# Patient Record
Sex: FEMALE | Race: WHITE | NOT HISPANIC OR LATINO | Employment: PART TIME | URBAN - METROPOLITAN AREA
[De-identification: names, ages, dates, MRNs, and addresses within clinical notes are randomized per-mention and may not be internally consistent; named-entity substitution may affect disease eponyms.]

---

## 2017-04-17 ENCOUNTER — HISTORICAL (OUTPATIENT)
Dept: ADMINISTRATIVE | Facility: HOSPITAL | Age: 67
End: 2017-04-17

## 2019-08-13 ENCOUNTER — HISTORICAL (OUTPATIENT)
Dept: ADMINISTRATIVE | Facility: HOSPITAL | Age: 69
End: 2019-08-13

## 2019-08-13 LAB
ALBUMIN SERPL-MCNC: 4.3 G/DL (ref 3.6–4.8)
ALBUMIN/GLOB SERPL: 1.5 {RATIO} (ref 1.2–2.2)
ALP SERPL-CCNC: 65 IU/L (ref 39–117)
ALT SERPL-CCNC: 31 IU/L (ref 0–32)
AST SERPL-CCNC: 25 IU/L (ref 0–40)
BASOPHILS # BLD AUTO: 0.1 X10E3/UL (ref 0–0.2)
BASOPHILS NFR BLD AUTO: 1 %
BILIRUB SERPL-MCNC: 0.4 MG/DL (ref 0–1.2)
BUN SERPL-MCNC: 14 MG/DL (ref 8–27)
CALCIUM SERPL-MCNC: 9.8 MG/DL (ref 8.7–10.3)
CHLORIDE SERPL-SCNC: 100 MMOL/L (ref 96–106)
CO2 SERPL-SCNC: 22 MMOL/L (ref 20–29)
CREAT SERPL-MCNC: 0.64 MG/DL (ref 0.57–1)
CREAT/UREA NIT SERPL: 22 (ref 12–28)
EOSINOPHIL # BLD AUTO: 0.2 X10E3/UL (ref 0–0.4)
EOSINOPHIL NFR BLD AUTO: 2 %
ERYTHROCYTE [DISTWIDTH] IN BLOOD BY AUTOMATED COUNT: 12.8 % (ref 12.3–15.4)
GLOBULIN SER-MCNC: 2.8 G/DL (ref 1.5–4.5)
GLUCOSE SERPL-MCNC: 100 MG/DL (ref 65–99)
HCT VFR BLD AUTO: 36.5 % (ref 34–46.6)
HGB BLD-MCNC: 11.5 G/DL (ref 11.1–15.9)
INFLUENZA A ANTIGEN, POC: NEGATIVE
INFLUENZA B ANTIGEN, POC: NEGATIVE
LYMPHOCYTES # BLD AUTO: 1.6 X10E3/UL (ref 0.7–3.1)
LYMPHOCYTES NFR BLD AUTO: 18 %
MCH RBC QN AUTO: 30.2 PG (ref 26.6–33)
MCHC RBC AUTO-ENTMCNC: 31.5 G/DL (ref 31.5–35.7)
MCV RBC AUTO: 96 FL (ref 79–97)
MONOCYTES # BLD AUTO: 0.8 X10E3/UL (ref 0.1–0.9)
MONOCYTES NFR BLD AUTO: 9 %
NEUTROPHILS # BLD AUTO: 6.2 X10E3/UL (ref 1.4–7)
NEUTROPHILS NFR BLD AUTO: 70 %
PLATELET # BLD AUTO: 344 X10E3/UL (ref 150–450)
POTASSIUM SERPL-SCNC: 4.3 MMOL/L (ref 3.5–5.2)
PROT SERPL-MCNC: 7.1 G/DL (ref 6–8.5)
RBC # BLD AUTO: 3.81 X10(6)/MCL (ref 3.77–5.28)
SODIUM SERPL-SCNC: 143 MMOL/L (ref 134–144)
WBC # SPEC AUTO: 8.8 X10E3/UL (ref 3.4–10.8)

## 2022-04-10 ENCOUNTER — HISTORICAL (OUTPATIENT)
Dept: ADMINISTRATIVE | Facility: HOSPITAL | Age: 72
End: 2022-04-10

## 2022-04-28 VITALS
WEIGHT: 144.06 LBS | BODY MASS INDEX: 24 KG/M2 | DIASTOLIC BLOOD PRESSURE: 84 MMHG | SYSTOLIC BLOOD PRESSURE: 126 MMHG | HEIGHT: 65 IN

## 2023-11-25 ENCOUNTER — HOSPITAL ENCOUNTER (INPATIENT)
Facility: HOSPITAL | Age: 73
LOS: 6 days | Discharge: HOME | DRG: 522 | End: 2023-12-01
Attending: STUDENT IN AN ORGANIZED HEALTH CARE EDUCATION/TRAINING PROGRAM | Admitting: INTERNAL MEDICINE
Payer: MEDICARE

## 2023-11-25 DIAGNOSIS — S72.009A FEMORAL NECK FRACTURE: ICD-10-CM

## 2023-11-25 DIAGNOSIS — S72.012A CLOSED SUBCAPITAL FRACTURE OF LEFT FEMUR, INITIAL ENCOUNTER: ICD-10-CM

## 2023-11-25 DIAGNOSIS — R07.9 CHEST PAIN: ICD-10-CM
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
## 2023-11-25 DIAGNOSIS — Z01.818 PRE-OP EVALUATION: ICD-10-CM

## 2023-11-25 DIAGNOSIS — I48.91 ATRIAL FIBRILLATION: ICD-10-CM

## 2023-11-25 DIAGNOSIS — S72.002A CLOSED DISPLACED FRACTURE OF NECK OF LEFT FEMUR: Primary | ICD-10-CM

## 2023-11-25 LAB
ABORH RETYPE: NORMAL
ALBUMIN SERPL-MCNC: 4.1 G/DL (ref 3.4–4.8)
ALBUMIN/GLOB SERPL: 1.8 RATIO (ref 1.1–2)
ALP SERPL-CCNC: 46 UNIT/L (ref 40–150)
ALT SERPL-CCNC: 15 UNIT/L (ref 0–55)
AST SERPL-CCNC: 19 UNIT/L (ref 5–34)
BASOPHILS # BLD AUTO: 0.04 X10(3)/MCL
BASOPHILS NFR BLD AUTO: 0.5 %
BILIRUB SERPL-MCNC: 0.9 MG/DL
BUN SERPL-MCNC: 15.9 MG/DL (ref 9.8–20.1)
CALCIUM SERPL-MCNC: 9.2 MG/DL (ref 8.4–10.2)
CHLORIDE SERPL-SCNC: 107 MMOL/L (ref 98–107)
CO2 SERPL-SCNC: 24 MMOL/L (ref 23–31)
CREAT SERPL-MCNC: 0.7 MG/DL (ref 0.55–1.02)
EOSINOPHIL # BLD AUTO: 0.21 X10(3)/MCL (ref 0–0.9)
EOSINOPHIL NFR BLD AUTO: 2.5 %
ERYTHROCYTE [DISTWIDTH] IN BLOOD BY AUTOMATED COUNT: 12.7 % (ref 11.5–17)
GFR SERPLBLD CREATININE-BSD FMLA CKD-EPI: >60 MLS/MIN/1.73/M2
GLOBULIN SER-MCNC: 2.3 GM/DL (ref 2.4–3.5)
GLUCOSE SERPL-MCNC: 99 MG/DL (ref 82–115)
GROUP & RH: NORMAL
HCT VFR BLD AUTO: 35 % (ref 37–47)
HGB BLD-MCNC: 12 G/DL (ref 12–16)
IMM GRANULOCYTES # BLD AUTO: 0.05 X10(3)/MCL (ref 0–0.04)
IMM GRANULOCYTES NFR BLD AUTO: 0.6 %
INDIRECT COOMBS GEL: NORMAL
LYMPHOCYTES # BLD AUTO: 1.35 X10(3)/MCL (ref 0.6–4.6)
LYMPHOCYTES NFR BLD AUTO: 16.2 %
MCH RBC QN AUTO: 30.4 PG (ref 27–31)
MCHC RBC AUTO-ENTMCNC: 34.3 G/DL (ref 33–36)
MCV RBC AUTO: 88.6 FL (ref 80–94)
MONOCYTES # BLD AUTO: 0.47 X10(3)/MCL (ref 0.1–1.3)
MONOCYTES NFR BLD AUTO: 5.6 %
NEUTROPHILS # BLD AUTO: 6.23 X10(3)/MCL (ref 2.1–9.2)
NEUTROPHILS NFR BLD AUTO: 74.6 %
NRBC BLD AUTO-RTO: 0 %
PLATELET # BLD AUTO: 217 X10(3)/MCL (ref 130–400)
PMV BLD AUTO: 10.2 FL (ref 7.4–10.4)
POTASSIUM SERPL-SCNC: 4.1 MMOL/L (ref 3.5–5.1)
PROT SERPL-MCNC: 6.4 GM/DL (ref 5.8–7.6)
RBC # BLD AUTO: 3.95 X10(6)/MCL (ref 4.2–5.4)
SODIUM SERPL-SCNC: 142 MMOL/L (ref 136–145)
SPECIMEN OUTDATE: NORMAL
WBC # SPEC AUTO: 8.35 X10(3)/MCL (ref 4.5–11.5)

## 2023-11-25 PROCEDURE — 85025 COMPLETE CBC W/AUTO DIFF WBC: CPT | Performed by: STUDENT IN AN ORGANIZED HEALTH CARE EDUCATION/TRAINING PROGRAM

## 2023-11-25 PROCEDURE — 63600175 PHARM REV CODE 636 W HCPCS: Performed by: NURSE PRACTITIONER

## 2023-11-25 PROCEDURE — 96375 TX/PRO/DX INJ NEW DRUG ADDON: CPT

## 2023-11-25 PROCEDURE — 96374 THER/PROPH/DIAG INJ IV PUSH: CPT

## 2023-11-25 PROCEDURE — 11000001 HC ACUTE MED/SURG PRIVATE ROOM

## 2023-11-25 PROCEDURE — 96376 TX/PRO/DX INJ SAME DRUG ADON: CPT

## 2023-11-25 PROCEDURE — 80053 COMPREHEN METABOLIC PANEL: CPT | Performed by: STUDENT IN AN ORGANIZED HEALTH CARE EDUCATION/TRAINING PROGRAM

## 2023-11-25 PROCEDURE — 63600175 PHARM REV CODE 636 W HCPCS: Performed by: STUDENT IN AN ORGANIZED HEALTH CARE EDUCATION/TRAINING PROGRAM

## 2023-11-25 PROCEDURE — 99285 EMERGENCY DEPT VISIT HI MDM: CPT | Mod: 25

## 2023-11-25 PROCEDURE — 63600175 PHARM REV CODE 636 W HCPCS: Performed by: INTERNAL MEDICINE

## 2023-11-25 PROCEDURE — 86850 RBC ANTIBODY SCREEN: CPT | Performed by: INTERNAL MEDICINE

## 2023-11-25 PROCEDURE — 96361 HYDRATE IV INFUSION ADD-ON: CPT

## 2023-11-25 PROCEDURE — 25000003 PHARM REV CODE 250: Performed by: INTERNAL MEDICINE

## 2023-11-25 RX ORDER — NALOXONE HCL 0.4 MG/ML
0.02 VIAL (ML) INJECTION
Status: DISCONTINUED | OUTPATIENT
Start: 2023-11-25 | End: 2023-11-30

## 2023-11-25 RX ORDER — HYDROMORPHONE HYDROCHLORIDE 2 MG/ML
1 INJECTION, SOLUTION INTRAMUSCULAR; INTRAVENOUS; SUBCUTANEOUS
Status: COMPLETED | OUTPATIENT
Start: 2023-11-25 | End: 2023-11-25

## 2023-11-25 RX ORDER — METHOCARBAMOL 100 MG/ML
1000 INJECTION, SOLUTION INTRAMUSCULAR; INTRAVENOUS ONCE
Status: COMPLETED | OUTPATIENT
Start: 2023-11-25 | End: 2023-11-25

## 2023-11-25 RX ORDER — ONDANSETRON 2 MG/ML
4 INJECTION INTRAMUSCULAR; INTRAVENOUS EVERY 4 HOURS PRN
Status: DISCONTINUED | OUTPATIENT
Start: 2023-11-25 | End: 2023-11-30

## 2023-11-25 RX ORDER — OXYCODONE AND ACETAMINOPHEN 5; 325 MG/1; MG/1
1 TABLET ORAL EVERY 6 HOURS PRN
Status: DISCONTINUED | OUTPATIENT
Start: 2023-11-25 | End: 2023-11-25

## 2023-11-25 RX ORDER — SERTRALINE HYDROCHLORIDE 50 MG/1
50 TABLET, FILM COATED ORAL
COMMUNITY

## 2023-11-25 RX ORDER — MAG HYDROX/ALUMINUM HYD/SIMETH 200-200-20
30 SUSPENSION, ORAL (FINAL DOSE FORM) ORAL 4 TIMES DAILY PRN
Status: DISCONTINUED | OUTPATIENT
Start: 2023-11-25 | End: 2023-12-01 | Stop reason: HOSPADM

## 2023-11-25 RX ORDER — ONDANSETRON 2 MG/ML
4 INJECTION INTRAMUSCULAR; INTRAVENOUS
Status: COMPLETED | OUTPATIENT
Start: 2023-11-25 | End: 2023-11-25

## 2023-11-25 RX ORDER — SOTALOL HYDROCHLORIDE 120 MG/1
120 TABLET ORAL 2 TIMES DAILY
Status: DISCONTINUED | OUTPATIENT
Start: 2023-11-25 | End: 2023-11-29

## 2023-11-25 RX ORDER — MORPHINE SULFATE 4 MG/ML
4 INJECTION, SOLUTION INTRAMUSCULAR; INTRAVENOUS EVERY 4 HOURS PRN
Status: DISCONTINUED | OUTPATIENT
Start: 2023-11-25 | End: 2023-11-26

## 2023-11-25 RX ORDER — PANTOPRAZOLE SODIUM 40 MG/1
1 TABLET, DELAYED RELEASE ORAL EVERY MORNING
COMMUNITY
Start: 2023-11-15

## 2023-11-25 RX ORDER — POLYETHYLENE GLYCOL 3350 17 G/17G
17 POWDER, FOR SOLUTION ORAL 2 TIMES DAILY PRN
Status: DISCONTINUED | OUTPATIENT
Start: 2023-11-25 | End: 2023-11-30

## 2023-11-25 RX ORDER — ATORVASTATIN CALCIUM 10 MG/1
20 TABLET, FILM COATED ORAL NIGHTLY
Status: DISCONTINUED | OUTPATIENT
Start: 2023-11-25 | End: 2023-12-01 | Stop reason: HOSPADM

## 2023-11-25 RX ORDER — APIXABAN 5 MG/1
5 TABLET, FILM COATED ORAL 2 TIMES DAILY
COMMUNITY

## 2023-11-25 RX ORDER — SODIUM CHLORIDE 0.9 % (FLUSH) 0.9 %
10 SYRINGE (ML) INJECTION
Status: DISCONTINUED | OUTPATIENT
Start: 2023-11-25 | End: 2023-12-01 | Stop reason: HOSPADM

## 2023-11-25 RX ORDER — PANTOPRAZOLE SODIUM 40 MG/1
40 TABLET, DELAYED RELEASE ORAL EVERY MORNING
Status: DISCONTINUED | OUTPATIENT
Start: 2023-11-26 | End: 2023-12-01 | Stop reason: HOSPADM

## 2023-11-25 RX ORDER — ACETAMINOPHEN 325 MG/1
650 TABLET ORAL EVERY 6 HOURS PRN
Status: DISCONTINUED | OUTPATIENT
Start: 2023-11-25 | End: 2023-11-30

## 2023-11-25 RX ORDER — MELOXICAM 15 MG/1
15 TABLET ORAL
COMMUNITY

## 2023-11-25 RX ORDER — METHOCARBAMOL 100 MG/ML
500 INJECTION, SOLUTION INTRAMUSCULAR; INTRAVENOUS EVERY 12 HOURS PRN
Status: DISPENSED | OUTPATIENT
Start: 2023-11-25 | End: 2023-11-26

## 2023-11-25 RX ORDER — TALC
6 POWDER (GRAM) TOPICAL NIGHTLY PRN
Status: DISCONTINUED | OUTPATIENT
Start: 2023-11-25 | End: 2023-11-27

## 2023-11-25 RX ORDER — PROCHLORPERAZINE EDISYLATE 5 MG/ML
5 INJECTION INTRAMUSCULAR; INTRAVENOUS EVERY 6 HOURS PRN
Status: DISCONTINUED | OUTPATIENT
Start: 2023-11-25 | End: 2023-11-30

## 2023-11-25 RX ORDER — SOTALOL HYDROCHLORIDE 120 MG/1
120 TABLET ORAL 2 TIMES DAILY
Status: ON HOLD | COMMUNITY
End: 2023-12-01 | Stop reason: HOSPADM

## 2023-11-25 RX ORDER — LABETALOL HYDROCHLORIDE 5 MG/ML
10 INJECTION, SOLUTION INTRAVENOUS
Status: DISCONTINUED | OUTPATIENT
Start: 2023-11-26 | End: 2023-11-30

## 2023-11-25 RX ORDER — SODIUM CHLORIDE, SODIUM LACTATE, POTASSIUM CHLORIDE, CALCIUM CHLORIDE 600; 310; 30; 20 MG/100ML; MG/100ML; MG/100ML; MG/100ML
INJECTION, SOLUTION INTRAVENOUS CONTINUOUS
Status: DISCONTINUED | OUTPATIENT
Start: 2023-11-25 | End: 2023-11-28

## 2023-11-25 RX ORDER — SERTRALINE HYDROCHLORIDE 50 MG/1
50 TABLET, FILM COATED ORAL DAILY
Status: DISCONTINUED | OUTPATIENT
Start: 2023-11-26 | End: 2023-12-01 | Stop reason: HOSPADM

## 2023-11-25 RX ORDER — HYDRALAZINE HYDROCHLORIDE 20 MG/ML
10 INJECTION INTRAMUSCULAR; INTRAVENOUS EVERY 4 HOURS PRN
Status: DISCONTINUED | OUTPATIENT
Start: 2023-11-26 | End: 2023-11-30

## 2023-11-25 RX ORDER — ROSUVASTATIN CALCIUM 20 MG/1
20 TABLET, COATED ORAL NIGHTLY
COMMUNITY

## 2023-11-25 RX ORDER — OXYCODONE AND ACETAMINOPHEN 10; 325 MG/1; MG/1
1 TABLET ORAL EVERY 4 HOURS PRN
Status: DISCONTINUED | OUTPATIENT
Start: 2023-11-25 | End: 2023-11-30

## 2023-11-25 RX ORDER — SIMETHICONE 80 MG
1 TABLET,CHEWABLE ORAL 4 TIMES DAILY PRN
Status: DISCONTINUED | OUTPATIENT
Start: 2023-11-25 | End: 2023-12-01 | Stop reason: HOSPADM

## 2023-11-25 RX ORDER — MORPHINE SULFATE 4 MG/ML
4 INJECTION, SOLUTION INTRAMUSCULAR; INTRAVENOUS
Status: COMPLETED | OUTPATIENT
Start: 2023-11-25 | End: 2023-11-25

## 2023-11-25 RX ADMIN — MORPHINE SULFATE 4 MG: 4 INJECTION, SOLUTION INTRAMUSCULAR; INTRAVENOUS at 03:11

## 2023-11-25 RX ADMIN — MORPHINE SULFATE 4 MG: 4 INJECTION, SOLUTION INTRAMUSCULAR; INTRAVENOUS at 10:11

## 2023-11-25 RX ADMIN — SODIUM CHLORIDE, POTASSIUM CHLORIDE, SODIUM LACTATE AND CALCIUM CHLORIDE: 600; 310; 30; 20 INJECTION, SOLUTION INTRAVENOUS at 09:11

## 2023-11-25 RX ADMIN — HYDROMORPHONE HYDROCHLORIDE 1 MG: 2 INJECTION INTRAMUSCULAR; INTRAVENOUS; SUBCUTANEOUS at 05:11

## 2023-11-25 RX ADMIN — SOTALOL HYDROCHLORIDE 120 MG: 120 TABLET ORAL at 11:11

## 2023-11-25 RX ADMIN — ONDANSETRON 4 MG: 2 INJECTION INTRAMUSCULAR; INTRAVENOUS at 03:11

## 2023-11-25 RX ADMIN — OXYCODONE HYDROCHLORIDE AND ACETAMINOPHEN 1 TABLET: 5; 325 TABLET ORAL at 08:11

## 2023-11-25 RX ADMIN — METHOCARBAMOL 500 MG: 100 INJECTION INTRAMUSCULAR; INTRAVENOUS at 10:11

## 2023-11-25 RX ADMIN — ATORVASTATIN CALCIUM 20 MG: 10 TABLET, FILM COATED ORAL at 11:11

## 2023-11-25 RX ADMIN — SODIUM CHLORIDE, POTASSIUM CHLORIDE, SODIUM LACTATE AND CALCIUM CHLORIDE 1000 ML: 600; 310; 30; 20 INJECTION, SOLUTION INTRAVENOUS at 03:11

## 2023-11-25 RX ADMIN — METHOCARBAMOL 1000 MG: 100 INJECTION INTRAMUSCULAR; INTRAVENOUS at 05:11

## 2023-11-25 NOTE — ED PROVIDER NOTES
Encounter Date: 11/25/2023    SCRIBE #1 NOTE: I, Brian Gimenez, am scribing for, and in the presence of,  Last Triana MD. I have scribed the following portions of the note - Other sections scribed: HPI, ROS, PE, MDM.       History     Chief Complaint   Patient presents with    Fall     Pt. Reports a trip and fall onto L hip, denies striking head/LOC, GCS 15. PMS in tact. Pt. Given 100mcg Fentanyl in route.      Patient is a 72 y/o female with a history of atrial fibrillation on Eliquis and hyperlipidemia presents to [facility] ED via EMS after sustaining a ground level trip and fall earlier today. Patient states that she is painting cabinets at home and that she tripped over the Visqueen earlier today causing her to fall. Patient has complaint of left hip pain. Patient notes that she has not been able to ambulate since the fall. Patient denies hitting her head, neck pain, and a loss of consciousness. EMS reports that the patient received 100mcg of fentanyl in route to the ED.     The history is provided by the patient, the EMS personnel and medical records.     Review of patient's allergies indicates:   Allergen Reactions    Tomato     Weed pollen      History reviewed. No pertinent past medical history.  History reviewed. No pertinent surgical history.  History reviewed. No pertinent family history.     Review of Systems   Constitutional:  Negative for fever.   HENT:  Negative for sore throat.    Eyes:  Negative for visual disturbance.   Respiratory:  Negative for shortness of breath.    Cardiovascular:  Negative for chest pain.   Gastrointestinal:  Negative for abdominal pain.   Genitourinary:  Negative for dysuria.   Musculoskeletal:  Negative for joint swelling.        Left hip pain   Skin:  Negative for rash.   Neurological:  Negative for weakness.   Psychiatric/Behavioral:  Negative for confusion.        Physical Exam     Initial Vitals [11/25/23 1505]   BP Pulse Resp Temp SpO2   (!) 152/94 75 20 97.5 °F  (36.4 °C) 98 %      MAP       --         Physical Exam    Nursing note and vitals reviewed.  Constitutional: She appears well-developed and well-nourished. She is not diaphoretic. No distress.   HENT:   Head: Normocephalic and atraumatic.   No abrasions, contusions, lacerations to the scalp or face.  No superior inferior orbital ridge tenderness to palpation.  No zygomatic arch tenderness to palpation.  No epistaxis.  No CSF rhinorrhea.  No septal hematoma.  No intraoral injuries noted.  Normal external ear.  No raccoon eyes.  No Rawls sign.     Eyes: Conjunctivae and EOM are normal. Pupils are equal, round, and reactive to light.   Neck:   Normal range of motion.  Cardiovascular:  Normal rate, regular rhythm, normal heart sounds and intact distal pulses.           No murmur heard.  Pulmonary/Chest: Breath sounds normal. No respiratory distress. She has no wheezes. She has no rales.   Abdominal: Abdomen is soft. She exhibits no distension. There is no abdominal tenderness.   Musculoskeletal:         General: Tenderness present. No edema.      Cervical back: Normal range of motion.      Left hip: Tenderness present.      Comments: Left hip tenderness to palpation.  Decreased range of motion of left hip.  Full range of motion of left knee and left ankle.     Neurological: She is alert and oriented to person, place, and time. No cranial nerve deficit.   Skin: Skin is warm. No rash noted. No erythema.         ED Course   Procedures  Labs Reviewed   COMPREHENSIVE METABOLIC PANEL - Abnormal; Notable for the following components:       Result Value    Globulin 2.3 (*)     All other components within normal limits   CBC WITH DIFFERENTIAL - Abnormal; Notable for the following components:    RBC 3.95 (*)     Hct 35.0 (*)     IG# 0.05 (*)     All other components within normal limits   CBC W/ AUTO DIFFERENTIAL    Narrative:     The following orders were created for panel order CBC auto differential.  Procedure                                Abnormality         Status                     ---------                               -----------         ------                     CBC with Differential[[id number]]       Abnormal            Final result                 Please view results for these tests on the individual orders.          Imaging Results              CT Head Without Contrast (Preliminary result)  Result time 11/25/23 17:26:58      Preliminary result by Brigido West Jr., MD (11/25/23 17:26:58)                   Narrative:    START OF REPORT:  Technique: CT of the head was performed without intravenous contrast with axial as well as coronal and sagittal images.    Comparison: None.    Dosage Information: Automated Exposure Control was utilized 924.29 mGy.cm.    Clinical history: Fall on thinners.    Findings:  Hemorrhage: No acute intracranial hemorrhage is seen.  CSF spaces: The ventricles, sulci and basal cisterns all appear mildly prominent consistent with global cerebral atrophy.  Brain parenchyma: There is preservation of the grey white junction throughout. No acute infarct. Microvascular change is seen in portions of the periventricular and deep white matter tracts.  Cerebellum: Unremarkable.  Vascular: Mild atheromatous calcification of the intracranial arteries is seen.  Sella and skull base: The sella appears to be within normal limits for age.  Intracranial calcifications: Incidental note is made of bilateral choroid plexus calcification. Incidental note is made of some pineal region calcification. Incidental note is made of some calcification of the falx. Incidental note is made of some cerebellar calcification.  Calvarium: No acute linear or depressed skull fracture is seen.    Maxillofacial Structures:  Paranasal sinuses: The visualized paranasal sinuses appear clear with no mucoperiosteal thickening or air fluid levels identified.  Orbits: The orbits appear unremarkable.  Zygomatic arches: The zygomatic arches  are intact and unremarkable.  Temporal bones and mastoids: The temporal bones and mastoids appear unremarkable.  TMJ: The mandibular condyles appear normally placed with respect to the mandibular fossa.  Nasal Bones: No displaced nasal bone fracture is seen.    Visualized upper cervical spine: The visualized cervical spine appears unremarkable.      Impression:  1. No acute intracranial process identified. Details and other findings as noted above.                                         X-Ray Chest AP Portable (Final result)  Result time 11/25/23 16:14:17      Final result by Julienne Wright MD (11/25/23 16:14:17)                   Impression:      No acute cardiopulmonary abnormality.      Electronically signed by: Julienne Wright  Date:    11/25/2023  Time:    16:14               Narrative:    EXAMINATION:  XR CHEST AP PORTABLE    CLINICAL HISTORY:  Encounter for other preprocedural examination    TECHNIQUE:  Single frontal view of the chest was performed.    COMPARISON:  12/27/2016    FINDINGS:  LINES AND TUBES: EKG/telemetry leads overlie the chest.  Bra straps minimally obscure evaluation.    MEDIASTINUM AND JUAN: The cardiac silhouette is normal. Aortic atherosclerosis.    LUNGS: No lobar consolidation. No edema.    PLEURA:No pleural effusion. No pneumothorax.    BONES: No acute osseous abnormality.                                       X-Ray Hip 2 or 3 views Left (with Pelvis when performed) (Final result)  Result time 11/25/23 15:54:26      Final result by Julienne Wright MD (11/25/23 15:54:26)                   Impression:      Subcapital left femoral neck fracture.      Electronically signed by: Julienne Wright  Date:    11/25/2023  Time:    15:54               Narrative:    EXAMINATION:  XR HIP WITH PELVIS WHEN PERFORMED, 2 OR 3 VIEWS LEFT    CLINICAL HISTORY:  hip pain;    TECHNIQUE:  AP view of the pelvis and AP and frog leg lateral view of the left hip were  performed.    COMPARISON:  None.    FINDINGS:  BONE: Subcapital left femoral neck fracture.  No dislocation.  Patient is rotated to the left.    SOFT TISSUES: Unremarkable.                                    X-Rays:   Independently Interpreted Readings:   Chest X-Ray: No infiltrates.  No acute abnormalities.     Medications   morphine injection 4 mg (4 mg Intravenous Given 11/25/23 1544)   ondansetron injection 4 mg (4 mg Intravenous Given 11/25/23 1544)   lactated ringers bolus 1,000 mL (0 mLs Intravenous Stopped 11/25/23 1644)   HYDROmorphone (PF) injection 1 mg (1 mg Intravenous Given 11/25/23 1708)   methocarbamoL injection 1,000 mg (1,000 mg Intravenous Given 11/25/23 1757)     Medical Decision Making  Problems Addressed:  Closed subcapital fracture of left femur, initial encounter: acute illness or injury that poses a threat to life or bodily functions  Fall, initial encounter: acute illness or injury that poses a threat to life or bodily functions  Femoral neck fracture: acute illness or injury that poses a threat to life or bodily functions  Pre-op evaluation: acute illness or injury that poses a threat to life or bodily functions    Amount and/or Complexity of Data Reviewed  Labs: ordered.  Radiology: ordered and independent interpretation performed.    Risk  Prescription drug management.  Decision regarding hospitalization.            Scribe Attestation:   Scribe #1: I performed the above scribed service and the documentation accurately describes the services I performed. I attest to the accuracy of the note.    Attending Attestation:           Physician Attestation for Scribe:  Physician Attestation Statement for Scribe #1: I, Last Triana MD, reviewed documentation, as scribed by Brian Gimenez in my presence, and it is both accurate and complete.                        Medical Decision Making:   History:   I obtained history from: someone other than patient and EMS provider.       <> Summary of  History: Collateral from paramedics.  Received fentanyl EN route  Old Medical Records: I decided to obtain old medical records.  Old Records Summarized: records from clinic visits, records from previous admission(s) and records from another hospital.       <> Summary of Records: Reviewed old record, hx of afib on eliquis  Initial Assessment:   Fall,   Differential Diagnosis:   Judging by the patient's chief complaint and pertinent history, the patient has the following possible differential diagnoses, including but not limited to the following.  Some of these are deemed to be lower likelihood and some more likely based on my physical exam and history combined with possible lab work and/or imaging studies.   Please see the pertinent studies, and refer to the HPI.  Some of these diagnoses will take further evaluation to fully rule out, perhaps as an outpatient and the patient was encouraged to follow up when discharged for more comprehensive evaluation.      Hip fracture, contusion, hip dislocation  Independently Interpreted Test(s):   I have ordered and independently interpreted X-rays - see prior notes.  I have ordered and independently interpreted EKG Reading(s) - see prior notes  Clinical Tests:   Lab Tests: Reviewed and Ordered  Radiological Study: Ordered and Reviewed  Medical Tests: Ordered and Reviewed  ED Management:  Patient is a 73-year-old female presents to emergency department for left hip pain.  See HPI.  See physical exam.  Subcapital femoral neck fracture noted.  Given pain and nausea medications.  Discussed with Orthopedic surgery.  Will admit.  Surgery tomorrow.  Discussed with hospital medicine who request CT of the head.  CT of the head performed.  No evidence of bleed.  All results discussed with the patient and family.  Answered all questions time.  Patient and family verbalized understanding agreed to plan.  Hemodynamically stable at this time.  Other:   I have discussed this case with another  health care provider.       <> Summary of the Discussion: Discussed case with Orthopedic surgery.      Discussed case with hospital medicine who will admit the patient.          Clinical Impression:  Final diagnoses:  [Z01.818] Pre-op evaluation  [S72.009A] Femoral neck fracture  [S72.012A] Closed subcapital fracture of left femur, initial encounter  [W19.XXXA] Fall, initial encounter          ED Disposition Condition    Admit Stable                Last Triana MD  11/25/23 9249

## 2023-11-25 NOTE — Clinical Note
Diagnosis: Femoral neck fracture [195717]   Future Attending Provider: RELL JACKSON [[id number]]   Admitting Provider:: RELL JACKSON [[id number]]   Admit to which facility:: [facility] [[id number]]   Reason for IP Medical Treatment  (Clinical interventions that can only be accomplished in the IP setting? ) :: Femoral neck fx   I certify that Inpatient services for greater than or equal to 2 midnights are medically necessary:: Yes   Plans for Post-Acute care--if anticipated (pick the single best option):: A. No post acute care anticipated at this time

## 2023-11-26 LAB
ALBUMIN SERPL-MCNC: 3.7 G/DL (ref 3.4–4.8)
ALBUMIN/GLOB SERPL: 1.5 RATIO (ref 1.1–2)
ALP SERPL-CCNC: 39 UNIT/L (ref 40–150)
ALT SERPL-CCNC: 14 UNIT/L (ref 0–55)
APTT PPP: 24.3 SECONDS (ref 23.2–33.7)
AST SERPL-CCNC: 18 UNIT/L (ref 5–34)
BASOPHILS # BLD AUTO: 0.03 X10(3)/MCL
BASOPHILS NFR BLD AUTO: 0.3 %
BILIRUB SERPL-MCNC: 0.9 MG/DL
BUN SERPL-MCNC: 13.1 MG/DL (ref 9.8–20.1)
CALCIUM SERPL-MCNC: 9 MG/DL (ref 8.4–10.2)
CHLORIDE SERPL-SCNC: 109 MMOL/L (ref 98–107)
CO2 SERPL-SCNC: 23 MMOL/L (ref 23–31)
CREAT SERPL-MCNC: 0.72 MG/DL (ref 0.55–1.02)
EOSINOPHIL # BLD AUTO: 0.02 X10(3)/MCL (ref 0–0.9)
EOSINOPHIL NFR BLD AUTO: 0.2 %
ERYTHROCYTE [DISTWIDTH] IN BLOOD BY AUTOMATED COUNT: 12.7 % (ref 11.5–17)
GFR SERPLBLD CREATININE-BSD FMLA CKD-EPI: >60 MLS/MIN/1.73/M2
GLOBULIN SER-MCNC: 2.4 GM/DL (ref 2.4–3.5)
GLUCOSE SERPL-MCNC: 134 MG/DL (ref 82–115)
HCT VFR BLD AUTO: 34 % (ref 37–47)
HGB BLD-MCNC: 11.3 G/DL (ref 12–16)
IMM GRANULOCYTES # BLD AUTO: 0.03 X10(3)/MCL (ref 0–0.04)
IMM GRANULOCYTES NFR BLD AUTO: 0.3 %
INR PPP: 1.1
LYMPHOCYTES # BLD AUTO: 1.38 X10(3)/MCL (ref 0.6–4.6)
LYMPHOCYTES NFR BLD AUTO: 13.9 %
MAGNESIUM SERPL-MCNC: 1.8 MG/DL (ref 1.6–2.6)
MCH RBC QN AUTO: 30.1 PG (ref 27–31)
MCHC RBC AUTO-ENTMCNC: 33.2 G/DL (ref 33–36)
MCV RBC AUTO: 90.4 FL (ref 80–94)
MONOCYTES # BLD AUTO: 0.54 X10(3)/MCL (ref 0.1–1.3)
MONOCYTES NFR BLD AUTO: 5.4 %
NEUTROPHILS # BLD AUTO: 7.93 X10(3)/MCL (ref 2.1–9.2)
NEUTROPHILS NFR BLD AUTO: 79.9 %
NRBC BLD AUTO-RTO: 0 %
PHOSPHATE SERPL-MCNC: 3.5 MG/DL (ref 2.3–4.7)
PLATELET # BLD AUTO: 198 X10(3)/MCL (ref 130–400)
PMV BLD AUTO: 9.9 FL (ref 7.4–10.4)
POTASSIUM SERPL-SCNC: 4.1 MMOL/L (ref 3.5–5.1)
PROT SERPL-MCNC: 6.1 GM/DL (ref 5.8–7.6)
PROTHROMBIN TIME: 13.6 SECONDS (ref 12.5–14.5)
RBC # BLD AUTO: 3.76 X10(6)/MCL (ref 4.2–5.4)
SODIUM SERPL-SCNC: 140 MMOL/L (ref 136–145)
WBC # SPEC AUTO: 9.93 X10(3)/MCL (ref 4.5–11.5)

## 2023-11-26 PROCEDURE — 25000003 PHARM REV CODE 250: Performed by: NURSE PRACTITIONER

## 2023-11-26 PROCEDURE — 11000001 HC ACUTE MED/SURG PRIVATE ROOM

## 2023-11-26 PROCEDURE — 96375 TX/PRO/DX INJ NEW DRUG ADDON: CPT

## 2023-11-26 PROCEDURE — 85730 THROMBOPLASTIN TIME PARTIAL: CPT | Performed by: NURSE PRACTITIONER

## 2023-11-26 PROCEDURE — 96376 TX/PRO/DX INJ SAME DRUG ADON: CPT

## 2023-11-26 PROCEDURE — 85025 COMPLETE CBC W/AUTO DIFF WBC: CPT | Performed by: NURSE PRACTITIONER

## 2023-11-26 PROCEDURE — 93010 EKG 12-LEAD: ICD-10-PCS | Mod: ,,, | Performed by: INTERNAL MEDICINE

## 2023-11-26 PROCEDURE — 84100 ASSAY OF PHOSPHORUS: CPT | Performed by: NURSE PRACTITIONER

## 2023-11-26 PROCEDURE — 27000221 HC OXYGEN, UP TO 24 HOURS

## 2023-11-26 PROCEDURE — 63600175 PHARM REV CODE 636 W HCPCS: Performed by: INTERNAL MEDICINE

## 2023-11-26 PROCEDURE — 63600175 PHARM REV CODE 636 W HCPCS: Performed by: NURSE PRACTITIONER

## 2023-11-26 PROCEDURE — 25000003 PHARM REV CODE 250: Performed by: INTERNAL MEDICINE

## 2023-11-26 PROCEDURE — 93005 ELECTROCARDIOGRAM TRACING: CPT

## 2023-11-26 PROCEDURE — 80053 COMPREHEN METABOLIC PANEL: CPT | Performed by: NURSE PRACTITIONER

## 2023-11-26 PROCEDURE — 83735 ASSAY OF MAGNESIUM: CPT | Performed by: NURSE PRACTITIONER

## 2023-11-26 PROCEDURE — 85610 PROTHROMBIN TIME: CPT | Performed by: NURSE PRACTITIONER

## 2023-11-26 PROCEDURE — 21400001 HC TELEMETRY ROOM

## 2023-11-26 PROCEDURE — 93010 ELECTROCARDIOGRAM REPORT: CPT | Mod: ,,, | Performed by: INTERNAL MEDICINE

## 2023-11-26 RX ORDER — HYDROMORPHONE HYDROCHLORIDE 2 MG/ML
2 INJECTION, SOLUTION INTRAMUSCULAR; INTRAVENOUS; SUBCUTANEOUS EVERY 4 HOURS PRN
Status: DISCONTINUED | OUTPATIENT
Start: 2023-11-26 | End: 2023-11-26

## 2023-11-26 RX ORDER — HYDROMORPHONE HYDROCHLORIDE 2 MG/1
2 TABLET ORAL EVERY 4 HOURS PRN
Status: CANCELLED | OUTPATIENT
Start: 2023-11-26

## 2023-11-26 RX ORDER — HYDROMORPHONE HYDROCHLORIDE 2 MG/ML
1 INJECTION, SOLUTION INTRAMUSCULAR; INTRAVENOUS; SUBCUTANEOUS EVERY 4 HOURS PRN
Status: DISCONTINUED | OUTPATIENT
Start: 2023-11-26 | End: 2023-11-30

## 2023-11-26 RX ADMIN — MORPHINE SULFATE 4 MG: 4 INJECTION, SOLUTION INTRAMUSCULAR; INTRAVENOUS at 06:11

## 2023-11-26 RX ADMIN — SOTALOL HYDROCHLORIDE 120 MG: 120 TABLET ORAL at 09:11

## 2023-11-26 RX ADMIN — HYDROMORPHONE HYDROCHLORIDE 2 MG: 2 INJECTION INTRAMUSCULAR; INTRAVENOUS; SUBCUTANEOUS at 10:11

## 2023-11-26 RX ADMIN — SODIUM CHLORIDE, POTASSIUM CHLORIDE, SODIUM LACTATE AND CALCIUM CHLORIDE: 600; 310; 30; 20 INJECTION, SOLUTION INTRAVENOUS at 02:11

## 2023-11-26 RX ADMIN — ATORVASTATIN CALCIUM 20 MG: 10 TABLET, FILM COATED ORAL at 09:11

## 2023-11-26 RX ADMIN — METHOCARBAMOL 500 MG: 100 INJECTION INTRAMUSCULAR; INTRAVENOUS at 03:11

## 2023-11-26 RX ADMIN — OXYCODONE HYDROCHLORIDE AND ACETAMINOPHEN 0.5 TABLET: 10; 325 TABLET ORAL at 09:11

## 2023-11-26 NOTE — NURSING
Nurses Note -- 4 Eyes      11/26/2023   3:54 PM      Skin assessed during: Admit      [x] No Altered Skin Integrity Present    []Prevention Measures Documented      [] Yes- Altered Skin Integrity Present or Discovered   [] LDA Added if Not in Epic (Describe Wound)   [] New Altered Skin Integrity was Present on Admit and Documented in LDA   [] Wound Image Taken    Wound Care Consulted? No    Attending Nurse:  Jaki Lemons RN/Staff Member:  Katerine

## 2023-11-26 NOTE — CONSULTS
History reviewed. No pertinent past medical history.    History reviewed. No pertinent surgical history.    Current Facility-Administered Medications   Medication    acetaminophen tablet 650 mg    aluminum-magnesium hydroxide-simethicone 200-200-20 mg/5 mL suspension 30 mL    atorvastatin tablet 20 mg    hydrALAZINE injection 10 mg    HYDROmorphone (PF) injection 2 mg    labetaloL injection 10 mg    lactated ringers infusion    melatonin tablet 6 mg    methocarbamoL injection 500 mg    naloxone 0.4 mg/mL injection 0.02 mg    ondansetron injection 4 mg    oxyCODONE-acetaminophen  mg per tablet 1 tablet    pantoprazole EC tablet 40 mg    polyethylene glycol packet 17 g    prochlorperazine injection Soln 5 mg    sertraline tablet 50 mg    simethicone chewable tablet 80 mg    sodium chloride 0.9% flush 10 mL    sotaloL tablet 120 mg     Current Outpatient Medications   Medication Sig    pantoprazole (PROTONIX) 40 MG tablet Take 1 tablet by mouth every morning.    ELIQUIS 5 mg Tab Take 5 mg by mouth 2 (two) times daily.    meloxicam (MOBIC) 15 MG tablet Take 15 mg by mouth.    rosuvastatin (CRESTOR) 20 MG tablet Take 20 mg by mouth every evening.    sertraline (ZOLOFT) 50 MG tablet Take 50 mg by mouth.    sotaloL (BETAPACE) 120 MG Tab Take 120 mg by mouth 2 (two) times daily.       Review of patient's allergies indicates:   Allergen Reactions    Tomato     Weed pollen        History reviewed. No pertinent family history.    Social History     Socioeconomic History    Marital status:    Tobacco Use    Smoking status: Never    Smokeless tobacco: Never   Substance and Sexual Activity    Alcohol use: Yes     Comment: socially    Drug use: Never       Chief Complaint:   Chief Complaint   Patient presents with    Fall     Pt. Reports a trip and fall onto L hip, denies striking head/LOC, GCS 15. PMS in tact. Pt. Given 100mcg Fentanyl in route.        History of present illness:  Patient is a 73-year-old female  that fell yesterday while painting when she slipped.  She fell onto her left hip and sustained a left hip femoral neck fracture.  She is an active community ambulator.  She has a past medical history of atrial fibrillation and takes Eliquis.  Her last dose of Eliquis was yesterday.      Review of Systems:    Constitution: Negative for chills, fever, and sweats.  Negative for unexplained weight loss.    HENT:  Negative for headaches and blurry vision.    Cardiovascular:Negative for chest pain or irregular heart beat. Negative for hypertension.    Respiratory:  Negative for cough and shortness of breath.    Gastrointestinal: Negative for abdominal pain, heartburn, melena, nausea, and vomitting.    Genitourinary:  Negative bladder incontinence and dysuria.    Musculoskeletal:  See HPI    Neurological: Negative for numbness.    Psychiatric/Behavioral: Negative for depression.  The patient is not nervous/anxious.      Endocrine: Negative for polyuria    Hematologic/Lymphatic: Negative for bleeding problem.  Does not bruise/bleed easily.    Skin: Negative for poor would healing and rash      Physical Examination:    Vital Signs:    Vitals:    11/26/23 0934   BP: 137/73   Pulse:    Resp:    Temp:        Body mass index is 25.75 kg/m².    This a well-developed, well nourished patient in no acute distress.  They are alert and oriented and cooperative to examination.    Physical exam  General exam:  Well groomed, well nourished, no acute distress  Alert and oriented x3  HEENT:  Pupils are equal, round, and reactive to light, normocephalic, atraumatic  Cardiovascular:  S1 and S2 heard, no murmurs  Pulmonary:  Lungs clear to auscultation bilaterally  Gastrointestinal:  Positive bowel sounds, soft, nontender, nondistended  Left hip exam:   Left lower extremity is shortened and externally rotated with tenderness in the proximal thigh and mild swelling   2+ pulses with intact sensory and motor function   No open wounds   No  bruising today noted   Tenderness anteriorly and laterally on the proximal thigh and pain with motion  Strong dorsiflexion and plantar flexion of the great and lesser toes as well as the ankle    X-rays:  Radiographs consistent with a displaced subcapital femoral neck fracture of the left hip     Assessment::  Closed displaced subcapital femoral neck fracture left hip    Plan:  Plan will be to transfer the patient to [facility] for treatment with hip replacement tomorrow    Patient and her  are in agreement    Risks, benefits, alternatives, and complications were explained to the patient and/or patient representative. They understand, agree, and want to proceed with the operation/ procedure.      This note was created using [employer] voice recognition software that occasionally misinterpreted phrases or words.    Consult note is delivered via Epic messaging service.

## 2023-11-26 NOTE — PROGRESS NOTES
Hospital Medicine  Progress Note    Patient Name: Nel Recinos  MRN: [id number]  Status: IP- Inpatient   Admission Date: 11/25/2023  Length of Stay: 1  Date of Service: 11/26/2023       CC: hospital follow-up for hip fracture       SUBJECTIVE   73 year old female with a history that includes A fib on Eliquis, presented to the ED 11/25 after a trip and fall, landing on her left hip.  Patient notes home being renovated at present and she tripped over the visqueen on the floor. Denied hitting her head or LOC.   Evaluation in ED included x-ray noting subcapital left femoral neck fracture.   Orthopedics was consulted and patient was admitted to Hospital Medicine services.    Today: Patient seen and examined at bedside, and chart reviewed.  Seen by Orthopedics, who would like to move her to [facility] for fixation.  Still in severe pain, otherwise stable.      MEDICATIONS   Scheduled   atorvastatin  20 mg Oral QHS    pantoprazole  40 mg Oral QAM    sertraline  50 mg Oral Daily    sotaloL  120 mg Oral BID     Continuous Infusions   lactated ringers 75 mL/hr at 11/25/23 2145       PHYSICAL EXAM   VITALS: T 97.5 °F (36.4 °C)   /73   P 68   RR 16   O2 (!) 94 %    GENERAL: Awake and in NAD  LUNGS: CTA B/L  CVS: Normal rate  GI/: Soft, NT, bowel sounds positive.  EXTREMITIES: LLE shorted, externally rotated, PP intact.  NEURO: AAOx3  PSYCH: Cooperative      LABS   CBC  Recent Labs     11/25/23  1608 11/26/23  0357   WBC 8.35 9.93   RBC 3.95* 3.76*   HGB 12.0 11.3*   HCT 35.0* 34.0*   MCV 88.6 90.4   MCH 30.4 30.1   MCHC 34.3 33.2   RDW 12.7 12.7    198     CHEM  Recent Labs     11/25/23  1608 11/26/23  0357    140   K 4.1 4.1   CHLORIDE 107 109*   CO2 24 23   BUN 15.9 13.1   CREATININE 0.70 0.72   GLUCOSE 99 134*   CALCIUM 9.2 9.0   MG  --  1.80   PHOS  --  3.5   ALBUMIN 4.1 3.7   GLOBULIN 2.3* 2.4   ALKPHOS 46 39*   ALT 15 14   AST 19 18   BILITOT 0.9 0.9         ASSESSMENT   Left IT femur fracture  (closed/displaced)  Afib on Eliquis (last dose 11/24 PM)    PLAN   Ortho planning for fixation, ok to move her over to [facility] if accepted by  there  Continue to hold home AC for now, hopefully resume post-operatively  Otherwise continue current management and monitoring in the interim, including analgesics.          Prophylaxis: holding AC pending surgical fixation        Amadou Sheffield MD  [facility] Medicine

## 2023-11-26 NOTE — PLAN OF CARE
Problem: Adult Inpatient Plan of Care  Goal: Plan of Care Review  Outcome: Ongoing, Progressing  Goal: Patient-Specific Goal (Individualized)  Outcome: Ongoing, Progressing  Goal: Absence of Hospital-Acquired Illness or Injury  Outcome: Ongoing, Progressing  Goal: Optimal Comfort and Wellbeing  Outcome: Ongoing, Progressing  Goal: Readiness for Transition of Care  Outcome: Ongoing, Progressing     Problem: Fall Injury Risk  Goal: Absence of Fall and Fall-Related Injury  Outcome: Ongoing, Progressing     Problem: Skin Injury Risk Increased  Goal: Skin Health and Integrity  Outcome: Ongoing, Progressing     Problem: Infection  Goal: Absence of Infection Signs and Symptoms  Outcome: Ongoing, Progressing     Problem: Bleeding (Orthopaedic Fracture)  Goal: Absence of Bleeding  Outcome: Ongoing, Progressing     Problem: Embolism (Orthopaedic Fracture)  Goal: Absence of Embolism Signs and Symptoms  Outcome: Ongoing, Progressing     Problem: Fracture Stabilization and Management (Orthopaedic Fracture)  Goal: Fracture Stability  Outcome: Ongoing, Progressing     Problem: Functional Ability Impaired (Orthopaedic Fracture)  Goal: Optimal Functional Ability  Outcome: Ongoing, Progressing     Problem: Infection (Orthopaedic Fracture)  Goal: Absence of Infection Signs and Symptoms  Outcome: Ongoing, Progressing     Problem: Neurovascular Compromise (Orthopaedic Fracture)  Goal: Effective Tissue Perfusion  Outcome: Ongoing, Progressing     Problem: Pain (Orthopaedic Fracture)  Goal: Acceptable Pain Control  Outcome: Ongoing, Progressing     Problem: Respiratory Compromise (Orthopaedic Fracture)  Goal: Effective Oxygenation and Ventilation  Outcome: Ongoing, Progressing     Problem: Asthma Comorbidity  Goal: Maintenance of Asthma Control  Outcome: Ongoing, Progressing     Problem: Behavioral Health Comorbidity  Goal: Maintenance of Behavioral Health Symptom Control  Outcome: Ongoing, Progressing

## 2023-11-26 NOTE — H&P
[facility] Hospital Medicine History & Physical Examination       Patient Name: Nel Recinos  MRN: [id number]  Patient Class: IP- Inpatient   Admission Date: 11/25/2023  3:08 PM  Length of Stay: 0  Admitting Service: Hospital Medicine   Attending Physician: Jesus Ibrahim MD   Primary Care Provider: Priyank Eubanks Jr.  History source: EMR, patient and/or patient's family    CHIEF COMPLAINT   Fall (Pt. Reports a trip and fall onto L hip, denies striking head/LOC, GCS 15. PMS in tact. Pt. Given 100mcg Fentanyl in route. )    HISTORY OF PRESENT ILLNESS:   Ms. Recinos is a 73 year old female with a pmh of A fib on Eliquis, Asthma, HLD, Anxiety, arthritis, GERD, LUE Lipoma who presented to the ED after a trip and fall, landing on her left hip.  She states that she is pain and cabinets in her home and tripped over the visqueen on the floor today landing on her left hip. Denies hitting her head or LOC.     ED vitals on arrival:  Temp 97.5° F, pulse 75, resp 20, /94, SpO2 98% on RA.  Today's ED lab work was wholly unremarkable.  Left hip x-ray with pelvis showed subcapital left femoral neck fracture.  CXR showed no acute cardiopulmonary abnormality.  CT head without contrast showed no acute intracranial process identified.  Orthopedics was consulted in the ED.  She was admitted to Hospital Medicine for management.      PAST MEDICAL HISTORY:     Atrial fibrillation on Eliquis  Asthma  Hyperlipidemia  Anxiety  Arthritis  GERD  LUE Lipoma    PAST SURGICAL HISTORY:     Right thumb joint replacement   Left carpal tunnel release  Appendectomy   Bilateral tubal ligation   Bilateral foot surgery    ALLERGIES:   Tomato and Weed pollen    FAMILY HISTORY:   Reviewed and non-contributory     SOCIAL HISTORY:     Social History     Tobacco Use    Smoking status: Never    Smokeless tobacco: Never   Substance Use Topics    Alcohol use: Yes     Comment: socially        HOME MEDICATIONS:  "    Prior to Admission medications    Medication Sig Start Date End Date Taking? Authorizing Provider   pantoprazole (PROTONIX) 40 MG tablet Take 1 tablet by mouth every morning. 11/15/23  Yes Provider, Historical   ELIQUIS 5 mg Tab Take 5 mg by mouth 2 (two) times daily.    Provider, Historical   meloxicam (MOBIC) 15 MG tablet Take 15 mg by mouth.    Provider, Historical   rosuvastatin (CRESTOR) 20 MG tablet Take 20 mg by mouth every evening.    Provider, Historical   sertraline (ZOLOFT) 50 MG tablet Take 50 mg by mouth.    Provider, Historical   sotaloL (BETAPACE) 120 MG Tab Take 120 mg by mouth 2 (two) times daily.    Provider, Historical       REVIEW OF SYSTEMS:   Except as documented, all other systems reviewed and negative     PHYSICAL EXAM:   T 97.5 °F (36.4 °C)   /73   P (!) 59   RR 16   O2 96 %  GENERAL: Well developed, well nourished. In no acute distress, non-toxic appearing.   HEENT: normocephalic atraumatic   NECK: supple   LUNGS: Clear bilaterally, no wheezing or rales, no accessory muscle use   CVS: Regular rate and rhythm, normal peripheral perfusion  ABD: Soft, non-tender, non-distended, bowel sounds present  EXTREMITIES: LLE externally rotated, painful to straighten  SKIN: Warm, dry.   NEURO: alert and oriented, grossly without focal deficits   PSYCHIATRIC: Cooperative    LABS AND IMAGING:     Recent Labs     11/25/23  1608   WBC 8.35   RBC 3.95*   HGB 12.0   HCT 35.0*   MCV 88.6   MCH 30.4   MCHC 34.3   RDW 12.7        No results for input(s): "LACTIC" in the last 72 hours.  No results for input(s): "INR", "APTT", "D-DIMER" in the last 72 hours.  No results for input(s): "HGBA1C", "CHOL", "TRIG", "LDL", "VLDL", "HDL" in the last 72 hours.   Recent Labs     11/25/23  1608      K 4.1   CHLORIDE 107   CO2 24   BUN 15.9   CREATININE 0.70   GLUCOSE 99   CALCIUM 9.2   ALBUMIN 4.1   GLOBULIN 2.3*   ALKPHOS 46   ALT 15   AST 19   BILITOT 0.9     No results for input(s): "BNP", "CPK", " ""TROPONINI" in the last 72 hours.       CT Head Without Contrast  START OF REPORT:  Technique: CT of the head was performed without intravenous contrast with axial as well as coronal and sagittal images.    Comparison: None.    Dosage Information: Automated Exposure Control was utilized 924.29 mGy.cm.    Clinical history: Fall on thinners.    Findings:  Hemorrhage: No acute intracranial hemorrhage is seen.  CSF spaces: The ventricles, sulci and basal cisterns all appear mildly prominent consistent with global cerebral atrophy.  Brain parenchyma: There is preservation of the grey white junction throughout. No acute infarct. Microvascular change is seen in portions of the periventricular and deep white matter tracts.  Cerebellum: Unremarkable.  Vascular: Mild atheromatous calcification of the intracranial arteries is seen.  Sella and skull base: The sella appears to be within normal limits for age.  Intracranial calcifications: Incidental note is made of bilateral choroid plexus calcification. Incidental note is made of some pineal region calcification. Incidental note is made of some calcification of the falx. Incidental note is made of some cerebellar calcification.  Calvarium: No acute linear or depressed skull fracture is seen.    Maxillofacial Structures:  Paranasal sinuses: The visualized paranasal sinuses appear clear with no mucoperiosteal thickening or air fluid levels identified.  Orbits: The orbits appear unremarkable.  Zygomatic arches: The zygomatic arches are intact and unremarkable.  Temporal bones and mastoids: The temporal bones and mastoids appear unremarkable.  TMJ: The mandibular condyles appear normally placed with respect to the mandibular fossa.  Nasal Bones: No displaced nasal bone fracture is seen.    Visualized upper cervical spine: The visualized cervical spine appears unremarkable.    Impression:  1. No acute intracranial process identified. Details and other findings as noted above.  X-Ray " Chest AP Portable  Narrative: EXAMINATION:  XR CHEST AP PORTABLE    CLINICAL HISTORY:  Encounter for other preprocedural examination    TECHNIQUE:  Single frontal view of the chest was performed.    COMPARISON:  12/27/2016    FINDINGS:  LINES AND TUBES: EKG/telemetry leads overlie the chest.  Bra straps minimally obscure evaluation.    MEDIASTINUM AND JUAN: The cardiac silhouette is normal. Aortic atherosclerosis.    LUNGS: No lobar consolidation. No edema.    PLEURA:No pleural effusion. No pneumothorax.    BONES: No acute osseous abnormality.  Impression: No acute cardiopulmonary abnormality.    Electronically signed by: Julienne Wright  Date:    11/25/2023  Time:    16:14  X-Ray Hip 2 or 3 views Left (with Pelvis when performed)  Narrative: EXAMINATION:  XR HIP WITH PELVIS WHEN PERFORMED, 2 OR 3 VIEWS LEFT    CLINICAL HISTORY:  hip pain;    TECHNIQUE:  AP view of the pelvis and AP and frog leg lateral view of the left hip were performed.    COMPARISON:  None.    FINDINGS:  BONE: Subcapital left femoral neck fracture.  No dislocation.  Patient is rotated to the left.    SOFT TISSUES: Unremarkable.  Impression: Subcapital left femoral neck fracture.    Electronically signed by: Julienne Wright  Date:    11/25/2023  Time:    15:54      ASSESSMENT & PLAN:     1. Ground level fall resulting in left subcapital femoral neck fracture    History:  A fib on Eliquis, Asthma, HLD, Anxiety, arthritis, GERD, LUE Lipoma    PLAN:  -Orthopedics consulted in ED  -Plan for surgery tomorrow  -NPO after midnight  -Hold Eliquis for now  -EKG in a.m.  -Pain control  -LR @ 75 ml/hr  -Fall precautions  -Antihypertensives as needed  -Resume home meds as appropriate  -Labs in AM    I, Isa Quinteros, MARIN have reviewed and discussed this case with Dr. Ibrahim.  Please see addendum for further assessment and plan from attending MD.    DVT prophylaxis: Eliquis on hold until OK from orthopedics to restart  Code status:  Full    ________________________________________________________________________  I, Dr. Jesus Ibrahim assumed care of this patient.  For the patient encounter, I performed the substantive portion of the visit, I reviewed the NPPA documentation, treatment plan, and medical decision making.  I had face to face time with this patient.  I have personally reviewed the labs and test results that are presently available. I have reviewed or attempted to review medical records based upon their availability. If patient was admitted under observational status it is with my approval.      73-year-old female with mechanical fall resulting in a closed left subcapital femoral neck fracture.  On exam she is cardiovascular rate and rhythm, euvolemic, and agree with remainder of above.  Denies history of cardiac stents, strokes or diabetes.  She does have AFib in her Eliquis is on hold.  Obtain a preop EKG, NPO after midnight, orthopedic surgery evaluation.      Time seen: 11PM 11/25/23  Jesus Ibrahim MD

## 2023-11-27 ENCOUNTER — ANESTHESIA EVENT (OUTPATIENT)
Dept: SURGERY | Facility: HOSPITAL | Age: 73
DRG: 522 | End: 2023-11-27
Payer: MEDICARE

## 2023-11-27 ENCOUNTER — ANESTHESIA (OUTPATIENT)
Dept: SURGERY | Facility: HOSPITAL | Age: 73
DRG: 522 | End: 2023-11-27
Payer: MEDICARE

## 2023-11-27 PROBLEM — S72.002A CLOSED DISPLACED FRACTURE OF NECK OF LEFT FEMUR: Status: ACTIVE | Noted: 2023-11-25

## 2023-11-27 LAB
HCT VFR BLD AUTO: 30.9 % (ref 37–47)
HGB BLD-MCNC: 10.2 G/DL (ref 12–16)

## 2023-11-27 PROCEDURE — 27201423 OPTIME MED/SURG SUP & DEVICES STERILE SUPPLY: Performed by: ORTHOPAEDIC SURGERY

## 2023-11-27 PROCEDURE — D9220A PRA ANESTHESIA: ICD-10-PCS | Mod: ANES,,, | Performed by: ANESTHESIOLOGY

## 2023-11-27 PROCEDURE — D9220A PRA ANESTHESIA: Mod: CRNA,,, | Performed by: NURSE ANESTHETIST, CERTIFIED REGISTERED

## 2023-11-27 PROCEDURE — 27130 PR TOTAL HIP ARTHROPLASTY: ICD-10-PCS | Mod: LT,,, | Performed by: ORTHOPAEDIC SURGERY

## 2023-11-27 PROCEDURE — 99900035 HC TECH TIME PER 15 MIN (STAT)

## 2023-11-27 PROCEDURE — 11000001 HC ACUTE MED/SURG PRIVATE ROOM

## 2023-11-27 PROCEDURE — 36000712 HC OR TIME LEV V 1ST 15 MIN: Performed by: ORTHOPAEDIC SURGERY

## 2023-11-27 PROCEDURE — 85014 HEMATOCRIT: CPT | Performed by: ORTHOPAEDIC SURGERY

## 2023-11-27 PROCEDURE — 27130 TOTAL HIP ARTHROPLASTY: CPT | Mod: LT,,, | Performed by: ORTHOPAEDIC SURGERY

## 2023-11-27 PROCEDURE — 63600175 PHARM REV CODE 636 W HCPCS: Performed by: NURSE ANESTHETIST, CERTIFIED REGISTERED

## 2023-11-27 PROCEDURE — C1713 ANCHOR/SCREW BN/BN,TIS/BN: HCPCS | Performed by: ORTHOPAEDIC SURGERY

## 2023-11-27 PROCEDURE — 0055T PR COMPUTER-ASSIST MUSCSKEL NAVIG, ORTHO PROC, CT/MRI: ICD-10-PCS | Mod: ,,, | Performed by: ORTHOPAEDIC SURGERY

## 2023-11-27 PROCEDURE — C1776 JOINT DEVICE (IMPLANTABLE): HCPCS | Performed by: ORTHOPAEDIC SURGERY

## 2023-11-27 PROCEDURE — 0055T BONE SRGRY CMPTR CT/MRI IMAG: CPT | Mod: ,,, | Performed by: ORTHOPAEDIC SURGERY

## 2023-11-27 PROCEDURE — 88311 DECALCIFY TISSUE: CPT

## 2023-11-27 PROCEDURE — 37000009 HC ANESTHESIA EA ADD 15 MINS: Performed by: ORTHOPAEDIC SURGERY

## 2023-11-27 PROCEDURE — 36000713 HC OR TIME LEV V EA ADD 15 MIN: Performed by: ORTHOPAEDIC SURGERY

## 2023-11-27 PROCEDURE — 88305 TISSUE EXAM BY PATHOLOGIST: CPT | Performed by: ORTHOPAEDIC SURGERY

## 2023-11-27 PROCEDURE — 25000003 PHARM REV CODE 250: Performed by: INTERNAL MEDICINE

## 2023-11-27 PROCEDURE — 63600175 PHARM REV CODE 636 W HCPCS: Performed by: INTERNAL MEDICINE

## 2023-11-27 PROCEDURE — 25000003 PHARM REV CODE 250: Performed by: ORTHOPAEDIC SURGERY

## 2023-11-27 PROCEDURE — D9220A PRA ANESTHESIA: ICD-10-PCS | Mod: CRNA,,, | Performed by: NURSE ANESTHETIST, CERTIFIED REGISTERED

## 2023-11-27 PROCEDURE — 63600175 PHARM REV CODE 636 W HCPCS: Performed by: ORTHOPAEDIC SURGERY

## 2023-11-27 PROCEDURE — D9220A PRA ANESTHESIA: Mod: ANES,,, | Performed by: ANESTHESIOLOGY

## 2023-11-27 PROCEDURE — 37000008 HC ANESTHESIA 1ST 15 MINUTES: Performed by: ORTHOPAEDIC SURGERY

## 2023-11-27 PROCEDURE — 25000003 PHARM REV CODE 250: Performed by: NURSE PRACTITIONER

## 2023-11-27 PROCEDURE — A4216 STERILE WATER/SALINE, 10 ML: HCPCS | Performed by: ORTHOPAEDIC SURGERY

## 2023-11-27 PROCEDURE — 71000033 HC RECOVERY, INTIAL HOUR: Performed by: ORTHOPAEDIC SURGERY

## 2023-11-27 PROCEDURE — 25000003 PHARM REV CODE 250: Performed by: NURSE ANESTHETIST, CERTIFIED REGISTERED

## 2023-11-27 DEVICE — TRIDENT II TRITANIUM CLUSTER 46C
Type: IMPLANTABLE DEVICE | Site: HIP | Status: FUNCTIONAL
Brand: TRIDENT II

## 2023-11-27 DEVICE — CERAMIC V40 FEMORAL HEAD
Type: IMPLANTABLE DEVICE | Site: HIP | Status: FUNCTIONAL
Brand: BIOLOX

## 2023-11-27 DEVICE — TRIDENT X3 0 DEGREE POLYETHYLENE INSERT
Type: IMPLANTABLE DEVICE | Site: HIP | Status: FUNCTIONAL
Brand: TRIDENT X3 INSERT

## 2023-11-27 DEVICE — 127 DEGREE NECK ANGLE HIP STEM
Type: IMPLANTABLE DEVICE | Site: HIP | Status: FUNCTIONAL
Brand: ACCOLADE

## 2023-11-27 RX ORDER — LIDOCAINE HYDROCHLORIDE 10 MG/ML
1 INJECTION, SOLUTION EPIDURAL; INFILTRATION; INTRACAUDAL; PERINEURAL ONCE
Status: CANCELLED | OUTPATIENT
Start: 2023-11-27 | End: 2023-11-27

## 2023-11-27 RX ORDER — KETOROLAC TROMETHAMINE 30 MG/ML
INJECTION, SOLUTION INTRAMUSCULAR; INTRAVENOUS
Status: DISPENSED
Start: 2023-11-27 | End: 2023-11-28

## 2023-11-27 RX ORDER — SODIUM CITRATE AND CITRIC ACID MONOHYDRATE 334; 500 MG/5ML; MG/5ML
SOLUTION ORAL
Status: DISPENSED
Start: 2023-11-27 | End: 2023-11-28

## 2023-11-27 RX ORDER — FAMOTIDINE 20 MG/1
20 TABLET, FILM COATED ORAL 2 TIMES DAILY
Status: DISCONTINUED | OUTPATIENT
Start: 2023-11-27 | End: 2023-12-01 | Stop reason: HOSPADM

## 2023-11-27 RX ORDER — TALC
6 POWDER (GRAM) TOPICAL NIGHTLY PRN
Status: DISCONTINUED | OUTPATIENT
Start: 2023-11-27 | End: 2023-12-01 | Stop reason: HOSPADM

## 2023-11-27 RX ORDER — EPHEDRINE SULFATE 50 MG/ML
INJECTION, SOLUTION INTRAVENOUS
Status: DISCONTINUED | OUTPATIENT
Start: 2023-11-27 | End: 2023-11-27

## 2023-11-27 RX ORDER — MORPHINE SULFATE 10 MG/ML
INJECTION INTRAMUSCULAR; INTRAVENOUS; SUBCUTANEOUS
Status: DISPENSED
Start: 2023-11-27 | End: 2023-11-28

## 2023-11-27 RX ORDER — SODIUM CHLORIDE 0.9 % (FLUSH) 0.9 %
SYRINGE (ML) INJECTION
Status: DISPENSED
Start: 2023-11-27 | End: 2023-11-28

## 2023-11-27 RX ORDER — MAG HYDROX/ALUMINUM HYD/SIMETH 200-200-20
30 SUSPENSION, ORAL (FINAL DOSE FORM) ORAL EVERY 6 HOURS PRN
Status: DISCONTINUED | OUTPATIENT
Start: 2023-11-27 | End: 2023-11-28

## 2023-11-27 RX ORDER — MIDAZOLAM HYDROCHLORIDE 1 MG/ML
2 INJECTION INTRAMUSCULAR; INTRAVENOUS
Status: CANCELLED | OUTPATIENT
Start: 2023-11-27 | End: 2023-11-27

## 2023-11-27 RX ORDER — PROCHLORPERAZINE EDISYLATE 5 MG/ML
5 INJECTION INTRAMUSCULAR; INTRAVENOUS EVERY 30 MIN PRN
Status: DISCONTINUED | OUTPATIENT
Start: 2023-11-27 | End: 2023-11-28

## 2023-11-27 RX ORDER — MIDAZOLAM HYDROCHLORIDE 1 MG/ML
INJECTION INTRAMUSCULAR; INTRAVENOUS
Status: DISCONTINUED | OUTPATIENT
Start: 2023-11-27 | End: 2023-11-27

## 2023-11-27 RX ORDER — CEFAZOLIN SODIUM 1 G/3ML
INJECTION, POWDER, FOR SOLUTION INTRAMUSCULAR; INTRAVENOUS
Status: DISCONTINUED | OUTPATIENT
Start: 2023-11-27 | End: 2023-11-27

## 2023-11-27 RX ORDER — HYDROMORPHONE HYDROCHLORIDE 2 MG/ML
0.2 INJECTION, SOLUTION INTRAMUSCULAR; INTRAVENOUS; SUBCUTANEOUS EVERY 5 MIN PRN
Status: DISCONTINUED | OUTPATIENT
Start: 2023-11-27 | End: 2023-11-28

## 2023-11-27 RX ORDER — EPINEPHRINE 1 MG/ML
INJECTION, SOLUTION, CONCENTRATE INTRAVENOUS
Status: DISPENSED
Start: 2023-11-27 | End: 2023-11-28

## 2023-11-27 RX ORDER — ROPIVACAINE HYDROCHLORIDE 5 MG/ML
INJECTION, SOLUTION EPIDURAL; INFILTRATION; PERINEURAL
Status: DISCONTINUED | OUTPATIENT
Start: 2023-11-27 | End: 2023-11-27 | Stop reason: HOSPADM

## 2023-11-27 RX ORDER — MORPHINE SULFATE 4 MG/ML
2 INJECTION, SOLUTION INTRAMUSCULAR; INTRAVENOUS
Status: ACTIVE | OUTPATIENT
Start: 2023-11-27 | End: 2023-11-28

## 2023-11-27 RX ORDER — ONDANSETRON 2 MG/ML
INJECTION INTRAMUSCULAR; INTRAVENOUS
Status: DISCONTINUED | OUTPATIENT
Start: 2023-11-27 | End: 2023-11-27

## 2023-11-27 RX ORDER — MEPERIDINE HYDROCHLORIDE 25 MG/ML
12.5 INJECTION INTRAMUSCULAR; INTRAVENOUS; SUBCUTANEOUS EVERY 10 MIN PRN
Status: ACTIVE | OUTPATIENT
Start: 2023-11-27 | End: 2023-11-28

## 2023-11-27 RX ORDER — SODIUM CITRATE AND CITRIC ACID MONOHYDRATE 334; 500 MG/5ML; MG/5ML
30 SOLUTION ORAL ONCE
Status: COMPLETED | OUTPATIENT
Start: 2023-11-27 | End: 2023-11-27

## 2023-11-27 RX ORDER — BISACODYL 10 MG
10 SUPPOSITORY, RECTAL RECTAL DAILY
Status: ACTIVE | OUTPATIENT
Start: 2023-11-30 | End: 2023-12-01

## 2023-11-27 RX ORDER — ROCURONIUM BROMIDE 10 MG/ML
INJECTION, SOLUTION INTRAVENOUS
Status: DISCONTINUED | OUTPATIENT
Start: 2023-11-27 | End: 2023-11-27

## 2023-11-27 RX ORDER — PROPOFOL 10 MG/ML
VIAL (ML) INTRAVENOUS
Status: DISCONTINUED | OUTPATIENT
Start: 2023-11-27 | End: 2023-11-27

## 2023-11-27 RX ORDER — ONDANSETRON 2 MG/ML
4 INJECTION INTRAMUSCULAR; INTRAVENOUS DAILY PRN
Status: DISCONTINUED | OUTPATIENT
Start: 2023-11-27 | End: 2023-11-28

## 2023-11-27 RX ORDER — GLYCOPYRROLATE 0.2 MG/ML
INJECTION INTRAMUSCULAR; INTRAVENOUS
Status: DISCONTINUED | OUTPATIENT
Start: 2023-11-27 | End: 2023-11-27

## 2023-11-27 RX ORDER — LIDOCAINE HYDROCHLORIDE 10 MG/ML
INJECTION, SOLUTION EPIDURAL; INFILTRATION; INTRACAUDAL; PERINEURAL
Status: DISCONTINUED | OUTPATIENT
Start: 2023-11-27 | End: 2023-11-27

## 2023-11-27 RX ORDER — ACETAMINOPHEN 500 MG
500 TABLET ORAL
Status: DISCONTINUED | OUTPATIENT
Start: 2023-11-27 | End: 2023-12-01 | Stop reason: HOSPADM

## 2023-11-27 RX ORDER — SODIUM CHLORIDE, SODIUM GLUCONATE, SODIUM ACETATE, POTASSIUM CHLORIDE AND MAGNESIUM CHLORIDE 30; 37; 368; 526; 502 MG/100ML; MG/100ML; MG/100ML; MG/100ML; MG/100ML
INJECTION, SOLUTION INTRAVENOUS CONTINUOUS
Status: CANCELLED | OUTPATIENT
Start: 2023-11-27 | End: 2023-12-27

## 2023-11-27 RX ORDER — METOCLOPRAMIDE HYDROCHLORIDE 5 MG/ML
10 INJECTION INTRAMUSCULAR; INTRAVENOUS EVERY 6 HOURS
Status: COMPLETED | OUTPATIENT
Start: 2023-11-27 | End: 2023-11-28

## 2023-11-27 RX ORDER — ROPIVACAINE HYDROCHLORIDE 5 MG/ML
INJECTION, SOLUTION EPIDURAL; INFILTRATION; PERINEURAL
Status: DISPENSED
Start: 2023-11-27 | End: 2023-11-28

## 2023-11-27 RX ORDER — SODIUM CHLORIDE 9 MG/ML
INJECTION, SOLUTION INTRAMUSCULAR; INTRAVENOUS; SUBCUTANEOUS
Status: DISCONTINUED | OUTPATIENT
Start: 2023-11-27 | End: 2023-11-27 | Stop reason: HOSPADM

## 2023-11-27 RX ORDER — LACTULOSE 10 G/15ML
20 SOLUTION ORAL EVERY 6 HOURS PRN
Status: DISCONTINUED | OUTPATIENT
Start: 2023-11-27 | End: 2023-12-01 | Stop reason: HOSPADM

## 2023-11-27 RX ORDER — ACETAMINOPHEN 500 MG
1000 TABLET ORAL ONCE
Status: CANCELLED | OUTPATIENT
Start: 2023-11-27 | End: 2023-11-27

## 2023-11-27 RX ORDER — EPINEPHRINE 1 MG/ML
INJECTION, SOLUTION, CONCENTRATE INTRAVENOUS
Status: DISCONTINUED | OUTPATIENT
Start: 2023-11-27 | End: 2023-11-27 | Stop reason: HOSPADM

## 2023-11-27 RX ORDER — DIPHENHYDRAMINE HYDROCHLORIDE 50 MG/ML
25 INJECTION INTRAMUSCULAR; INTRAVENOUS EVERY 6 HOURS PRN
Status: DISCONTINUED | OUTPATIENT
Start: 2023-11-27 | End: 2023-11-30

## 2023-11-27 RX ORDER — KETOROLAC TROMETHAMINE 30 MG/ML
15 INJECTION, SOLUTION INTRAMUSCULAR; INTRAVENOUS EVERY 6 HOURS
Status: COMPLETED | OUTPATIENT
Start: 2023-11-27 | End: 2023-11-28

## 2023-11-27 RX ORDER — MORPHINE SULFATE 10 MG/ML
INJECTION INTRAMUSCULAR; INTRAVENOUS; SUBCUTANEOUS
Status: DISCONTINUED | OUTPATIENT
Start: 2023-11-27 | End: 2023-11-27 | Stop reason: HOSPADM

## 2023-11-27 RX ORDER — SODIUM CHLORIDE 9 MG/ML
INJECTION, SOLUTION INTRAVENOUS CONTINUOUS
Status: DISCONTINUED | OUTPATIENT
Start: 2023-11-27 | End: 2023-11-28

## 2023-11-27 RX ORDER — KETOROLAC TROMETHAMINE 30 MG/ML
INJECTION, SOLUTION INTRAMUSCULAR; INTRAVENOUS
Status: DISCONTINUED | OUTPATIENT
Start: 2023-11-27 | End: 2023-11-27 | Stop reason: HOSPADM

## 2023-11-27 RX ORDER — FENTANYL CITRATE 50 UG/ML
INJECTION, SOLUTION INTRAMUSCULAR; INTRAVENOUS
Status: DISCONTINUED | OUTPATIENT
Start: 2023-11-27 | End: 2023-11-27

## 2023-11-27 RX ORDER — METHOCARBAMOL 500 MG/1
500 TABLET, FILM COATED ORAL EVERY 8 HOURS PRN
Status: DISCONTINUED | OUTPATIENT
Start: 2023-11-27 | End: 2023-12-01 | Stop reason: HOSPADM

## 2023-11-27 RX ORDER — TRAMADOL HYDROCHLORIDE 50 MG/1
50 TABLET ORAL EVERY 4 HOURS PRN
Status: DISCONTINUED | OUTPATIENT
Start: 2023-11-27 | End: 2023-12-01 | Stop reason: HOSPADM

## 2023-11-27 RX ORDER — AMOXICILLIN 250 MG
2 CAPSULE ORAL NIGHTLY
Status: DISCONTINUED | OUTPATIENT
Start: 2023-11-27 | End: 2023-11-30

## 2023-11-27 RX ORDER — ONDANSETRON 2 MG/ML
4 INJECTION INTRAMUSCULAR; INTRAVENOUS EVERY 6 HOURS PRN
Status: DISCONTINUED | OUTPATIENT
Start: 2023-11-27 | End: 2023-12-01 | Stop reason: HOSPADM

## 2023-11-27 RX ORDER — POLYETHYLENE GLYCOL 3350 17 G/17G
17 POWDER, FOR SOLUTION ORAL 2 TIMES DAILY
Status: DISCONTINUED | OUTPATIENT
Start: 2023-11-27 | End: 2023-11-30

## 2023-11-27 RX ORDER — SODIUM CITRATE AND CITRIC ACID MONOHYDRATE 334; 500 MG/5ML; MG/5ML
30 SOLUTION ORAL ONCE
Status: CANCELLED | OUTPATIENT
Start: 2023-11-27 | End: 2023-11-27

## 2023-11-27 RX ADMIN — CEFAZOLIN 2 G: 2 INJECTION, POWDER, FOR SOLUTION INTRAMUSCULAR; INTRAVENOUS at 09:11

## 2023-11-27 RX ADMIN — SOTALOL HYDROCHLORIDE 120 MG: 120 TABLET ORAL at 09:11

## 2023-11-27 RX ADMIN — ACETAMINOPHEN 500 MG: 500 TABLET ORAL at 11:11

## 2023-11-27 RX ADMIN — MIDAZOLAM 2 MG: 1 INJECTION INTRAMUSCULAR; INTRAVENOUS at 03:11

## 2023-11-27 RX ADMIN — LIDOCAINE HYDROCHLORIDE 50 MG: 10 INJECTION, SOLUTION EPIDURAL; INFILTRATION; INTRACAUDAL; PERINEURAL at 03:11

## 2023-11-27 RX ADMIN — FAMOTIDINE 20 MG: 20 TABLET ORAL at 09:11

## 2023-11-27 RX ADMIN — FENTANYL CITRATE 100 MCG: 50 INJECTION, SOLUTION INTRAMUSCULAR; INTRAVENOUS at 03:11

## 2023-11-27 RX ADMIN — ONDANSETRON HYDROCHLORIDE 4 MG: 2 SOLUTION INTRAMUSCULAR; INTRAVENOUS at 04:11

## 2023-11-27 RX ADMIN — METOCLOPRAMIDE HYDROCHLORIDE 10 MG: 5 INJECTION INTRAMUSCULAR; INTRAVENOUS at 09:11

## 2023-11-27 RX ADMIN — GLYCOPYRROLATE 0.2 MG: 0.2 INJECTION INTRAMUSCULAR; INTRAVENOUS at 03:11

## 2023-11-27 RX ADMIN — SODIUM CHLORIDE, SODIUM GLUCONATE, SODIUM ACETATE, POTASSIUM CHLORIDE AND MAGNESIUM CHLORIDE: 526; 502; 368; 37; 30 INJECTION, SOLUTION INTRAVENOUS at 03:11

## 2023-11-27 RX ADMIN — EPHEDRINE SULFATE 10 MG: 50 INJECTION INTRAVENOUS at 03:11

## 2023-11-27 RX ADMIN — EPHEDRINE SULFATE 30 MG: 50 INJECTION, SOLUTION INTRAVENOUS at 03:11

## 2023-11-27 RX ADMIN — ATORVASTATIN CALCIUM 20 MG: 10 TABLET, FILM COATED ORAL at 09:11

## 2023-11-27 RX ADMIN — OXYCODONE HYDROCHLORIDE AND ACETAMINOPHEN 1 TABLET: 10; 325 TABLET ORAL at 10:11

## 2023-11-27 RX ADMIN — SUGAMMADEX 200 MG: 100 INJECTION, SOLUTION INTRAVENOUS at 04:11

## 2023-11-27 RX ADMIN — OXYCODONE HYDROCHLORIDE AND ACETAMINOPHEN 1 TABLET: 10; 325 TABLET ORAL at 07:11

## 2023-11-27 RX ADMIN — OXYCODONE HYDROCHLORIDE AND ACETAMINOPHEN 1 TABLET: 10; 325 TABLET ORAL at 05:11

## 2023-11-27 RX ADMIN — ACETAMINOPHEN 500 MG: 500 TABLET ORAL at 09:11

## 2023-11-27 RX ADMIN — HYDROMORPHONE HYDROCHLORIDE 1 MG: 2 INJECTION INTRAMUSCULAR; INTRAVENOUS; SUBCUTANEOUS at 05:11

## 2023-11-27 RX ADMIN — SERTRALINE HYDROCHLORIDE 50 MG: 50 TABLET ORAL at 08:11

## 2023-11-27 RX ADMIN — KETOROLAC TROMETHAMINE 15 MG: 30 INJECTION, SOLUTION INTRAMUSCULAR at 09:11

## 2023-11-27 RX ADMIN — SENNOSIDES AND DOCUSATE SODIUM 2 TABLET: 8.6; 5 TABLET ORAL at 09:11

## 2023-11-27 RX ADMIN — POLYETHYLENE GLYCOL 3350 17 G: 17 POWDER, FOR SOLUTION ORAL at 09:11

## 2023-11-27 RX ADMIN — CEFAZOLIN 2 G: 330 INJECTION, POWDER, FOR SOLUTION INTRAMUSCULAR; INTRAVENOUS at 03:11

## 2023-11-27 RX ADMIN — ROCURONIUM BROMIDE 70 MG: 10 SOLUTION INTRAVENOUS at 03:11

## 2023-11-27 RX ADMIN — SODIUM CITRATE AND CITRIC ACID MONOHYDRATE 30 ML: 500; 334 SOLUTION ORAL at 03:11

## 2023-11-27 RX ADMIN — SOTALOL HYDROCHLORIDE 120 MG: 120 TABLET ORAL at 08:11

## 2023-11-27 RX ADMIN — PROPOFOL 100 MG: 10 INJECTION, EMULSION INTRAVENOUS at 03:11

## 2023-11-27 NOTE — OP NOTE
OPERATIVE REPORT      Patient: Nel Recinos   : 1950    MRN: [id number]  Date: 2023      Surgeon: Isidoro Waters MD  Assistant: none  Preoperative Diagnosis:  Left femoral neck fracture  Postoperative Diagnosis: Left femoral neck fracture  Procedure:  Left Fer total hip arthroplasty (57697)  Anesthesiologist: Wilver Shepard MD  OR Staff: Circulator: Yandy Ovalles RN  Scrub Person: Fei Arzate ST  Implants:   Implant Name Type Inv. Item Serial No.  Lot No. LRB No. Used Action   PIN BONE 4 X 140MM STERILE - [id number]  PIN BONE 4 X 140MM STERILE  FER SURGICAL  Left 1 Implanted and Explanted   PIN BONE 4 X 140MM STERILE - [id number]  PIN BONE 4 X 140MM STERILE  FER SURGICAL  Left 1 Implanted and Explanted   SHELL TRIDENT II CLUSTER 46MM - [id number]  SHELL TRIDENT II CLUSTER 46MM  [employer]. [id number] Left 1 Implanted   INSERT TRIDENT X3 0 DEG 32MM - [id number]  INSERT TRIDENT X3 0 DEG 32MM  ABDUL[employer]. [id number] Left 1 Implanted   STEM ACC II 27 DEG SZ 5 - [id number]  STEM ACC II 27 DEG SZ 5  [employer]UL[employer]. [id number] Left 1 Implanted   HEAD V40 FEMORAL 32MM -4 - [id number]  HEAD V40 FEMORAL 32MM -4  ABDULK[employer]IC. [id number] Left 1 Implanted     EBL: 150  Complications: None  Disposition:  To PACU stable     Indications: Nel Recinos is a 73 y.o. female presenting with ongoing complaints of left hip pain after a fall.  Patient had tried and failed all conservative management.  Despite this he continued to have significant complaints of left hip pain.  Patient felt as though we reached a point of disability with regards to his left hip and would like to proceed with total hip arthroplasty.  The risks, benefits, alternatives to total hip arthroplasty were discussed in detail with the patient.  These include but are limited to infection, bleeding, scarring, need for revision surgery, included resolution of pain, DVT, PE, death.  Despite these risks, he  elected to proceed with surgical intervention.  All questions answered to the patient's satisfaction.  No guarantees made.  The patient voiced understanding and written as well as verbal consent was obtained by myself prior to the procedure.    Procedure Note:  The patient was seen in the preoperative holding area.  He was marked and consented for surgery.  Again all questions were answered.  Taken the operating room was placed under anesthesia.  Placed in the right lateral decubitus position with all bony prominences well padded.  Left hip prepped draped normal sterile fashion.  A time-out was performed verifying correct patient site side and procedure.  All were in agreement.  Preoperative antibiotics were administered as well as TXA.    Three Shantz pins were attached percutaneously to the iliac crest.  [employer] array was placed in appropriate position.  Standard direct superior approach to the hip was performed centered on the posterior 3rd of the greater trochanter.  Taken down through the skin and subcutaneous tissue down the fascia.  Fascia was split in line with the fibers.  Charnley retractor was placed.  Check point placed in the greater trochanter.  Piriformis tendon was identified.  Arthrotomy was made on the superior aspect of the piriformis and taken distally through the superior aspect of the short external rotators in a hockey-stick style fashion.  Hip was internally rotated and dislocated.  Sagittal saw was used to make an osteotomy 1-2 fingerbreadths proximal to the level of lesser trochanter.  The retractors placed around the acetabulum and excellent exposure was had.  Removed the labrum as well pulvinar.  Registered the acetabulum using OurStay protocol with excellent registration.  Brought in the [employer] robot and reamed to a 46 Reamer.  We then impacted a 46 Trident II acetabulum component into position found to be down.   Acetabulum was impacted with 40° of inclination and 20° of anteversion.   We then impacted the liner into position and found it to be down.    Attention then turned to the femur.  Hip was internally rotated exposing the femur.  Retractors were placed.  Cookie cutter used to take out the lateral femoral neck.  Canal finder to open up the femoral canal.  We then broached sequentially up to a size 5 broach.  This came to a firm and solid stop with a change in pitch.  We then trialed at this point using a trial femoral neck and head.  Hip was taken through full range of motion and found to be stable with no anterior or posterior instability and good recreation of leg lengths as well as offset.  This was confirmed using measurements from the [employer] robot.  We then dislocated the hip confirm stability of femoral broach.  We then opened the implant impacted the a 5 femoral component position.  It came down to the previous level of resection with again a firm solid stop. 32-4 Biolox head was opened and impacted onto the femoral trunnion after cleaning and drying the trunnion.  Hip was reduced and again taken to full range of motion.  Excellent recreation of leg length was felt and no anterior or posterior instability found.    Within turned our attention toward closure.  Wound was copiously irrigated with normal saline followed by Betadine lavage and more normal saline.  We closed the piriformis tendon as well as the short external rotators back to the greater trochanter through drill tunnels using #2 FiberWire sutures.  We injected our joint cocktail.  Fascia closed with #1 Stratafix suture in running fashion.  2-0 Monocryl on the subcutaneous tissue followed by skin closure.    All sponge and needle counts were correct at the end of the case.  I was present and participated in all key and critical aspects of the procedure.    Prognosis:  Patient arose from anesthesia without any issues.  Was transferred to recovery room stable condition.  Postoperatively weight-bearing as tolerated to the left  lower extremity with appropriate hip precautions.  Plan to discharge home versus rehab pending physical therapy evaluation.    This note/OR report was created with the assistance of  voice recognition software or phone  dictation.  There may be transcription errors as a result of using this technology however minimal. Effort has been made to assure accuracy of transcription but any obvious errors or omissions should be clarified with the author of the document.

## 2023-11-27 NOTE — PLAN OF CARE
Problem: Adult Inpatient Plan of Care  Goal: Plan of Care Review  Outcome: Ongoing, Progressing  Goal: Patient-Specific Goal (Individualized)  Outcome: Ongoing, Progressing  Goal: Absence of Hospital-Acquired Illness or Injury  Outcome: Ongoing, Progressing  Goal: Optimal Comfort and Wellbeing  Outcome: Ongoing, Progressing  Goal: Readiness for Transition of Care  Outcome: Ongoing, Progressing     Problem: Fall Injury Risk  Goal: Absence of Fall and Fall-Related Injury  Outcome: Ongoing, Progressing     Problem: Skin Injury Risk Increased  Goal: Skin Health and Integrity  Outcome: Ongoing, Progressing     Problem: Infection  Goal: Absence of Infection Signs and Symptoms  Outcome: Ongoing, Progressing     Problem: Bleeding (Orthopaedic Fracture)  Goal: Absence of Bleeding  Outcome: Ongoing, Progressing     Problem: Embolism (Orthopaedic Fracture)  Goal: Absence of Embolism Signs and Symptoms  Outcome: Ongoing, Progressing

## 2023-11-27 NOTE — PROGRESS NOTES
Hospital Medicine  Progress Note    Patient Name: Nel Recinos  MRN: [id number]  Status: IP- Inpatient   Admission Date: 11/25/2023  Length of Stay: 2  Date of Service: 11/27/2023       CC: hospital follow-up for hip fracture       SUBJECTIVE   73 year old female with a history that includes A fib on Eliquis, presented to the ED 11/25 after a trip and fall, landing on her left hip.  Patient notes home being renovated at present and she tripped over the visqueen on the floor. Denied hitting her head or LOC.   Evaluation in ED included x-ray noting subcapital left femoral neck fracture.   Orthopedics was consulted and patient was admitted to Hospital Medicine services.    Today: waiting for surgery today, no f/c/cp/sob.  Hip pain controlled      MEDICATIONS   Scheduled   atorvastatin  20 mg Oral QHS    pantoprazole  40 mg Oral QAM    sertraline  50 mg Oral Daily    sotaloL  120 mg Oral BID     Continuous Infusions   lactated ringers 75 mL/hr at 11/26/23 1459       PHYSICAL EXAM   VITALS: T 98.2 °F (36.8 °C)   /81   P 63   RR 18   O2 96 %    GENERAL: Awake and in NAD  LUNGS: CTA B/L  CVS: Normal rate  GI/: Soft, NT, bowel sounds positive.  EXTREMITIES: LLE shorted, externally rotated, PP intact.  NEURO: AAOx3  PSYCH: Cooperative      LABS   CBC  Recent Labs     11/25/23  1608 11/26/23  0357   WBC 8.35 9.93   RBC 3.95* 3.76*   HGB 12.0 11.3*   HCT 35.0* 34.0*   MCV 88.6 90.4   MCH 30.4 30.1   MCHC 34.3 33.2   RDW 12.7 12.7    198     CHEM  Recent Labs     11/25/23  1608 11/26/23  0357    140   K 4.1 4.1   CHLORIDE 107 109*   CO2 24 23   BUN 15.9 13.1   CREATININE 0.70 0.72   GLUCOSE 99 134*   CALCIUM 9.2 9.0   MG  --  1.80   PHOS  --  3.5   ALBUMIN 4.1 3.7   GLOBULIN 2.3* 2.4   ALKPHOS 46 39*   ALT 15 14   AST 19 18   BILITOT 0.9 0.9         ASSESSMENT   Left IT femur fracture (closed/displaced)  Afib on Eliquis (last dose 11/24 PM)    PLAN     Refer to ortho recs planning surgery today  Pain  control        Prophylaxis: holding AC pending surgical fixation        Michael Dennison MD  [facility] Medicine

## 2023-11-27 NOTE — TRANSFER OF CARE
Anesthesia Transfer of Care Note    Patient: Nel Recinos    Procedure(s) Performed: Procedure(s) (LRB):  ROBOTIC ARTHROPLASTY, HIP, TOTAL, POSTERIOR APPROACH (Left)    Patient location: PACU    Anesthesia Type: general    Transport from OR: Transported from OR on room air with adequate spontaneous ventilation    Post pain: adequate analgesia    Post assessment: no apparent anesthetic complications and tolerated procedure well    Post vital signs: stable    Level of consciousness: responds to stimulation and sedated    Nausea/Vomiting: no nausea/vomiting    Complications: none    Transfer of care protocol was followed

## 2023-11-27 NOTE — ANESTHESIA PROCEDURE NOTES
Intubation    Date/Time: 11/27/2023 3:13 PM    Performed by: Tremaine Haro CRNA  Authorized by: Wilver Shepard MD    Intubation:     Induction:  Intravenous    Intubated:  Postinduction    Mask Ventilation:  Easy mask    Attempts:  1    Attempted By:  CRNA    Method of Intubation:  Direct    Blade:  Espitia 2    Laryngeal View Grade: Grade IIA - cords partially seen      Difficult Airway Encountered?: No      Complications:  None    Airway Device:  Oral endotracheal tube    Airway Device Size:  7.0    Style/Cuff Inflation:  Cuffed (inflated to minimal occlusive pressure)    Tube secured:  22    Secured at:  The lips    Placement Verified By:  Capnometry    Complicating Factors:  None    Findings Post-Intubation:  BS equal bilateral and atraumatic/condition of teeth unchanged

## 2023-11-27 NOTE — ANESTHESIA PREPROCEDURE EVALUATION
11/27/2023  Nel Recinos is a 73 y.o., female.    Pre-op Diagnosis: Closed displaced fracture of neck of left femur [S72.002A]  Femoral neck fracture [S72.009A]    Procedure(s): ROBOTIC ARTHROPLASTY, HIP, TOTAL, POSTERIOR APPROACH     Review of patient's allergies indicates:   Allergen Reactions    Tomato     Weed pollen        Current Outpatient Medications   Medication Instructions    ELIQUIS 5 mg, Oral, 2 times daily    meloxicam (MOBIC) 15 mg, Oral    pantoprazole (PROTONIX) 40 MG tablet 1 tablet, Oral, Every morning    rosuvastatin (CRESTOR) 20 mg, Oral, Nightly    sertraline (ZOLOFT) 50 mg, Oral    sotaloL (BETAPACE) 120 mg, Oral, 2 times daily     LAST DOSE OF ELIQUIS    ASSESSMENT   Left IT femur fracture (closed/displaced)  Afib on Eliquis (last dose 11/24 PM)  HISTORY OF PRESENT ILLNESS:   Ms. Recinos is a 73 year old female with a pmh of A fib on Eliquis, Asthma, HLD, Anxiety, arthritis, GERD, LUE Lipoma who presented to the ED after a trip and fall, landing on her left hip.    PAST MEDICAL HISTORY:   Atrial fibrillation on Eliquis  Asthma  Hyperlipidemia  Anxiety  Arthritis  GERD  LUE Lipoma     PAST SURGICAL HISTORY:   Right thumb joint replacement   Left carpal tunnel release  Appendectomy   Bilateral tubal ligation   Bilateral foot surgery  Pre-op Assessment    I have reviewed the Patient Summary Reports.    I have reviewed the NPO Status.   I have reviewed the Medications.     Review of Systems  Anesthesia Hx:  No problems with previous Anesthesia             Denies Family Hx of Anesthesia complications.   Personal Hx of Anesthesia complications          Slow To Awaken/Delayed Emergence (ONCE)          Social:  Non-Smoker       Cardiovascular:  Exercise tolerance: good       Dysrhythmias atrial fibrillation  Denies Angina.   Denies Orthopnea.  Denies PND.  hyperlipidemia  Denies ORTEGA.     Functional Capacity good / => 4 METS                         Pulmonary:    Asthma                    Hepatic/GI:     GERD             Musculoskeletal:  Musculoskeletal Normal                Neurological:  Denies TIA.  Denies CVA.                                    Psych:   anxiety               Recent Labs   Lab 11/25/23  1608 11/26/23  0357   WBC 8.35 9.93   RBC 3.95* 3.76*   HGB 12.0 11.3*   HCT 35.0* 34.0*   MCV 88.6 90.4   MCH 30.4 30.1   MCHC 34.3 33.2   RDW 12.7 12.7    198   MPV 10.2 9.9       Recent Labs   Lab 11/25/23  1608 11/26/23  0357    140   K 4.1 4.1   CO2 24 23   BUN 15.9 13.1   CREATININE 0.70 0.72   CALCIUM 9.2 9.0   MG  --  1.80   ALBUMIN 4.1 3.7   ALKPHOS 46 39*   ALT 15 14   AST 19 18   BILITOT 0.9 0.9     CXR 11/25/2023 NACPD    Recent Labs   Lab 11/26/23 0357   PTT 24.3   INR 1.1        Physical Exam  General: Well nourished, Alert and Oriented    Airway:  Mallampati: III   Mouth Opening: Normal  TM Distance: Normal  Tongue: Normal  Neck ROM: Normal ROM    Dental:  Intact    Chest/Lungs:  Clear to auscultation    Heart:  Rate: Normal  Rhythm: Regular Rhythm  No pretibial edema  No carotid bruits      Anesthesia Plan  Type of Anesthesia, risks & benefits discussed:    Anesthesia Type: Gen ETT  Intra-op Monitoring Plan: Standard ASA Monitors  Post Op Pain Control Plan: multimodal analgesia  Induction:  IV  Airway Plan: Direct, Post-Induction  Informed Consent: Informed consent signed with the Patient and all parties understand the risks and agree with anesthesia plan.  All questions answered. Patient consented to blood products? Yes  ASA Score: 3  Day of Surgery Review of History & Physical: H&P Update referred to the surgeon/provider.    Ready For Surgery From Anesthesia Perspective.     .

## 2023-11-28 LAB
ANION GAP SERPL CALC-SCNC: 7 MEQ/L
BUN SERPL-MCNC: 14.6 MG/DL (ref 9.8–20.1)
CALCIUM SERPL-MCNC: 8.6 MG/DL (ref 8.4–10.2)
CHLORIDE SERPL-SCNC: 105 MMOL/L (ref 98–107)
CO2 SERPL-SCNC: 27 MMOL/L (ref 23–31)
CREAT SERPL-MCNC: 0.73 MG/DL (ref 0.55–1.02)
CREAT/UREA NIT SERPL: 20
ERYTHROCYTE [DISTWIDTH] IN BLOOD BY AUTOMATED COUNT: 13 % (ref 11.5–17)
GFR SERPLBLD CREATININE-BSD FMLA CKD-EPI: >60 MLS/MIN/1.73/M2
GLUCOSE SERPL-MCNC: 103 MG/DL (ref 82–115)
HCT VFR BLD AUTO: 28.3 % (ref 37–47)
HGB BLD-MCNC: 9.4 G/DL (ref 12–16)
MCH RBC QN AUTO: 30.5 PG (ref 27–31)
MCHC RBC AUTO-ENTMCNC: 33.2 G/DL (ref 33–36)
MCV RBC AUTO: 91.9 FL (ref 80–94)
NRBC BLD AUTO-RTO: 0 %
PLATELET # BLD AUTO: 140 X10(3)/MCL (ref 130–400)
PMV BLD AUTO: 9.8 FL (ref 7.4–10.4)
POTASSIUM SERPL-SCNC: 3.6 MMOL/L (ref 3.5–5.1)
RBC # BLD AUTO: 3.08 X10(6)/MCL (ref 4.2–5.4)
SODIUM SERPL-SCNC: 139 MMOL/L (ref 136–145)
WBC # SPEC AUTO: 7.88 X10(3)/MCL (ref 4.5–11.5)

## 2023-11-28 PROCEDURE — 97530 THERAPEUTIC ACTIVITIES: CPT | Mod: CQ

## 2023-11-28 PROCEDURE — 94761 N-INVAS EAR/PLS OXIMETRY MLT: CPT

## 2023-11-28 PROCEDURE — 27000221 HC OXYGEN, UP TO 24 HOURS

## 2023-11-28 PROCEDURE — 25000003 PHARM REV CODE 250: Performed by: ORTHOPAEDIC SURGERY

## 2023-11-28 PROCEDURE — 85027 COMPLETE CBC AUTOMATED: CPT | Performed by: ORTHOPAEDIC SURGERY

## 2023-11-28 PROCEDURE — 97535 SELF CARE MNGMENT TRAINING: CPT

## 2023-11-28 PROCEDURE — 11000001 HC ACUTE MED/SURG PRIVATE ROOM

## 2023-11-28 PROCEDURE — 80048 BASIC METABOLIC PNL TOTAL CA: CPT | Performed by: ORTHOPAEDIC SURGERY

## 2023-11-28 PROCEDURE — 25000003 PHARM REV CODE 250: Performed by: INTERNAL MEDICINE

## 2023-11-28 PROCEDURE — 99900031 HC PATIENT EDUCATION (STAT)

## 2023-11-28 PROCEDURE — 97116 GAIT TRAINING THERAPY: CPT | Mod: CQ

## 2023-11-28 PROCEDURE — 63600175 PHARM REV CODE 636 W HCPCS: Performed by: ORTHOPAEDIC SURGERY

## 2023-11-28 PROCEDURE — 94799 UNLISTED PULMONARY SVC/PX: CPT | Mod: XB

## 2023-11-28 PROCEDURE — 97162 PT EVAL MOD COMPLEX 30 MIN: CPT

## 2023-11-28 PROCEDURE — 97166 OT EVAL MOD COMPLEX 45 MIN: CPT

## 2023-11-28 RX ADMIN — ATORVASTATIN CALCIUM 20 MG: 10 TABLET, FILM COATED ORAL at 09:11

## 2023-11-28 RX ADMIN — PANTOPRAZOLE SODIUM 40 MG: 40 TABLET, DELAYED RELEASE ORAL at 06:11

## 2023-11-28 RX ADMIN — ACETAMINOPHEN 500 MG: 500 TABLET ORAL at 04:11

## 2023-11-28 RX ADMIN — SOTALOL HYDROCHLORIDE 120 MG: 120 TABLET ORAL at 09:11

## 2023-11-28 RX ADMIN — ACETAMINOPHEN 500 MG: 500 TABLET ORAL at 09:11

## 2023-11-28 RX ADMIN — SOTALOL HYDROCHLORIDE 120 MG: 120 TABLET ORAL at 08:11

## 2023-11-28 RX ADMIN — TRAMADOL HYDROCHLORIDE 50 MG: 50 TABLET, COATED ORAL at 05:11

## 2023-11-28 RX ADMIN — POLYETHYLENE GLYCOL 3350 17 G: 17 POWDER, FOR SOLUTION ORAL at 09:11

## 2023-11-28 RX ADMIN — METOCLOPRAMIDE HYDROCHLORIDE 10 MG: 5 INJECTION INTRAMUSCULAR; INTRAVENOUS at 05:11

## 2023-11-28 RX ADMIN — CEFAZOLIN 2 G: 2 INJECTION, POWDER, FOR SOLUTION INTRAMUSCULAR; INTRAVENOUS at 08:11

## 2023-11-28 RX ADMIN — FAMOTIDINE 20 MG: 20 TABLET ORAL at 08:11

## 2023-11-28 RX ADMIN — POLYETHYLENE GLYCOL 3350 17 G: 17 POWDER, FOR SOLUTION ORAL at 08:11

## 2023-11-28 RX ADMIN — KETOROLAC TROMETHAMINE 15 MG: 30 INJECTION, SOLUTION INTRAMUSCULAR at 12:11

## 2023-11-28 RX ADMIN — KETOROLAC TROMETHAMINE 15 MG: 30 INJECTION, SOLUTION INTRAMUSCULAR at 05:11

## 2023-11-28 RX ADMIN — FAMOTIDINE 20 MG: 20 TABLET ORAL at 09:11

## 2023-11-28 RX ADMIN — SENNOSIDES AND DOCUSATE SODIUM 2 TABLET: 8.6; 5 TABLET ORAL at 09:11

## 2023-11-28 RX ADMIN — ACETAMINOPHEN 500 MG: 500 TABLET ORAL at 08:11

## 2023-11-28 RX ADMIN — CEFAZOLIN 2 G: 2 INJECTION, POWDER, FOR SOLUTION INTRAMUSCULAR; INTRAVENOUS at 02:11

## 2023-11-28 RX ADMIN — METOCLOPRAMIDE HYDROCHLORIDE 10 MG: 5 INJECTION INTRAMUSCULAR; INTRAVENOUS at 12:11

## 2023-11-28 RX ADMIN — SERTRALINE HYDROCHLORIDE 50 MG: 50 TABLET ORAL at 08:11

## 2023-11-28 RX ADMIN — ACETAMINOPHEN 500 MG: 500 TABLET ORAL at 12:11

## 2023-11-28 NOTE — PT/OT/SLP EVAL
"Occupational Therapy   Evaluation    Name: Nel Recinos  MRN: [id number]  Admitting Diagnosis: Closed displaced fracture of neck of left femur  Recent Surgery: Procedure(s) (LRB):  ROBOTIC ARTHROPLASTY, HIP, TOTAL, POSTERIOR APPROACH (Left) 1 Day Post-Op    Recommendations:     Discharge Recommendations: High Intensity Therapy (High Intensity Therapy vs. Low Intensity Therapy pending patient's progress)  Discharge Equipment Recommendations:  bedside commode, hip kit, walker, rolling (TTB)  Barriers to discharge:   (Patient's level of function at this time and oxygen need.)    Assessment:     Nel Recinos is a 73 y.o. female with a medical diagnosis of Closed displaced fracture of neck of left femur s/p L OLENA.  She presents with the following performance deficits affecting function: weakness, impaired endurance, impaired self care skills, impaired functional mobility, gait instability, impaired balance, decreased lower extremity function, pain, orthopedic precautions.      Rehab Prognosis: Good; patient would benefit from acute skilled OT services to address these deficits and reach maximum level of function.       Plan:     Patient to be seen  (QD-BID) to address the above listed problems via self-care/home management, therapeutic activities, therapeutic exercises  Plan of Care Expires: 12/05/23  Plan of Care Reviewed with: patient, friend    Subjective     Patient/Family Comments/goals: "To get back to my normal."     Occupational Profile:  Living Environment: Patient reported to live in single level home with threshold to enter with her partner/friend with standard toilet with no grab bars and tub/shower combination. Patient also reported that her partner/friend has a walk-in shower with grab bars and shower seat that she can use.   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   Equipment Used at Home: none Patient " reported that she does have rollator.   Assistance upon Discharge: Patient will have assistance from her partner/friend who is retired.     Pain/Comfort:  Pain Rating 1: 4/10  Location - Side 1: Left  Location 1: hip  Pain Addressed 1: Reposition, Nurse notified    Patients cultural, spiritual, Zoroastrian conflicts given the current situation: no    Objective:     Communicated with: Nsimer prior to session.  Patient found up in chair with arenas catheter, oxygen, peripheral IV, telemetry upon OT entry to room.    General Precautions: Standard, fall  Orthopedic Precautions: LLE weight bearing as tolerated, LLE posterior precautions - Patient was educated on all precautions at beginning of session. Patient maintained precautions well with min verbal cues required for adherence.   Braces: N/A  Respiratory Status: Nasal cannula, flow 2 L/min    O2 @ rest - 94%  O2 after ambulation to bathroom and performance of toilet transfer - 90% (improved to 94% with seated rest break and performance of pursed lip breathing)  O2 @ end of session - 95-97%    Occupational Performance:      Functional Mobility/Transfers:  Patient completed Sit <> Stand Transfer with minimum assistance  with  rolling walker   Patient completed Toilet Transfer Step Transfer technique with minimum assistance with  rolling walker and BSC over toilet   Functional Mobility: Patient performed functional ambulation with RW to<>from bathroom with slow pace with CGA.     Activities of Daily Living:  Lower Body Dressing: Patient provided with and educated on LE dressing AE. Plan for patient to perform with OT in PM treatment session.     Toileting: minimum assistance -void (Patient with catheter.)    Cognitive/Visual Perceptual:  Cognitive/Psychosocial Skills:     -       Oriented to: Person, Place, Time, and Situation   -       Follows Commands/attention:Follows multistep  commands  -       Safety awareness/insight to disability: intact   Visual/Perceptual:       -Intact wears contacts     Physical Exam:  Dominant hand:    -       L Hand  Upper Extremity Range of Motion:     -       Right Upper Extremity: WFL  -       Left Upper Extremity: WFL  Upper Extremity Strength:    -       Right Upper Extremity: WFL  -       Left Upper Extremity: WFL  Fine Motor Coordination:    -       Intact      Patient left up in chair with all lines intact, call button in reach, partner/friend present, and O2 donned.     GOALS:   Multidisciplinary Problems       Occupational Therapy Goals          Problem: Occupational Therapy    Goal Priority Disciplines Outcome Interventions   Occupational Therapy Goal     OT, PT/OT Ongoing, Progressing    Description: Pt will perform LB dressing Mod I by d/c.  Pt will perform toileting Mod I by d/c.  Pt will perform toilet t/f Mod I by d/c.  Pt will perform tub t/f Mod I assist c TTB by d/c.  Pt will perform shower t/f Mod I by d/c.  Pt will perform car t/f SBA in adherence to pxns by d/c.                        History:     History reviewed. No pertinent past medical history.      Past Surgical History:   Procedure Laterality Date    ROBOTIC ARTHROPLASTY,HIP,TOTAL,POSTERIOR APPROACH Left 11/27/2023    Procedure: ROBOTIC ARTHROPLASTY, HIP, TOTAL, POSTERIOR APPROACH;  Surgeon: Isidoro Waters MD;  Location: [facility];  Service: Orthopedics;  Laterality: Left;       Time Tracking:     OT Date of Treatment: 11/28/23  OT Start Time: 1110  OT Stop Time: 1140  OT Total Time (min): 30 min    Billable Minutes:Evaluation 30    11/28/2023

## 2023-11-28 NOTE — PLAN OF CARE
11/28/23 1553   Discharge Assessment   Assessment Type Discharge Planning Assessment   Source of Information patient   Communicated SAYDA with patient/caregiver Yes   Reason For Admission s/p THR   People in Home significant other   Do you expect to return to your current living situation? Yes   Do you have help at home or someone to help you manage your care at home? Yes   Who are your caregiver(s) and their phone number(s)?  - Slick [phone]   Prior to hospitilization cognitive status: Alert/Oriented   Current cognitive status: Alert/Oriented   Walking or Climbing Stairs ambulation difficulty, requires equipment   Dressing/Bathing bathing difficulty, requires equipment;dressing difficulty, requires equipment   Equipment Currently Used at Home rollator   Readmission within 30 days? No   Patient currently being followed by outpatient case management? No   Do you currently have service(s) that help you manage your care at home? No   Do you take prescription medications? Yes   Do you have prescription coverage? Yes   Do you have any problems affording any of your prescribed medications? No   Is the patient taking medications as prescribed? yes   Who is going to help you get home at discharge? s/o   How do you get to doctors appointments? car, drives self   Are you on dialysis? No   Do you take coumadin? No   DME Needed Upon Discharge  walker, rolling;bedside commode   Discharge Plan discussed with: Patient   Transition of Care Barriers None   Discharge Plan A Rehab   Discharge Plan B Home Health;Home with family     Admitted w fem fx r/t fall. S/p THR. Spk w pt -- std she lives with significant other, Slick. Slick plans to asst w homecare. Pt has rolator, but needs RW & bsc at discharge. Discuss dcp options. Pt agreed to inpt rehab eval due to pt's O2 sats dropping during ambulation. Pt denies hx smoking & Covid. Foc obtained.     Called referral to [facility] rehab. Info faxed. Await auth.     Contact # Slick  [phone].  Pcp: Dr. Priyank Eubanks.

## 2023-11-28 NOTE — PROGRESS NOTES
Hospital Medicine  Progress Note    Patient Name: Nel Recinos  MRN: [id number]  Status: IP- Inpatient   Admission Date: 11/25/2023  Length of Stay: 3  Date of Service: 11/28/2023       CC: hospital follow-up for hip fracture       SUBJECTIVE   73 year old female with a history that includes A fib on Eliquis, presented to the ED 11/25 after a trip and fall, landing on her left hip.  Patient notes home being renovated at present and she tripped over the visqueen on the floor. Denied hitting her head or LOC.   Evaluation in ED included x-ray noting subcapital left femoral neck fracture.   Orthopedics was consulted and patient was admitted to Hospital Medicine services.    Today: surgery late yesterday, waiting for therapy this am, nervous about pain to hip and therapy.      MEDICATIONS   Scheduled   acetaminophen  500 mg Oral 6 times per day    atorvastatin  20 mg Oral QHS    [START ON 11/30/2023] bisacodyL  10 mg Rectal Daily    famotidine  20 mg Oral BID    ketorolac  15 mg Intravenous Q6H    metoclopramide HCl  10 mg Intravenous Q6H    pantoprazole  40 mg Oral QAM    polyethylene glycol  17 g Oral BID    senna-docusate 8.6-50 mg  2 tablet Oral QHS    sertraline  50 mg Oral Daily    sotaloL  120 mg Oral BID     Continuous Infusions   sodium chloride 0.9%      lactated ringers 75 mL/hr at 11/26/23 1459       PHYSICAL EXAM   VITALS: T 98.6 °F (37 °C)   BP (!) 93/54   P 63   RR 18   O2 (!) 93 %    GENERAL: Awake and in NAD  LUNGS: CTA B/L  CVS: Normal rate  GI/: Soft, NT, bowel sounds positive.  EXTREMITIES:  left hip surgery  NEURO: AAOx3  PSYCH: Cooperative      LABS   CBC  Recent Labs     11/26/23  0357 11/27/23  1706 11/28/23  0519   WBC 9.93  --  7.88   RBC 3.76*  --  3.08*   HGB 11.3* 10.2* 9.4*   HCT 34.0* 30.9* 28.3*   MCV 90.4  --  91.9   MCH 30.1  --  30.5   MCHC 33.2  --  33.2   RDW 12.7  --  13.0     --  140     CHEM  Recent Labs     11/25/23  1608 11/26/23  0357 11/28/23  0519    140 139    K 4.1 4.1 3.6   CHLORIDE 107 109* 105   CO2 24 23 27   BUN 15.9 13.1 14.6   CREATININE 0.70 0.72 0.73   GLUCOSE 99 134* 103   CALCIUM 9.2 9.0 8.6   MG  --  1.80  --    PHOS  --  3.5  --    ALBUMIN 4.1 3.7  --    GLOBULIN 2.3* 2.4  --    ALKPHOS 46 39*  --    ALT 15 14  --    AST 19 18  --    BILITOT 0.9 0.9  --          ASSESSMENT   Left IT femur fracture (closed/displaced)/left Fer total hip arthroplasty 11/27  Afib on Eliquis (last dose 11/24 PM)    PLAN     Refer to ortho recs    Pain control  PTOT  Resume eliquis per ortho recs      Prophylaxis: holding AC , scd      Michael Dennison MD  [facility] Medicine

## 2023-11-28 NOTE — PLAN OF CARE
Problem: Physical Therapy  Goal: Physical Therapy Goal  Description: Pt will improve functional independence by performing:    Bed mobility: SBA  Sit to stand: SBA with rolling walker  Bed to chair t/f: SBA with Stand Step  with rolling walker  Ambulation x 200'  with SBA with rolling walker  1 Step (Curb): Min A  with rolling walker  3 Steps: Min A  with B HR  Independent with total hip HEP   Outcome: Ongoing, Progressing

## 2023-11-28 NOTE — PLAN OF CARE
Problem: Occupational Therapy  Goal: Occupational Therapy Goal  Description: Pt will perform LB dressing Mod I by d/c.  Pt will perform toileting Mod I by d/c.  Pt will perform toilet t/f Mod I by d/c.  Pt will perform tub t/f Mod I assist c TTB by d/c.  Pt will perform shower t/f Mod I by d/c.  Pt will perform car t/f SBA in adherence to pxns by d/c.   Outcome: Ongoing, Progressing

## 2023-11-28 NOTE — PROGRESS NOTES
"No acute events overnight.  Pain controlled.  Resting in bed.     Vital Signs  Temp: 98.4 °F (36.9 °C)  Temp Source: Oral  Pulse: 64  Resp: 18  SpO2: 95 %  Pulse Oximetry Type: Continuous  Flow (L/min): 3  Device (Oxygen Therapy): nasal cannula  BP: 104/65  BP Location: Right arm  Height and Weight  Height: 5' 4" (162.6 cm)  Weight: 68 kg (150 lb)  Weight Method: Standard Scale  Dosing Weight: 68 kg (150 lb)  BSA (Calculated - sq m): 1.75 sq meters  BMI (Calculated): 25.7  Weight in (lb) to have BMI = 25: 145.3]    +FHL/EHL  BCR distally  Dressing c/d/i  SILT distally    Recent Lab Results         11/28/23  0519   11/27/23  1706        Anion Gap 7.0         BUN 14.6         BUN/CREAT RATIO 20         Calcium 8.6         Chloride 105         CO2 27         Creatinine 0.73         eGFR >60         Glucose 103         Hematocrit 28.3   30.9       Hemoglobin 9.4   10.2       MCH 30.5         MCHC 33.2         MCV 91.9         MPV 9.8         nRBC 0.0         Platelet Count 140         Potassium 3.6         RBC 3.08         RDW 13.0         Sodium 139         WBC 7.88                 A/P:  Status post OLENA for femoral neck fx  Pain controlled  Overall patient doing well.  Therapy for mobility and ambulation.  ASA for DVT PPx  Therapy for mobility - home vs placement pending PT eval  "

## 2023-11-28 NOTE — PLAN OF CARE
Problem: Adult Inpatient Plan of Care  Goal: Plan of Care Review  Outcome: Ongoing, Progressing  Flowsheets (Taken 11/28/2023 1156)  Plan of Care Reviewed With: patient  Goal: Absence of Hospital-Acquired Illness or Injury  Outcome: Ongoing, Progressing  Intervention: Identify and Manage Fall Risk  Flowsheets (Taken 11/28/2023 1156)  Safety Promotion/Fall Prevention:   assistive device/personal item within reach   Fall Risk reviewed with patient/family   diversional activities provided   high risk medications identified   medications reviewed   nonskid shoes/socks when out of bed   side rails raised x 2   instructed to call staff for mobility  Intervention: Prevent Skin Injury  Flowsheets (Taken 11/28/2023 1156)  Skin Protection:   adhesive use limited   incontinence pads utilized   tubing/devices free from skin contact   transparent dressing maintained  Intervention: Prevent and Manage VTE (Venous Thromboembolism) Risk  Flowsheets (Taken 11/28/2023 1156)  VTE Prevention/Management:   ambulation promoted   bleeding risk assessed   bleeding precations maintained   bleeding risk factor(s) identified, provider notified   remove, assess skin, and reapply compression stockings   fluids promoted   dorsiflexion/plantar flexion performed  Range of Motion: active ROM (range of motion) encouraged  Intervention: Prevent Infection  Flowsheets (Taken 11/28/2023 1156)  Infection Prevention:   environmental surveillance performed   equipment surfaces disinfected   hand hygiene promoted   personal protective equipment utilized   rest/sleep promoted   single patient room provided  Goal: Optimal Comfort and Wellbeing  Outcome: Ongoing, Progressing  Intervention: Monitor Pain and Promote Comfort  Flowsheets (Taken 11/28/2023 1156)  Pain Management Interventions:   relaxation techniques promoted   quiet environment facilitated   prescribed exercises encouraged   medication offered but refused   pain management plan reviewed with  patient/caregiver   pillow support provided  Intervention: Provide Person-Centered Care  Flowsheets (Taken 11/28/2023 1156)  Trust Relationship/Rapport:   care explained   choices provided   emotional support provided   empathic listening provided   questions encouraged   questions answered   reassurance provided   thoughts/feelings acknowledged     Problem: Fall Injury Risk  Goal: Absence of Fall and Fall-Related Injury  Outcome: Ongoing, Progressing  Intervention: Identify and Manage Contributors  Flowsheets (Taken 11/28/2023 1156)  Self-Care Promotion:   independence encouraged   BADL personal objects within reach   BADL personal routines maintained   safe use of adaptive equipment encouraged  Medication Review/Management:   medications reviewed   high-risk medications identified  Intervention: Promote Injury-Free Environment  Flowsheets (Taken 11/28/2023 1156)  Safety Promotion/Fall Prevention:   assistive device/personal item within reach   Fall Risk reviewed with patient/family   diversional activities provided   high risk medications identified   medications reviewed   nonskid shoes/socks when out of bed   side rails raised x 2   instructed to call staff for mobility     Problem: Skin Injury Risk Increased  Goal: Skin Health and Integrity  Outcome: Ongoing, Progressing  Intervention: Optimize Skin Protection  Flowsheets (Taken 11/28/2023 1156)  Pressure Reduction Techniques:   frequent weight shift encouraged   heels elevated off bed   positioned off wounds   pressure points protected   rest period provided between sit times   weight shift assistance provided  Pressure Reduction Devices: heel offloading device utilized  Skin Protection:   adhesive use limited   incontinence pads utilized   tubing/devices free from skin contact   transparent dressing maintained  Intervention: Promote and Optimize Oral Intake  Flowsheets (Taken 11/28/2023 1156)  Oral Nutrition Promotion:   physical activity promoted   rest  periods promoted     Problem: Infection  Goal: Absence of Infection Signs and Symptoms  Outcome: Ongoing, Progressing  Intervention: Prevent or Manage Infection  Flowsheets (Taken 11/28/2023 1156)  Infection Management: aseptic technique maintained  Isolation Precautions: protective     Problem: Bleeding (Orthopaedic Fracture)  Goal: Absence of Bleeding  Outcome: Ongoing, Progressing  Intervention: Monitor and Manage Fracture Bleeding  Flowsheets (Taken 11/28/2023 1156)  Bleeding Management: dressing monitored     Problem: Embolism (Orthopaedic Fracture)  Goal: Absence of Embolism Signs and Symptoms  Outcome: Ongoing, Progressing  Intervention: Prevent or Manage Embolism Risk  Flowsheets (Taken 11/28/2023 1156)  VTE Prevention/Management:   ambulation promoted   bleeding risk assessed   bleeding precations maintained   bleeding risk factor(s) identified, provider notified   remove, assess skin, and reapply compression stockings   fluids promoted   dorsiflexion/plantar flexion performed     Problem: Functional Ability Impaired (Orthopaedic Fracture)  Goal: Optimal Functional Ability  Outcome: Ongoing, Progressing  Intervention: Optimize Functional Ability  Flowsheets (Taken 11/28/2023 1156)  Self-Care Promotion:   independence encouraged   BADL personal objects within reach   BADL personal routines maintained   safe use of adaptive equipment encouraged  Activity Assistance Provided: assistance, 1 person  Range of Motion: active ROM (range of motion) encouraged     Problem: Infection (Orthopaedic Fracture)  Goal: Absence of Infection Signs and Symptoms  Outcome: Ongoing, Progressing  Intervention: Prevent or Manage Infection  Flowsheets (Taken 11/28/2023 1156)  Infection Prevention:   environmental surveillance performed   equipment surfaces disinfected   hand hygiene promoted   personal protective equipment utilized   rest/sleep promoted   single patient room provided  Infection Management: aseptic technique  maintained     Problem: Neurovascular Compromise (Orthopaedic Fracture)  Goal: Effective Tissue Perfusion  Outcome: Ongoing, Progressing  Intervention: Prevent or Manage Neurovascular Compromise  Flowsheets (Taken 11/28/2023 1156)  Compartment Syndrome Management: active flexion/extension encouraged     Problem: Pain (Orthopaedic Fracture)  Goal: Acceptable Pain Control  Outcome: Ongoing, Progressing  Intervention: Manage Acute Orthopaedic-Related Pain  Flowsheets (Taken 11/28/2023 1156)  Diversional Activities: television  Pain Management Interventions:   relaxation techniques promoted   quiet environment facilitated   prescribed exercises encouraged   medication offered but refused   pain management plan reviewed with patient/caregiver   pillow support provided     Problem: Respiratory Compromise (Orthopaedic Fracture)  Goal: Effective Oxygenation and Ventilation  Outcome: Ongoing, Progressing  Intervention: Promote Airway Secretion Clearance  Flowsheets (Taken 11/28/2023 1156)  Breathing Techniques/Airway Clearance: diaphragmatic breathing promoted  Cough And Deep Breathing: done independently per patient  Intervention: Optimize Oxygenation and Ventilation  Flowsheets (Taken 11/28/2023 1156)  Fluid/Electrolyte Management: fluids provided     Problem: Asthma Comorbidity  Goal: Maintenance of Asthma Control  Outcome: Ongoing, Progressing  Intervention: Maintain Asthma Symptom Control  Flowsheets (Taken 11/28/2023 1156)  Medication Review/Management:   medications reviewed   high-risk medications identified     Problem: Behavioral Health Comorbidity  Goal: Maintenance of Behavioral Health Symptom Control  Outcome: Ongoing, Progressing  Intervention: Maintain Behavioral Health Symptom Control  Flowsheets (Taken 11/28/2023 1156)  Medication Review/Management:   medications reviewed   high-risk medications identified

## 2023-11-28 NOTE — PT/OT/SLP EVAL
Physical Therapy Evaluation    Patient Name:  Nel Recinos   MRN:  [id number]    Recommendations:     Discharge Recommendations: Moderate Intensity Therapy (Low-Mod pending progress)   Discharge Equipment Recommendations: walker, rolling   Barriers to discharge: Decreased caregiver support (significant other had stroke previously), fearful of movement, low O2    Assessment:     Nel Recinos is a 73 y.o. female admitted with a medical diagnosis of Closed displaced fracture of neck of left femur.  She presents with the following impairments/functional limitations: impaired endurance, weakness, impaired functional mobility, decreased lower extremity function, pain, decreased ROM, edema, orthopedic precautions .    Rehab Prognosis: Fair; patient would benefit from acute skilled PT services to address these deficits and reach maximum level of function.    Recent Surgery: Procedure(s) (LRB):  ROBOTIC ARTHROPLASTY, HIP, TOTAL, POSTERIOR APPROACH (Left) 1 Day Post-Op    Plan:     During this hospitalization, patient to be seen BID to address the identified rehab impairments via gait training, therapeutic activities, therapeutic exercises and progress toward the following goals:    Plan of Care Expires:  12/04/23    Subjective     Chief Complaint: L hip pain  Patient/Family Comments/goals:   Pain/Comfort:  Location - Side 1: Left  Location 1: hip  Pain Addressed 1: Pre-medicate for activity, Distraction, Reposition, Cessation of Activity    Patients cultural, spiritual, Advent conflicts given the current situation:      Living Environment:  Pt lives in single story home with significant other, small threshold to enter.  Prior to admission, patients level of function was independent.  Equipment used at home: none.  DME owned (not currently used): none.  Upon discharge, patient will have assistance from significant other for some tasks.    Objective:     Communicated with nurse prior to session.  Patient found supine  with oxygen, hip abduction pillow, peripheral IV, arenas catheter  upon PT entry to room.    General Precautions: Standard, fall  Orthopedic Precautions:RLE weight bearing as tolerated, RLE posterior precautions   Braces:    Respiratory Status: Nasal cannula, flow 2 L/min; O2 dropped into low 80s with Nasal Cannula doffed, pt remained on O2 for rest of treatment    Exams:  RLE ROM: WFL  RLE Strength: WFL  LLE ROM: NT dt sx side  LLE Strength: NT dt sx side    Functional Mobility:  Bed Mobility:     Supine to Sit: moderate assistance  Transfers:     Sit to Stand:  contact guard assistance with rolling walker  Gait: Pt ambulated 40 ft. W rw and CGA, using step through gait pattern at slow pace with 2L O2. O2: 97% following ambulation      Treatment & Education:  Pt edu on total hip precautions and WB status    Patient left up in chair with all lines intact, call button in reach, nurse notified, and significant other present.    GOALS:   Multidisciplinary Problems       Physical Therapy Goals          Problem: Physical Therapy    Goal Priority Disciplines Outcome Goal Variances Interventions   Physical Therapy Goal     PT, PT/OT Ongoing, Progressing     Description: Pt will improve functional independence by performing:    Bed mobility: SBA  Sit to stand: SBA with rolling walker  Bed to chair t/f: SBA with Stand Step  with rolling walker  Ambulation x 200'  with SBA with rolling walker  1 Step (Curb): Min A  with rolling walker  3 Steps: Min A  with B HR  Independent with total hip HEP                        History:     History reviewed. No pertinent past medical history.    Past Surgical History:   Procedure Laterality Date    ROBOTIC ARTHROPLASTY,HIP,TOTAL,POSTERIOR APPROACH Left 11/27/2023    Procedure: ROBOTIC ARTHROPLASTY, HIP, TOTAL, POSTERIOR APPROACH;  Surgeon: Isidoro Waters MD;  Location: [facility];  Service: Orthopedics;  Laterality: Left;       Time Tracking:     PT Received On:    PT Start Time: 1010      PT Stop Time: 1050  PT Total Time (min): 40 min     Billable Minutes: Evaluation 30 and Gait Training 10      11/28/2023

## 2023-11-28 NOTE — PT/OT/SLP PROGRESS
Physical Therapy Treatment    Patient Name:  Nel Recinos   MRN:  [id number]    Recommendations:     Discharge Recommendations: Moderate Intensity Therapy (Low-Mod pending progress)  Discharge Equipment Recommendations: walker, rolling  Barriers to discharge:  impaired mobility     Assessment:     Nel Recinos is a 73 y.o. female admitted with a medical diagnosis of Closed displaced fracture of neck of left femur.  She presents with the following impairments/functional limitations: impaired endurance, weakness, impaired functional mobility, decreased lower extremity function, pain, decreased ROM, edema, orthopedic precautions .    Rehab Prognosis: Good; patient would benefit from acute skilled PT services to address these deficits and reach maximum level of function.    Recent Surgery: Procedure(s) (LRB):  ROBOTIC ARTHROPLASTY, HIP, TOTAL, POSTERIOR APPROACH (Left) 1 Day Post-Op    Plan:     During this hospitalization, patient to be seen BID to address the identified rehab impairments via gait training, therapeutic activities, therapeutic exercises and progress toward the following goals:    Plan of Care Expires:  12/04/23    Subjective     Chief Complaint: tired  Patient/Family Comments/goals: n/a  Pain/Comfort:  Pain Rating 1: 4/10  Location - Side 1: Left      Objective:     Communicated with rn prior to session.  Patient found up in chair with   upon PT entry to room.     General Precautions: Standard, fall  Orthopedic Precautions: RLE weight bearing as tolerated, RLE posterior precautions  Braces:    Respiratory Status: Nasal cannula, flow 2 L/min     Functional Mobility:  Transfers:     Sit to Stand:  stand by assistance with rolling walker  Bed to Chair: contact guard assistance with  rolling walker  using  Step Transfer  Gait: pt amb 50ft w/rw and cga for safety. Pt amb w/slow pace and step to gait pattern.           Patient left up in chair with all lines intact and call button in reach..    GOALS:    Multidisciplinary Problems       Physical Therapy Goals          Problem: Physical Therapy    Goal Priority Disciplines Outcome Goal Variances Interventions   Physical Therapy Goal     PT, PT/OT Ongoing, Progressing     Description: Pt will improve functional independence by performing:    Bed mobility: SBA  Sit to stand: SBA with rolling walker  Bed to chair t/f: SBA with Stand Step  with rolling walker  Ambulation x 200'  with SBA with rolling walker  1 Step (Curb): Min A  with rolling walker  3 Steps: Min A  with B HR  Independent with total hip HEP                        Time Tracking:     PT Received On:    PT Start Time: 1345     PT Stop Time: 1410  PT Total Time (min): 25 min     Billable Minutes: Gait Training 15 and Therapeutic Activity 10    Treatment Type: Treatment  PT/PTA: PTA     Number of PTA visits since last PT visit: 1 11/28/2023

## 2023-11-28 NOTE — NURSING
Went to d/c patient's deepa this AM and she requested to keep it in until physical therapy works with her

## 2023-11-28 NOTE — PT/OT/SLP PROGRESS
"Occupational Therapy   Treatment    Name: Nel Recinos  MRN: [id number]  Admitting Diagnosis:  Closed displaced fracture of neck of left femur  1 Day Post-Op    Recommendations:     Discharge Recommendations: High Intensity Therapy (High Intensity Therapy vs. Low Intensity Therapy pending patient's progress)  Discharge Equipment Recommendations:  bedside commode, hip kit, walker, rolling (TTB)  Barriers to discharge:       Assessment:     Nel Recinos is a 73 y.o. female with a medical diagnosis of Closed displaced fracture of neck of left femur. Performance deficits affecting function are weakness, impaired endurance, impaired self care skills, impaired functional mobility, gait instability, impaired balance, decreased lower extremity function, pain, orthopedic precautions.     Rehab Prognosis:  Good; patient would benefit from acute skilled OT services to address these deficits and reach maximum level of function.       Plan:     Patient to be seen  (QD-BID) to address the above listed problems via self-care/home management, therapeutic activities, therapeutic exercises  Plan of Care Expires: 12/05/23  Plan of Care Reviewed with: patient, friend    Subjective     Chief Complaint: Tired  Patient/Family Comments/goals: "I'm doing better this afternoon than this morning." "I'm supposed to get this catheter  Pain/Comfort:  Pain Rating 1: 3/10  Location - Side 1: Left  Location 1: hip  Pain Addressed 1: Reposition    Objective:     Communicated with: Nursing, PTA prior to session.  Patient found up in chair with arenas catheter, oxygen, telemetry, pulse ox (continuous) upon OT entry to room.    General Precautions: Standard, fall    Orthopedic Precautions:LLE weight bearing as tolerated, LLE posterior precautions  Braces: N/A  Respiratory Status: Nasal cannula, flow 2 L/min     Occupational Performance:     Activities of Daily Living:  Lower Body Dressing: stand by assistance with use of reacher and sock aid. " Reviewed hip precautions and reason for with pt and . Pt able to recall 2/3 precautions. Educated on use of AE to perform LBD in order to adhere to precautions. Pt able to return demonstration of LBD with use of AE.    Treatment & Education:  Use of AE for LBD in order to adhere to hip precautions.    Patient left up in chair with all lines intact, call button in reach, and  present    GOALS:   Multidisciplinary Problems       Occupational Therapy Goals          Problem: Occupational Therapy    Goal Priority Disciplines Outcome Interventions   Occupational Therapy Goal     OT, PT/OT Ongoing, Progressing    Description: Pt will perform LB dressing Mod I by d/c.  Pt will perform toileting Mod I by d/c.  Pt will perform toilet t/f Mod I by d/c.  Pt will perform tub t/f Mod I assist c TTB by d/c.  Pt will perform shower t/f Mod I by d/c.  Pt will perform car t/f SBA in adherence to pxns by d/c.                        Time Tracking:     OT Date of Treatment: 11/28/23  OT Start Time: 1415  OT Stop Time: 1450  OT Total Time (min): 35 min    Billable Minutes:Self Care/Home Management 35    OT/JOHAN: OT          11/28/2023

## 2023-11-29 LAB
ANION GAP SERPL CALC-SCNC: 4 MEQ/L
BUN SERPL-MCNC: 12 MG/DL (ref 9.8–20.1)
CALCIUM SERPL-MCNC: 8.6 MG/DL (ref 8.4–10.2)
CHLORIDE SERPL-SCNC: 107 MMOL/L (ref 98–107)
CO2 SERPL-SCNC: 27 MMOL/L (ref 23–31)
CREAT SERPL-MCNC: 0.65 MG/DL (ref 0.55–1.02)
CREAT/UREA NIT SERPL: 18
ERYTHROCYTE [DISTWIDTH] IN BLOOD BY AUTOMATED COUNT: 12.8 % (ref 11.5–17)
GFR SERPLBLD CREATININE-BSD FMLA CKD-EPI: >60 MLS/MIN/1.73/M2
GLUCOSE SERPL-MCNC: 114 MG/DL (ref 82–115)
HCT VFR BLD AUTO: 26.5 % (ref 37–47)
HGB BLD-MCNC: 8.9 G/DL (ref 12–16)
MCH RBC QN AUTO: 30.8 PG (ref 27–31)
MCHC RBC AUTO-ENTMCNC: 33.6 G/DL (ref 33–36)
MCV RBC AUTO: 91.7 FL (ref 80–94)
NRBC BLD AUTO-RTO: 0 %
PLATELET # BLD AUTO: 137 X10(3)/MCL (ref 130–400)
PMV BLD AUTO: 10.2 FL (ref 7.4–10.4)
POTASSIUM SERPL-SCNC: 3.4 MMOL/L (ref 3.5–5.1)
RBC # BLD AUTO: 2.89 X10(6)/MCL (ref 4.2–5.4)
SODIUM SERPL-SCNC: 138 MMOL/L (ref 136–145)
WBC # SPEC AUTO: 7.34 X10(3)/MCL (ref 4.5–11.5)

## 2023-11-29 PROCEDURE — 85027 COMPLETE CBC AUTOMATED: CPT | Performed by: ORTHOPAEDIC SURGERY

## 2023-11-29 PROCEDURE — 94761 N-INVAS EAR/PLS OXIMETRY MLT: CPT

## 2023-11-29 PROCEDURE — 25000003 PHARM REV CODE 250: Performed by: ORTHOPAEDIC SURGERY

## 2023-11-29 PROCEDURE — 97530 THERAPEUTIC ACTIVITIES: CPT

## 2023-11-29 PROCEDURE — 97530 THERAPEUTIC ACTIVITIES: CPT | Mod: CQ

## 2023-11-29 PROCEDURE — 25000003 PHARM REV CODE 250: Performed by: INTERNAL MEDICINE

## 2023-11-29 PROCEDURE — 94799 UNLISTED PULMONARY SVC/PX: CPT | Mod: XB

## 2023-11-29 PROCEDURE — 94640 AIRWAY INHALATION TREATMENT: CPT

## 2023-11-29 PROCEDURE — 97535 SELF CARE MNGMENT TRAINING: CPT

## 2023-11-29 PROCEDURE — 11000001 HC ACUTE MED/SURG PRIVATE ROOM

## 2023-11-29 PROCEDURE — 80048 BASIC METABOLIC PNL TOTAL CA: CPT | Performed by: ORTHOPAEDIC SURGERY

## 2023-11-29 PROCEDURE — 25000242 PHARM REV CODE 250 ALT 637 W/ HCPCS: Performed by: INTERNAL MEDICINE

## 2023-11-29 PROCEDURE — 99900031 HC PATIENT EDUCATION (STAT)

## 2023-11-29 PROCEDURE — 97116 GAIT TRAINING THERAPY: CPT

## 2023-11-29 PROCEDURE — 27000221 HC OXYGEN, UP TO 24 HOURS

## 2023-11-29 RX ORDER — SOTALOL HYDROCHLORIDE 80 MG/1
80 TABLET ORAL 2 TIMES DAILY
Status: DISCONTINUED | OUTPATIENT
Start: 2023-11-29 | End: 2023-12-01 | Stop reason: HOSPADM

## 2023-11-29 RX ORDER — NAPROXEN SODIUM 220 MG/1
81 TABLET, FILM COATED ORAL 2 TIMES DAILY
Status: DISCONTINUED | OUTPATIENT
Start: 2023-11-29 | End: 2023-11-30

## 2023-11-29 RX ORDER — IPRATROPIUM BROMIDE AND ALBUTEROL SULFATE 2.5; .5 MG/3ML; MG/3ML
3 SOLUTION RESPIRATORY (INHALATION)
Status: DISCONTINUED | OUTPATIENT
Start: 2023-11-29 | End: 2023-12-01 | Stop reason: HOSPADM

## 2023-11-29 RX ADMIN — FAMOTIDINE 20 MG: 20 TABLET ORAL at 08:11

## 2023-11-29 RX ADMIN — ACETAMINOPHEN 500 MG: 500 TABLET ORAL at 02:11

## 2023-11-29 RX ADMIN — POLYETHYLENE GLYCOL 3350 17 G: 17 POWDER, FOR SOLUTION ORAL at 08:11

## 2023-11-29 RX ADMIN — PANTOPRAZOLE SODIUM 40 MG: 40 TABLET, DELAYED RELEASE ORAL at 05:11

## 2023-11-29 RX ADMIN — TRAMADOL HYDROCHLORIDE 50 MG: 50 TABLET, COATED ORAL at 08:11

## 2023-11-29 RX ADMIN — IPRATROPIUM BROMIDE AND ALBUTEROL SULFATE 3 ML: .5; 3 SOLUTION RESPIRATORY (INHALATION) at 07:11

## 2023-11-29 RX ADMIN — ASPIRIN 81 MG CHEWABLE TABLET 81 MG: 81 TABLET CHEWABLE at 08:11

## 2023-11-29 RX ADMIN — POTASSIUM BICARBONATE 25 MEQ: 977.5 TABLET, EFFERVESCENT ORAL at 04:11

## 2023-11-29 RX ADMIN — IPRATROPIUM BROMIDE AND ALBUTEROL SULFATE 3 ML: .5; 3 SOLUTION RESPIRATORY (INHALATION) at 02:11

## 2023-11-29 RX ADMIN — ACETAMINOPHEN 500 MG: 500 TABLET ORAL at 08:11

## 2023-11-29 RX ADMIN — SERTRALINE HYDROCHLORIDE 50 MG: 50 TABLET ORAL at 08:11

## 2023-11-29 RX ADMIN — SOTALOL HYDROCHLORIDE 80 MG: 80 TABLET ORAL at 08:11

## 2023-11-29 RX ADMIN — ACETAMINOPHEN 500 MG: 500 TABLET ORAL at 04:11

## 2023-11-29 RX ADMIN — ASPIRIN 81 MG CHEWABLE TABLET 81 MG: 81 TABLET CHEWABLE at 02:11

## 2023-11-29 RX ADMIN — ATORVASTATIN CALCIUM 20 MG: 10 TABLET, FILM COATED ORAL at 08:11

## 2023-11-29 NOTE — PT/OT/SLP PROGRESS
"Occupational Therapy   Treatment    Name: Nel Recinos  MRN: [id number]  Admitting Diagnosis:  Closed displaced fracture of neck of left femur  2 Days Post-Op    Recommendations:     Discharge Recommendations: High Intensity Therapy (High Intensity Therapy vs. Low Intensity Therapy pending patient's progress)  Discharge Equipment Recommendations:  bedside commode, hip kit, walker, rolling (TTB)  Barriers to discharge:       Assessment:     Nel Recinos is a 73 y.o. female with a medical diagnosis of Closed displaced fracture of neck of left femur. Performance deficits affecting function are impaired endurance, impaired functional mobility, decreased lower extremity function, pain, decreased ROM, orthopedic precautions.     Rehab Prognosis:  Good; patient would benefit from acute skilled OT services to address these deficits and reach maximum level of function.       Plan:     Patient to be seen  (QD-BID) to address the above listed problems via self-care/home management, therapeutic activities, therapeutic exercises  Plan of Care Expires: 12/05/23  Plan of Care Reviewed with: patient    Subjective     Chief Complaint: No complaints   Patient/Family Comments/goals: "I am hoping I can go home tomorrow"   Pain/Comfort:  Location - Side 1: Left  Location 1: hip  Pain Addressed 1: Reposition    Objective:     Communicated with: Nursing prior to session.  Patient found up in chair with peripheral IV, oxygen upon OT entry to room.    General Precautions: Standard, fall    Orthopedic Precautions:LLE weight bearing as tolerated, LLE posterior precautions  Braces: N/A  Respiratory Status: Nasal cannula, flow 2 L/min     Occupational Performance:     Functional Mobility/Transfers:  Patient completed Sit <> Stand Transfer with stand by assistance  with  rolling walker   Patient completed car transfer with SBA using RW and leg  to assist with L LE. Patient able to maintain hip precautions throughout.   Functional " Mobility: Patient performed FM in hallway with SBA using RW, ~250 ft.     Activities of Daily Living:  Lower Body Dressing: stand by assistance Pt donned underwear using reacher, dressing sx leg first. SBA to pull over hips. Patient donned/doffed socks using reacher and sock aide with  independence.     Patient left up in chair with all lines intact and call button in reach    GOALS:   Multidisciplinary Problems       Occupational Therapy Goals          Problem: Occupational Therapy    Goal Priority Disciplines Outcome Interventions   Occupational Therapy Goal     OT, PT/OT Ongoing, Progressing    Description: Pt will perform LB dressing Mod I by d/c.  Pt will perform toileting Mod I by d/c.  Pt will perform toilet t/f Mod I by d/c.  Pt will perform tub t/f Mod I assist c TTB by d/c.  Pt will perform shower t/f Mod I by d/c.  Pt will perform car t/f SBA in adherence to pxns by d/c.                      Time Tracking:     OT Date of Treatment: 11/29/23  OT Start Time: 1508  OT Stop Time: 1535  OT Total Time (min): 27 min    Billable Minutes:Self Care/Home Management 27 11/29/2023

## 2023-11-29 NOTE — PLAN OF CARE
Recvd notification from [facility] -- offering P2P prior to making determination re: inpt rehab auth. P2p has to be scheduled by 11/30 before 1p. Call [phone]- [employer].     Dr. Ball notified.

## 2023-11-29 NOTE — PLAN OF CARE
Problem: Occupational Therapy  Goal: Occupational Therapy Goal  Description: Pt will perform LB dressing Mod I by d/c.  Pt will perform toileting Mod I by d/c.  Pt will perform toilet t/f Mod I by d/c.  Pt will perform shower t/f Mod I by d/c.  Pt will perform car t/f SBA in adherence to pxns by d/c.   Outcome: Ongoing, Progressing

## 2023-11-29 NOTE — PT/OT/SLP PROGRESS
Physical Therapy Treatment    Patient Name:  Nel Recinos   MRN:  [id number]    Recommendations:     Discharge Recommendations: High Intensity Therapy  Discharge Equipment Recommendations: walker, rolling  Barriers to discharge:  impaired functional mobility     Assessment:     Nel Recinos is a 73 y.o. female admitted with a medical diagnosis of Closed displaced fracture of neck of left femur.  She presents with the following impairments/functional limitations: impaired endurance, weakness, impaired functional mobility, decreased lower extremity function, pain, decreased ROM, edema, orthopedic precautions .    Rehab Prognosis: Good; patient would benefit from acute skilled PT services to address these deficits and reach maximum level of function.    Recent Surgery: Procedure(s) (LRB):  ROBOTIC ARTHROPLASTY, HIP, TOTAL, POSTERIOR APPROACH (Left) 2 Days Post-Op    Plan:     During this hospitalization, patient to be seen BID to address the identified rehab impairments via gait training, therapeutic activities, therapeutic exercises and progress toward the following goals:    Plan of Care Expires:  12/04/23    Subjective     Chief Complaint: n/a  Patient/Family Comments/goals: get better   Pain/Comfort:         Objective:     Communicated with rn prior to session.  Patient found up in chair with   upon PT entry to room.     General Precautions: Standard, fall  Orthopedic Precautions: RLE weight bearing as tolerated, RLE posterior precautions  Braces:    Respiratory Status: 2 Litter o2, nasal canula      Functional Mobility:  Transfers:     Sit to Stand:  contact guard assistance with rolling walker  Bed to Chair: contact guard assistance with  rolling walker  using  Step Transfer  Gait: pt amb 200ft w/rw and cga fofr safety. Pt pt amb with decrease gait speed.   Pt ascended/descended a 4 inch curb with supervision/touching assist using RW.         Patient left up in chair with all lines intact and call button in  reach..    GOALS:   Multidisciplinary Problems       Physical Therapy Goals          Problem: Physical Therapy    Goal Priority Disciplines Outcome Goal Variances Interventions   Physical Therapy Goal     PT, PT/OT Ongoing, Progressing     Description: Pt will improve functional independence by performing:    Bed mobility: SBA  Sit to stand: SBA with rolling walker  Bed to chair t/f: SBA with Stand Step  with rolling walker  Ambulation x 200'  with SBA with rolling walker  1 Step (Curb): Min A  with rolling walker-met  3 Steps: Min A  with B HR  Independent with total hip HEP                        Time Tracking:     PT Received On:    PT Start Time: 1337     PT Stop Time: 1404  PT Total Time (min): 27 min     Billable Minutes: Therapeutic Activity 27    Treatment Type: Treatment  PT/PTA: PTA     Number of PTA visits since last PT visit: 1 11/29/2023

## 2023-11-29 NOTE — PLAN OF CARE
Problem: Adult Inpatient Plan of Care  Goal: Plan of Care Review  Outcome: Ongoing, Progressing  Goal: Patient-Specific Goal (Individualized)  Outcome: Ongoing, Progressing  Goal: Absence of Hospital-Acquired Illness or Injury  Outcome: Ongoing, Progressing  Goal: Optimal Comfort and Wellbeing  Outcome: Ongoing, Progressing  Goal: Readiness for Transition of Care  Outcome: Ongoing, Progressing     Problem: Fall Injury Risk  Goal: Absence of Fall and Fall-Related Injury  Outcome: Ongoing, Progressing     Problem: Skin Injury Risk Increased  Goal: Skin Health and Integrity  Outcome: Ongoing, Progressing     Problem: Infection  Goal: Absence of Infection Signs and Symptoms  Outcome: Ongoing, Progressing     Problem: Bleeding (Orthopaedic Fracture)  Goal: Absence of Bleeding  Outcome: Ongoing, Progressing     Problem: Functional Ability Impaired (Orthopaedic Fracture)  Goal: Optimal Functional Ability  Outcome: Ongoing, Progressing     Problem: Infection (Orthopaedic Fracture)  Goal: Absence of Infection Signs and Symptoms  Outcome: Ongoing, Progressing     Problem: Neurovascular Compromise (Orthopaedic Fracture)  Goal: Effective Tissue Perfusion  Outcome: Ongoing, Progressing     Problem: Pain (Orthopaedic Fracture)  Goal: Acceptable Pain Control  Outcome: Ongoing, Progressing     Problem: Respiratory Compromise (Orthopaedic Fracture)  Goal: Effective Oxygenation and Ventilation  Outcome: Ongoing, Progressing     Problem: Asthma Comorbidity  Goal: Maintenance of Asthma Control  Outcome: Ongoing, Progressing

## 2023-11-29 NOTE — PLAN OF CARE
Problem: Physical Therapy  Goal: Physical Therapy Goal  Description: Pt will improve functional independence by performing:    Bed mobility: SBA  Sit to stand: SBA with rolling walker  Bed to chair t/f: SBA with Stand Step  with rolling walker  Ambulation x 200'  with SBA with rolling walker  1 Step (Curb): Min A  with rolling walker-met  3 Steps: Min A  with B HR  Independent with total hip HEP   Outcome: Ongoing, Progressing

## 2023-11-29 NOTE — PT/OT/SLP PROGRESS
Occupational Therapy   Treatment    Name: Nel Recinos  MRN: [id number]  Admitting Diagnosis:  Closed displaced fracture of neck of left femur  2 Days Post-Op    Recommendations:     Discharge Recommendations: High Intensity Therapy (High Intensity Therapy vs. Low Intensity Therapy pending patient's progress)  Discharge Equipment Recommendations:  bedside commode, hip kit, walker, rolling (TTB)  Barriers to discharge:       Assessment:     Nel Recinos is a 73 y.o. female with a medical diagnosis of Closed displaced fracture of neck of left femur.  Performance deficits affecting function are impaired endurance, weakness, impaired functional mobility, impaired self care skills, gait instability, decreased lower extremity function, pain, decreased ROM, orthopedic precautions.     Rehab Prognosis:  Good; patient would benefit from acute skilled OT services to address these deficits and reach maximum level of function.       Plan:     Patient to be seen  (QD-BID) to address the above listed problems via self-care/home management, therapeutic activities, therapeutic exercises  Plan of Care Expires: 12/05/23  Plan of Care Reviewed with: patient    Subjective     Chief Complaint: No complaints   Patient/Family Comments/goals: To get stronger   Pain/Comfort:  Location - Side 1: Left  Location 1: hip  Pain Addressed 1: Reposition    Objective:     Communicated with: Nursing prior to session.  Patient found up in chair with peripheral IV, oxygen upon OT entry to room.    General Precautions: Standard, fall    Orthopedic Precautions:LLE weight bearing as tolerated, LLE posterior precautions  Braces: N/A  Respiratory Status: Nasal cannula, flow 2 L/min     Occupational Performance:     Functional Mobility/Transfers:  Patient completed Sit <> Stand Transfer with stand by assistance  with  rolling walker   Patient completed  Shower Transfer Step Transfer technique with contact guard assistance with rolling walker  Functional  Mobility: Pt performed FM in hallway with SBA using RW, ~350 ft. No LOB, slow pace    Treatment & Education:  Pt educated on shower safety to decrease risk of falls    Patient left up in chair with all lines intact and call button in reach    GOALS:   Multidisciplinary Problems       Occupational Therapy Goals          Problem: Occupational Therapy    Goal Priority Disciplines Outcome Interventions   Occupational Therapy Goal     OT, PT/OT Ongoing, Progressing    Description: Pt will perform LB dressing Mod I by d/c.  Pt will perform toileting Mod I by d/c.  Pt will perform toilet t/f Mod I by d/c.  Pt will perform tub t/f Mod I assist c TTB by d/c.  Pt will perform shower t/f Mod I by d/c.  Pt will perform car t/f SBA in adherence to pxns by d/c.                      Time Tracking:     OT Date of Treatment: 11/29/23  OT Start Time: 1001  OT Stop Time: 1030  OT Total Time (min): 29 min    Billable Minutes:Therapeutic Activity 29 11/29/2023

## 2023-11-29 NOTE — PT/OT/SLP PROGRESS
Physical Therapy Treatment    Patient Name:  Nel Recinos   MRN:  [id number]    Recommendations:     Discharge Recommendations: High Intensity Therapy  Discharge Equipment Recommendations: walker, rolling  Barriers to discharge:  low O2    Assessment:     Nel Recinos is a 73 y.o. female admitted with a medical diagnosis of Closed displaced fracture of neck of left femur.  She presents with the following impairments/functional limitations: impaired endurance, weakness, impaired functional mobility, decreased lower extremity function, pain, decreased ROM, edema, orthopedic precautions .    Rehab Prognosis: Fair; patient would benefit from acute skilled PT services to address these deficits and reach maximum level of function.    Recent Surgery: Procedure(s) (LRB):  ROBOTIC ARTHROPLASTY, HIP, TOTAL, POSTERIOR APPROACH (Left) 2 Days Post-Op    Plan:     During this hospitalization, patient to be seen BID to address the identified rehab impairments via gait training, therapeutic activities, therapeutic exercises and progress toward the following goals:    Plan of Care Expires:  12/04/23    Subjective     Chief Complaint: L hip pain  Patient/Family Comments/goals:   Pain/Comfort:  Location - Side 1: Left  Location 1: hip  Pain Addressed 1: Reposition      Objective:     Communicated with nurse prior to session.  Patient found up in chair with oxygen, hip abduction pillow, peripheral IV, arenas catheter upon PT entry to room.     General Precautions: Standard, fall  Orthopedic Precautions: RLE weight bearing as tolerated, RLE posterior precautions  Braces:    Respiratory Status: Room air     Functional Mobility:  Transfers:     Sit to Stand:  contact guard assistance with rolling walker  Toilet Transfer: contact guard assistance with  rolling walker  using  Step Transfer; O2 dropped to 84 after toilet transfer; raised to 96% when seated and cued for deep breaths  Car Transfer: contact guard assistance with  rolling  walker  using  Step Transfer  Gait: Pt ambulated 200 ft.; 200 ft. W rw and CGA with 2L O2, using step through gait pattern. O2 dropped to 90-94 throughout ambulation on 2L O2.  O2 89% once seated following ambulation, increased to 97% after one minute rest break on 2L O2      Treatment & Education:  Pt edu on importance of frequent mobility and deep breathing techniques    Patient left up in chair with all lines intact, call button in reach, and nurse notified..    GOALS:   Multidisciplinary Problems       Physical Therapy Goals          Problem: Physical Therapy    Goal Priority Disciplines Outcome Goal Variances Interventions   Physical Therapy Goal     PT, PT/OT Ongoing, Progressing     Description: Pt will improve functional independence by performing:    Bed mobility: SBA  Sit to stand: SBA with rolling walker  Bed to chair t/f: SBA with Stand Step  with rolling walker  Ambulation x 200'  with SBA with rolling walker  1 Step (Curb): Min A  with rolling walker  3 Steps: Min A  with B HR  Independent with total hip HEP                        Time Tracking:     PT Received On:    PT Start Time: 0844     PT Stop Time: 0917  PT Total Time (min): 33 min     Billable Minutes: Gait Training 18 and Therapeutic Activity 15    Treatment Type: Treatment  PT/PTA: PT     Number of PTA visits since last PT visit: 0     11/29/2023

## 2023-11-29 NOTE — PROGRESS NOTES
"No acute events overnight.  Pain controlled.  Resting in bed.     Vital Signs  Temp: 98.1 °F (36.7 °C)  Temp Source: Oral  Pulse: 101  Resp: 18  SpO2: (!) 93 %  Pulse Oximetry Type: Intermittent  Flow (L/min): 3  Device (Oxygen Therapy): nasal cannula  BP: 105/69  BP Location: Right arm  Height and Weight  Height: 5' 4" (162.6 cm)  Weight: 68 kg (150 lb)  Weight Method: Standard Scale  Dosing Weight: 68 kg (150 lb)  BSA (Calculated - sq m): 1.75 sq meters  BMI (Calculated): 25.7  Weight in (lb) to have BMI = 25: 145.3]    +FHL/EHL  BCR distally  Dressing c/d/i  SILT distally    Recent Lab Results         11/29/23  0543        Anion Gap 4.0       BUN 12.0       BUN/CREAT RATIO 18       Calcium 8.6       Chloride 107       CO2 27       Creatinine 0.65       eGFR >60       Glucose 114       Hematocrit 26.5       Hemoglobin 8.9       MCH 30.8       MCHC 33.6       MCV 91.7       MPV 10.2       nRBC 0.0       Platelet Count 137       Potassium 3.4       RBC 2.89       RDW 12.8       Sodium 138       WBC 7.34               A/P:  Status post OLENA for fracture  Pain controlled  Overall patient doing well.  Therapy for mobility and ambulation.  ASA for DVT PPx  Home vs placement  "

## 2023-11-29 NOTE — ANESTHESIA POSTPROCEDURE EVALUATION
Anesthesia Post Evaluation    Patient: Nel Recinos    Procedure(s) Performed: Procedure(s) (LRB):  ROBOTIC ARTHROPLASTY, HIP, TOTAL, POSTERIOR APPROACH (Left)    Final Anesthesia Type: general      Patient location during evaluation: PACU  Patient participation: Yes- Able to Participate  Level of consciousness: awake and alert and oriented  Post-procedure vital signs: reviewed and stable  Pain management: adequate  Airway patency: patent    PONV status at discharge: No PONV  Anesthetic complications: no      Cardiovascular status: hemodynamically stable  Respiratory status: unassisted  Hydration status: euvolemic  Follow-up not needed.      Final Billing Type: Medically Directed        Vitals Value Taken Time   /69 11/29/23 0740   Temp 36.7 °C (98.1 °F) 11/29/23 0740   Pulse 101 11/29/23 0740   Resp 18 11/29/23 0838   SpO2 93 % 11/29/23 0740         Event Time   Out of Recovery 11/27/2023 17:40:00         Pain/Dagmar Score: Pain Rating Prior to Med Admin: 3 (11/29/2023  8:38 AM)  Pain Rating Post Med Admin: 0 (11/29/2023  5:25 AM)

## 2023-11-29 NOTE — PROGRESS NOTES
Hospital Medicine  Progress Note    Patient Name: Nel Recinos  MRN: [id number]  Status: IP- Inpatient   Admission Date: 11/25/2023  Length of Stay: 4  Date of Service: 11/29/2023       CC: hospital follow-up for hip fracture       SUBJECTIVE   73 year old female with a history that includes A fib on Eliquis, presented to the ED 11/25 after a trip and fall, landing on her left hip.  Patient notes home being renovated at present and she tripped over the visqueen on the floor. Denied hitting her head or LOC.   Evaluation in ED included x-ray noting subcapital left femoral neck fracture.   Orthopedics was consulted and patient was admitted to Hospital Medicine services.    Today: The pt is in her chair. She has no c/o. She is on 2L NC, sats 90%. She has rare nonproductive cough. Denies wheezing or hx of smoking. She denies dyspnea on exertion. Her HR was in the 40s early this am. She was asymptomatic. Her am sotalol dose was held. Current .      MEDICATIONS   Scheduled   acetaminophen  500 mg Oral 6 times per day    atorvastatin  20 mg Oral QHS    [START ON 11/30/2023] bisacodyL  10 mg Rectal Daily    famotidine  20 mg Oral BID    pantoprazole  40 mg Oral QAM    polyethylene glycol  17 g Oral BID    senna-docusate 8.6-50 mg  2 tablet Oral QHS    sertraline  50 mg Oral Daily    sotaloL  120 mg Oral BID     Continuous Infusions        PHYSICAL EXAM   VITALS: T 97.8 °F (36.6 °C)   BP (!) 93/48   P (!) 44   RR 18   O2 99 %    GENERAL: Awake and in NAD  LUNGS: CTA B/L, no wheezing or rhonchi  CVS: Normal rate, irreg rhythm, no murmurs  GI/: Soft, NT, bowel sounds positive.  EXTREMITIES:  left hip surgery  NEURO: AAOx3  PSYCH: Cooperative      LABS   CBC  Recent Labs     11/28/23 0519 11/29/23 0543   WBC 7.88 7.34   RBC 3.08* 2.89*   HGB 9.4* 8.9*   HCT 28.3* 26.5*   MCV 91.9 91.7   MCH 30.5 30.8   MCHC 33.2 33.6   RDW 13.0 12.8    137       CHEM  Recent Labs     11/28/23 0519 11/29/23 0543    960    K 3.6 3.4*   CHLORIDE 105 107   CO2 27 27   BUN 14.6 12.0   CREATININE 0.73 0.65   GLUCOSE 103 114   CALCIUM 8.6 8.6           ASSESSMENT   Left IT femur fracture (closed/displaced)/left Fer total hip arthroplasty 11/27  Afib on Eliquis (last dose 11/24 PM)  Bradycardia  Hypoxia  Hypotension  Hx of asthma  Low potassium    PLAN     Start duoneb  Recent CXR reviewed  PTOT  Resume eliquis once ok with ortho  Resume stotalol at lower dose  Monitor HR and BP  Replace potassium    Prophylaxis: ASA BID      Fito Ball MD  [facility]

## 2023-11-30 LAB
ANION GAP SERPL CALC-SCNC: 10 MEQ/L
BUN SERPL-MCNC: 11.5 MG/DL (ref 9.8–20.1)
CALCIUM SERPL-MCNC: 9.2 MG/DL (ref 8.4–10.2)
CHLORIDE SERPL-SCNC: 104 MMOL/L (ref 98–107)
CO2 SERPL-SCNC: 28 MMOL/L (ref 23–31)
CREAT SERPL-MCNC: 0.65 MG/DL (ref 0.55–1.02)
CREAT/UREA NIT SERPL: 18
ERYTHROCYTE [DISTWIDTH] IN BLOOD BY AUTOMATED COUNT: 12.8 % (ref 11.5–17)
GFR SERPLBLD CREATININE-BSD FMLA CKD-EPI: >60 MLS/MIN/1.73/M2
GLUCOSE SERPL-MCNC: 102 MG/DL (ref 82–115)
HCT VFR BLD AUTO: 27.6 % (ref 37–47)
HGB BLD-MCNC: 9.1 G/DL (ref 12–16)
MAGNESIUM SERPL-MCNC: 1.9 MG/DL (ref 1.6–2.6)
MCH RBC QN AUTO: 30.4 PG (ref 27–31)
MCHC RBC AUTO-ENTMCNC: 33 G/DL (ref 33–36)
MCV RBC AUTO: 92.3 FL (ref 80–94)
NRBC BLD AUTO-RTO: 0 %
PLATELET # BLD AUTO: 177 X10(3)/MCL (ref 130–400)
PMV BLD AUTO: 10.4 FL (ref 7.4–10.4)
POTASSIUM SERPL-SCNC: 3.6 MMOL/L (ref 3.5–5.1)
RBC # BLD AUTO: 2.99 X10(6)/MCL (ref 4.2–5.4)
SODIUM SERPL-SCNC: 142 MMOL/L (ref 136–145)
WBC # SPEC AUTO: 7.21 X10(3)/MCL (ref 4.5–11.5)

## 2023-11-30 PROCEDURE — 94640 AIRWAY INHALATION TREATMENT: CPT

## 2023-11-30 PROCEDURE — 94799 UNLISTED PULMONARY SVC/PX: CPT | Mod: XB

## 2023-11-30 PROCEDURE — 27000221 HC OXYGEN, UP TO 24 HOURS

## 2023-11-30 PROCEDURE — 25000003 PHARM REV CODE 250: Performed by: INTERNAL MEDICINE

## 2023-11-30 PROCEDURE — 97530 THERAPEUTIC ACTIVITIES: CPT

## 2023-11-30 PROCEDURE — 85027 COMPLETE CBC AUTOMATED: CPT | Performed by: INTERNAL MEDICINE

## 2023-11-30 PROCEDURE — 25000242 PHARM REV CODE 250 ALT 637 W/ HCPCS: Performed by: INTERNAL MEDICINE

## 2023-11-30 PROCEDURE — 97116 GAIT TRAINING THERAPY: CPT | Mod: CQ

## 2023-11-30 PROCEDURE — 99900035 HC TECH TIME PER 15 MIN (STAT)

## 2023-11-30 PROCEDURE — 97530 THERAPEUTIC ACTIVITIES: CPT | Mod: CQ

## 2023-11-30 PROCEDURE — 25000003 PHARM REV CODE 250: Performed by: ORTHOPAEDIC SURGERY

## 2023-11-30 PROCEDURE — 97535 SELF CARE MNGMENT TRAINING: CPT

## 2023-11-30 PROCEDURE — 11000001 HC ACUTE MED/SURG PRIVATE ROOM

## 2023-11-30 PROCEDURE — 94761 N-INVAS EAR/PLS OXIMETRY MLT: CPT

## 2023-11-30 PROCEDURE — 25000003 PHARM REV CODE 250: Performed by: NURSE PRACTITIONER

## 2023-11-30 PROCEDURE — 83735 ASSAY OF MAGNESIUM: CPT | Performed by: INTERNAL MEDICINE

## 2023-11-30 PROCEDURE — 80048 BASIC METABOLIC PNL TOTAL CA: CPT | Performed by: INTERNAL MEDICINE

## 2023-11-30 RX ORDER — POLYETHYLENE GLYCOL 3350 17 G/17G
17 POWDER, FOR SOLUTION ORAL DAILY PRN
Status: DISCONTINUED | OUTPATIENT
Start: 2023-11-30 | End: 2023-12-01 | Stop reason: HOSPADM

## 2023-11-30 RX ORDER — CEFADROXIL 500 MG/1
500 CAPSULE ORAL EVERY 12 HOURS
Status: DISCONTINUED | OUTPATIENT
Start: 2023-11-30 | End: 2023-12-01

## 2023-11-30 RX ADMIN — FAMOTIDINE 20 MG: 20 TABLET ORAL at 10:11

## 2023-11-30 RX ADMIN — SERTRALINE HYDROCHLORIDE 50 MG: 50 TABLET ORAL at 10:11

## 2023-11-30 RX ADMIN — APIXABAN 5 MG: 5 TABLET, FILM COATED ORAL at 10:11

## 2023-11-30 RX ADMIN — ACETAMINOPHEN 500 MG: 500 TABLET ORAL at 04:11

## 2023-11-30 RX ADMIN — ACETAMINOPHEN 500 MG: 500 TABLET ORAL at 12:11

## 2023-11-30 RX ADMIN — CEFADROXIL 500 MG: 500 CAPSULE ORAL at 08:11

## 2023-11-30 RX ADMIN — SOTALOL HYDROCHLORIDE 80 MG: 80 TABLET ORAL at 08:11

## 2023-11-30 RX ADMIN — IPRATROPIUM BROMIDE AND ALBUTEROL SULFATE 3 ML: .5; 3 SOLUTION RESPIRATORY (INHALATION) at 07:11

## 2023-11-30 RX ADMIN — FAMOTIDINE 20 MG: 20 TABLET ORAL at 08:11

## 2023-11-30 RX ADMIN — SOTALOL HYDROCHLORIDE 80 MG: 80 TABLET ORAL at 10:11

## 2023-11-30 RX ADMIN — TRAMADOL HYDROCHLORIDE 50 MG: 50 TABLET, COATED ORAL at 08:11

## 2023-11-30 RX ADMIN — CEFADROXIL 500 MG: 500 CAPSULE ORAL at 12:11

## 2023-11-30 RX ADMIN — ALUMINUM HYDROXIDE, MAGNESIUM HYDROXIDE, AND SIMETHICONE 30 ML: 200; 200; 20 SUSPENSION ORAL at 10:11

## 2023-11-30 RX ADMIN — ATORVASTATIN CALCIUM 20 MG: 10 TABLET, FILM COATED ORAL at 08:11

## 2023-11-30 RX ADMIN — ACETAMINOPHEN 500 MG: 500 TABLET ORAL at 08:11

## 2023-11-30 RX ADMIN — TRAMADOL HYDROCHLORIDE 50 MG: 50 TABLET, COATED ORAL at 04:11

## 2023-11-30 RX ADMIN — APIXABAN 5 MG: 5 TABLET, FILM COATED ORAL at 08:11

## 2023-11-30 NOTE — PT/OT/SLP PROGRESS
Physical Therapy Treatment    Patient Name:  Nel Recinos   MRN:  [id number]    Recommendations:     Discharge Recommendations: High Intensity Therapy  Discharge Equipment Recommendations: walker, rolling  Barriers to discharge:  medical     Assessment:     Nel Recinos is a 73 y.o. female admitted with a medical diagnosis of Closed displaced fracture of neck of left femur.  She presents with the following impairments/functional limitations: impaired endurance, weakness, impaired functional mobility, decreased lower extremity function, pain, decreased ROM, edema, orthopedic precautions .    Rehab Prognosis: Good; patient would benefit from acute skilled PT services to address these deficits and reach maximum level of function.    Recent Surgery: Procedure(s) (LRB):  ROBOTIC ARTHROPLASTY, HIP, TOTAL, POSTERIOR APPROACH (Left) 3 Days Post-Op    Plan:     During this hospitalization, patient to be seen BID to address the identified rehab impairments via gait training, therapeutic activities, therapeutic exercises and progress toward the following goals:    Plan of Care Expires:  12/04/23    Subjective     Chief Complaint: n/a  Patient/Family Comments/goals: n/a  Pain/Comfort:  Pain Rating 1: 4/10  Location - Side 1: Left      Objective:     Communicated with rn prior to session.  Patient found up in chair with   upon PT entry to room.     General Precautions: Standard, fall  Orthopedic Precautions: RLE weight bearing as tolerated, RLE posterior precautions  Braces:    Respiratory Status: Nasal cannula, flow 1 L/min     Functional Mobility:  Transfers:     Sit to Stand:  stand by assistance with rolling walker  Bed to Chair: stand by assistance with  rolling walker  using  Step Transfer  Gait: pt amb 200ft w/rw and cga for safety.             Patient left up in chair with all lines intact and call button in reach..    GOALS:   Multidisciplinary Problems       Physical Therapy Goals          Problem: Physical Therapy     Goal Priority Disciplines Outcome Goal Variances Interventions   Physical Therapy Goal     PT, PT/OT Ongoing, Progressing     Description: Pt will improve functional independence by performing:    Bed mobility: SBA  Sit to stand: SBA with rolling walker  Bed to chair t/f: SBA with Stand Step  with rolling walker  Ambulation x 200'  with SBA with rolling walker  1 Step (Curb): Min A  with rolling walker-met  3 Steps: Min A  with B HR-met  Independent with total hip HEP                        Time Tracking:     PT Received On:    PT Start Time: 1449     PT Stop Time: 1507  PT Total Time (min): 18 min     Billable Minutes: Gait Training 18    Treatment Type: Treatment  PT/PTA: PTA     Number of PTA visits since last PT visit: 2     11/30/2023

## 2023-11-30 NOTE — PT/OT/SLP PROGRESS
"Occupational Therapy   Treatment    Name: Nel Recinos  MRN: [id number]  Admitting Diagnosis:  Closed displaced fracture of neck of left femur  3 Days Post-Op    Recommendations:     Discharge Recommendations: Low Intensity   Discharge Equipment Recommendations:  bedside commode, hip kit, walker, rolling (TTB)  Barriers to discharge:       Assessment:     Nel Recinos is a 73 y.o. female with a medical diagnosis of Closed displaced fracture of neck of left femur.  Performance deficits affecting function are impaired endurance, impaired functional mobility, decreased lower extremity function, pain, orthopedic precautions, decreased ROM.     Rehab Prognosis:  Good; patient would benefit from acute skilled OT services to address these deficits and reach maximum level of function.       Plan:     Patient to be seen  (QD-BID) to address the above listed problems via self-care/home management, therapeutic activities, therapeutic exercises  Plan of Care Expires: 12/05/23  Plan of Care Reviewed with: patient    Subjective     Chief Complaint: No complaints   Patient/Family Comments/goals: "I hope I can just go home soon"  Pain/Comfort:  Location - Side 1: Left  Location 1: hip  Pain Addressed 1: Reposition    Objective:     Communicated with: Nursing prior to session.  Patient found up in chair with oxygen, peripheral IV upon OT entry to room.    General Precautions: Standard, fall    Orthopedic Precautions:LLE weight bearing as tolerated, LLE posterior precautions  Braces: N/A  Respiratory Status: Nasal cannula, flow 2 L/min     Occupational Performance:     Functional Mobility/Transfers:  Patient completed Sit <> Stand Transfer with stand by assistance  with  rolling walker   Patient completed Toilet Transfer Step Transfer technique with stand by assistance with  rolling walker  Patient completed  Shower Transfer Step Transfer technique with stand by assistance with rolling walker  Functional Mobility: Patient " performed FM in hallway with SBA using RW, ~300 ft. Slow pace     Activities of Daily Living:  Toileting: modified independence      Patient left up in chair with all lines intact, call button in reach, and family members present    GOALS:   Multidisciplinary Problems       Occupational Therapy Goals          Problem: Occupational Therapy    Goal Priority Disciplines Outcome Interventions   Occupational Therapy Goal     OT, PT/OT Ongoing, Progressing    Description: Pt will perform LB dressing Mod I by d/c.  Pt will perform toileting Mod I by d/c. MET  Pt will perform toilet t/f Mod I by d/c.  Pt will perform shower t/f Mod I by d/c.  Pt will perform car t/f SBA in adherence to pxns by d/c.                      Time Tracking:     OT Date of Treatment: 11/30/23  OT Start Time: 1121  OT Stop Time: 1148  OT Total Time (min): 27 min    Billable Minutes:Self Care/Home Management 10  Therapeutic Activity 17               11/30/2023

## 2023-11-30 NOTE — PLAN OF CARE
Problem: Occupational Therapy  Goal: Occupational Therapy Goal  Description: Pt will perform LB dressing Mod I by d/c.  Pt will perform toileting Mod I by d/c. MET  Pt will perform toilet t/f Mod I by d/c.  Pt will perform shower t/f Mod I by d/c.  Pt will perform car t/f SBA in adherence to pxns by d/c.   Outcome: Ongoing, Progressing

## 2023-11-30 NOTE — PROGRESS NOTES
Hospital Medicine  Progress Note    Patient Name: Nel Recinos  MRN: [id number]  Status: IP- Inpatient   Admission Date: 11/25/2023  Length of Stay: 5  Date of Service: 11/30/2023       CC: hospital follow-up for hip fracture       SUBJECTIVE   73 year old female with a history that includes A fib on Eliquis, presented to the ED 11/25 after a trip and fall, landing on her left hip.  Patient notes home being renovated at present and she tripped over the visqueen on the floor. Denied hitting her head or LOC.   Evaluation in ED included x-ray noting subcapital left femoral neck fracture.   Orthopedics was consulted and patient was admitted to Hospital Medicine services.    Today: The pt is in her chair. She has no c/o. She is on room air.      MEDICATIONS   Scheduled   acetaminophen  500 mg Oral 6 times per day    albuterol-ipratropium  3 mL Nebulization Q6H WAKE    apixaban  5 mg Oral BID    atorvastatin  20 mg Oral QHS    bisacodyL  10 mg Rectal Daily    cefadroxil  500 mg Oral Q12H    famotidine  20 mg Oral BID    pantoprazole  40 mg Oral QAM    sertraline  50 mg Oral Daily    sotaloL  80 mg Oral BID     Continuous Infusions        PHYSICAL EXAM   VITALS: T 99.1 °F (37.3 °C)   /76   P 65   RR 16   O2 (!) 94 %    GENERAL: Awake and in NAD  LUNGS: CTA B/L, no wheezing or rhonchi  CVS: Normal rate, irreg rhythm, no murmurs  GI/: Soft, NT, bowel sounds positive.  EXTREMITIES:  left hip surgery  NEURO: AAOx3  PSYCH: Cooperative      LABS   CBC  Recent Labs     11/29/23  0543 11/30/23  0520   WBC 7.34 7.21   RBC 2.89* 2.99*   HGB 8.9* 9.1*   HCT 26.5* 27.6*   MCV 91.7 92.3   MCH 30.8 30.4   MCHC 33.6 33.0   RDW 12.8 12.8    177       CHEM  Recent Labs     11/29/23  0543 11/30/23  0520    142   K 3.4* 3.6   CHLORIDE 107 104   CO2 27 28   BUN 12.0 11.5   CREATININE 0.65 0.65   GLUCOSE 114 102   CALCIUM 8.6 9.2   MG  --  1.90           ASSESSMENT   Left IT femur fracture (closed/displaced)/left Fer  total hip arthroplasty 11/27  Afib on Eliquis (last dose 11/24 PM)  Bradycardia  Hypoxia  Hypotension  Hx of asthma  Low potassium    PLAN     Cont PTOT  Resumed eliquis   Cont stotalol at lower dose  HR and BP improved     Prophylaxis: ASA BID      Fito Ball MD  [facility]

## 2023-11-30 NOTE — PROGRESS NOTES
"No acute events overnight.  Pain controlled.  Resting in bed. Ambulating well with PT.    Vital Signs  Temp: 99.1 °F (37.3 °C)  Temp Source: Oral  Pulse: 68  Resp: 16  SpO2: (!) 91 %  Pulse Oximetry Type: Intermittent  Flow (L/min): 1  Device (Oxygen Therapy): nasal cannula  BP: (!) 144/82  BP Location: Right arm  Height and Weight  Height: 5' 4" (162.6 cm)  Weight: 68 kg (150 lb)  Weight Method: Standard Scale  Dosing Weight: 68 kg (150 lb)  BSA (Calculated - sq m): 1.75 sq meters  BMI (Calculated): 25.7  Weight in (lb) to have BMI = 25: 145.3]    +FHL/EHL  BCR distally  Dressing c/d/i  SILT distally    Recent Lab Results         11/30/23  0520        Anion Gap 10.0       BUN 11.5       BUN/CREAT RATIO 18       Calcium 9.2       Chloride 104       CO2 28       Creatinine 0.65       eGFR >60       Glucose 102       Hematocrit 27.6       Hemoglobin 9.1       Magnesium  1.90       MCH 30.4       MCHC 33.0       MCV 92.3       MPV 10.4       nRBC 0.0       Platelet Count 177       Potassium 3.6       RBC 2.99       RDW 12.8       Sodium 142       WBC 7.21               A/P:  Status post OLENA for fracture  Pain controlled  Overall patient doing well.  Therapy for mobility and ambulation.  ASA for DVT PPx  Placement pending  "

## 2023-11-30 NOTE — PROGRESS NOTES
"Inpatient Nutrition Evaluation    Admit Date: 11/25/2023   Total duration of encounter: 5 days    Nutrition Recommendation/Prescription     Continue regular diet as tolerated  Honor food preferences   Monitor and replete potassium   RD to monitor po intake and weight    Nutrition Assessment     Chart Review    Reason Seen: length of stay    Malnutrition Screening Tool Results   Have you recently lost weight without trying?: No  Have you been eating poorly because of a decreased appetite?: No   MST Score: 0     Diagnosis:  Left IT femur fracture (closed/displaced)/left Fer total hip arthroplasty 11/27  Bradycardia  Hypotension  Low potassium    Relevant Medical History: Afib on Eliquis, asthma    Nutrition-Related Medications: pepcid, protonix, miralax, senna    Nutrition-Related Labs: 11/30: RBC-2.99, H/H-9.1/27.6      Diet Order: Diet Adult Regular  Oral Supplement Order: none  Appetite/Oral Intake: fair/50-75% of meals  Factors Affecting Nutritional Intake: none identified  Food/Lutheran/Cultural Preferences: none reported  Food Allergies:  tomato    Skin Integrity: (P) incision  Wound(s):       Comments    11/30: pt reports fair intake since admission, stating does not receive what she orders. Declined ONS, will notify kitchen staff to honor food requests. Good appetite prior admission. Denies GI complaints or C/S difficulty. Denies unintentional weight loss prior admission. Per EMR weights, weight stable prior admission.     Anthropometrics    Height: 5' 4" (162.6 cm)    Last Weight: 68 kg (150 lb) (11/26/23 1523) Weight Method: Standard Scale  BMI (Calculated): 25.7  BMI Classification: overweight (BMI 25-29.9)     Ideal Body Weight (IBW), Female: 120 lb     % Ideal Body Weight, Female (lb): 125 %                             Usual Weight Provided By: EMR weight history and patient denies unintentional weight loss    Wt Readings from Last 5 Encounters:   11/26/23 68 kg (150 lb)   02/23/21 65.4 kg (144 lb 1.1 " oz)     Weight Change(s) Since Admission:  Admit Weight: 68 kg (150 lb) (11/25/23 1830)      Patient Education    Not applicable.    Monitoring & Evaluation     Dietitian will monitor food and beverage intake, weight, and electrolyte/renal panel.  Nutrition Risk/Follow-Up: low (follow-up in 5-7 days)  Patients assigned 'low nutrition risk' status do not qualify for a full nutritional assessment but will be monitored and re-evaluated in a 5-7 day time period. Please consult if re-evaluation needed sooner.

## 2023-11-30 NOTE — PT/OT/SLP PROGRESS
Physical Therapy Treatment    Patient Name:  Nel Recinos   MRN:  [id number]    Recommendations:     Discharge Recommendations: High Intensity Therapy  Discharge Equipment Recommendations: walker, rolling  Barriers to discharge:  impaired mobility     Assessment:     Nel Recinos is a 73 y.o. female admitted with a medical diagnosis of Closed displaced fracture of neck of left femur.  She presents with the following impairments/functional limitations: impaired endurance, weakness, impaired functional mobility, decreased lower extremity function, pain, decreased ROM, edema, orthopedic precautions .    Rehab Prognosis: Good; patient would benefit from acute skilled PT services to address these deficits and reach maximum level of function.    Recent Surgery: Procedure(s) (LRB):  ROBOTIC ARTHROPLASTY, HIP, TOTAL, POSTERIOR APPROACH (Left) 3 Days Post-Op    Plan:     During this hospitalization, patient to be seen BID to address the identified rehab impairments via gait training, therapeutic activities, therapeutic exercises and progress toward the following goals:    Plan of Care Expires:  12/04/23    Subjective     Chief Complaint: n/a  Patient/Family Comments/goals: n/a  Pain/Comfort:  Pain Rating 1: 4/10  Location - Side 1: Left      Objective:     Communicated with rn prior to session.  Patient found up in chair with   upon PT entry to room.     General Precautions: Standard, fall  Orthopedic Precautions: RLE weight bearing as tolerated, RLE posterior precautions  Braces:    Respiratory Status: Room air     Functional Mobility:  Transfers:     Sit to Stand:  contact guard assistance with rolling walker  Bed to Chair: stand by assistance with  rolling walker  using  Step Transfer  Gait: pt amb 200ft w/rw, pt amb w/slow gait speed and step through gait pattern.   Stairs:  Pt ascended/descended 3 stair(s) with No Assistive Device with bilateral handrails with Contact Guard Assistance.         Treatment &  Education:  Pt preformed seated ex 10x in chair.     Patient left up in chair with all lines intact and call button in reach..    GOALS:   Multidisciplinary Problems       Physical Therapy Goals          Problem: Physical Therapy    Goal Priority Disciplines Outcome Goal Variances Interventions   Physical Therapy Goal     PT, PT/OT Ongoing, Progressing     Description: Pt will improve functional independence by performing:    Bed mobility: SBA  Sit to stand: SBA with rolling walker  Bed to chair t/f: SBA with Stand Step  with rolling walker  Ambulation x 200'  with SBA with rolling walker  1 Step (Curb): Min A  with rolling walker-met  3 Steps: Min A  with B HR-met  Independent with total hip HEP                        Time Tracking:     PT Received On:    PT Start Time: 0837     PT Stop Time: 0905  PT Total Time (min): 28 min     Billable Minutes: Therapeutic Activity 28    Treatment Type: Treatment  PT/PTA: PTA     Number of PTA visits since last PT visit: 2     11/30/2023

## 2023-11-30 NOTE — PLAN OF CARE
Problem: Physical Therapy  Goal: Physical Therapy Goal  Description: Pt will improve functional independence by performing:    Bed mobility: SBA  Sit to stand: SBA with rolling walker  Bed to chair t/f: SBA with Stand Step  with rolling walker  Ambulation x 200'  with SBA with rolling walker  1 Step (Curb): Min A  with rolling walker-met  3 Steps: Min A  with B HR-met  Independent with total hip HEP   Outcome: Ongoing, Progressing      Impression: Dermatochalasis of left upper eyelid: H02.834. Plan: Patient advised they are not currently a candidate for blepharoplasty surgery.

## 2023-11-30 NOTE — PLAN OF CARE
Problem: Physical Therapy  Goal: Physical Therapy Goal  Description: Pt will improve functional independence by performing:    Bed mobility: SBA  Sit to stand: SBA with rolling walker  Bed to chair t/f: SBA with Stand Step  with rolling walker  Ambulation x 200'  with SBA with rolling walker  1 Step (Curb): Min A  with rolling walker-met  3 Steps: Min A  with B HR-met  Independent with total hip HEP   11/30/2023 1522 by Deepika Villalobos, JOSE  Outcome: Ongoing, Progressing

## 2023-11-30 NOTE — DISCHARGE INSTRUCTIONS
[facility]  [facility] [facility] [address]  [place] [address]  Phone [phone]       /      Fax [phone]  SURGEON: Dr. Waters  After discharge, all questions or concerns should be handled at your surgeon's office ([phone]). If it is a weekend or after hours, you will get the surgeon on call.     Discharge Medications:    PAIN MANAGEMENT: Next Dose Available   Tylenol/Acetaminophen 500mg- every 4 hours, around the clock (WHILE AWAKE) 8pm   Ultram/Tramadol 50mg (Pain Med) - every 4-6 hours AS NEEDED for pain When needed   COMPLICATION PREVENTION MEDS: Next Dose DUE   Eliquis 5mg - twice a day, as prior to surgey, for continued blood clot prevention.  PM on 12/1/23   MiraLAX 17gm -As needed while on narcotics and muscle relaxers for constipation prevention PM on 12/1/23   Doxycycline 100mg (Antibiotic) - twice a day for 14 days post-op for infection prevention AM on 12/2/23      Total Hip Replacement    PAIN MANAGEMENT:  Tylenol 500mg every 4 hours, around the clock (WHILE AWAKE).   Take Ultram (Tramadol) 50mg (pain pill) every 4-6 hours as needed for BREAKTHROUGH pain ONLY.    **NO MORE THAN 3000mg OF TYLENOL IN 24 HOURS**.     **Use the medication log in your discharge packet to keep track of your medications. **    Other things that help with pain control: WALKING, Ice, Compression and elevation to keep the swelling down.    BLOOD CLOT PREVENTION:   Eliquis 5mg twice a day, as prior to surgery for blood clot prevention. Start on 12/1/23.   Wear KENN hose for 2-6 weeks and until swelling decreases.  May take off at night for comfort. Hand wash and Dry. If the swelling persists in the legs after you stop wearing the KENN hose, continue to wear them until the swelling decreases. REMOVE AT LEAST DAILY FOR SKIN ASSESSMENT.   Do NOT let the stockings roll down, creating a tourniquet around the back of your knee. If you need to, pull the stockings down and leave the excess at the bottom  of the stocking.   Walk around as much as possible, AT LEAST EVERY 2 HOURS.   Increase walking distance each day.  TURN EVERY 2 HOURS AND FLOAT HEELS AT ALL TIMES IF PATIENT IS UNABLE TO DO INDEPENDENTLY.    CONSTIPATION PREVENTION:   Miralax or Senokot S/Daily-Colace and Stool softeners AS NEEDED while on pain meds.  Use other MORE AGGRESSIVE over the counter LAXATIVES as needed for constipation (examples: Milk of Magnesia, Dulcolax suppository, Magnesium Citrate, Fleet's Enema...etc.)  Drink lots of water.  Increase Fiber in diet.  Increase walking distance each day  DO NOT GO TOO LONG WITHOUT HAVING A BOWEL MOVEMENT!    ACTIVITY:   Weight bearing precautions as follows:   FULL Weight bearing as tolerated to operative leg with walker.   DO NOT TAKE YOURSELF OFF OF THE WALKER TOO SOON. ALLOW YOUR OUTPATIENT THERAPIST TO GUIDE YOU.       HIP PRECAUTIONS  DO NOT:   Twist  Lean past 90 degrees      Cross legs    After discharged from the facility, start Home Health Physical Therapy at least 3 times per week. After 2 weeks, transition to outpatient physical therapy.   No heavy lifting, pulling, pushing, straining. TAKE IT EASY!   Pillow between legs when turning in bed at night.   Walk around at least every 2 hours while awake.   Have a variety of different positions throughout the day (sitting in a dependent position, legs elevated, walking, in a recliner or chair with legs straight).     WOUND CARE:   Dry dressing changes every other day and as needed for soiling for 14 days. Start 12/3/23 [place].  DO NOT WET WOUND or apply any ointments, creams, lotions or antiseptics.  May wet incision at 2 weeks post-op, once you follow up with your surgeon.   DO NOT TOUCH INCISION  Apply ICE to the hip and thigh AS MUCH AS POSSIBLE.    URINARY RETENTION:  If you start having difficulty urinating, decrease the use of pain medication and muscle relaxers and notify your primary care doctor.     PNEUMONIA PREVENTION:  Stay out of bed  as much as possible and walk around every 2 hours.  Continue breathing exercises (Incentive Spirometry) while mobility is limited and while on pain pills.    FALL PREVENTION:  Wear sturdy shoes that fit well - Wearing shoes with high heels or slippery soles, or shoes that are too loose, can lead to falls. Walking around in bare feet, or only socks, can also increase your risk of falling.  Use walker as long as your surgeon and therapist recommend it  Use good lighting and  throw rugs, electrical cords, furniture and clutter (anything than can cause you to trip at home.   Non-slip rug in bathroom or shower    INFECTION PREVENTION:  Doxycycline 100mg (Antibiotic) - twice a day for 14 days post-op for infection prevention  Use GLOVES and Proper handwashing before and after dressing changes. Do not wet the wound. Wound care instructions as written above. NOTIFY SURGEON OF EXCESSIVE WOUND DRAINAGE.  No alcohol, smoking or tobacco products  Pets should not be allowed around the wound or the dressing.   Treat UTIs and skin infections as soon as possible.  Pre-medicate with antibiotics prior to dental or surgical procedures.   If you are diabetic, MAINTAIN GOOD BLOOD SUGAR CONTROL DURING YOUR RECOVERY (Below 150). If you continue to see high numbers, notify your primary care or diabetes doctor.    Call your SURGEON'S OFFICE if you experience the following signs and symptoms of infection:   Unusual redness, swelling, excessive, cloudy or foul smelling drainage at the incision site.   Persistent temp greater than 102 F, unrelieved by Tylenol  Pain at surgical site, unrelieved by pain meds  Warning signs of a blood clot in your leg: (CALL YOUR DOCTOR)  New onset or Increasing pain in calf, new onset tenderness or redness above or below the knee or increasing swelling of your calf, ankle, or foot.  Warning signs that a blood clot has traveled to your lungs: (REPORT TO THE ER)  Sudden or increase in Shortness of breath,  sudden onset of chest pains, or  Localized chest pain with coughing.     IF ANY ISSUES ARISE AND YOU FEEL THE NEED TO CALL YOUR PRIMARY CARE DOCTOR, PLEASE LET YOUR SURGEON KNOW AS WELL.    For emergencies, please report to OUR ([facility] or [facility] [place]) Emergency department and tell them to call Dr. Waters at [phone].

## 2023-12-01 VITALS
WEIGHT: 150 LBS | SYSTOLIC BLOOD PRESSURE: 118 MMHG | BODY MASS INDEX: 25.61 KG/M2 | HEIGHT: 64 IN | HEART RATE: 62 BPM | DIASTOLIC BLOOD PRESSURE: 62 MMHG | RESPIRATION RATE: 18 BRPM | TEMPERATURE: 98 F | OXYGEN SATURATION: 95 %

## 2023-12-01 PROBLEM — I48.91 ATRIAL FIBRILLATION: Status: ACTIVE | Noted: 2023-12-01

## 2023-12-01 PROCEDURE — 97116 GAIT TRAINING THERAPY: CPT | Mod: CQ

## 2023-12-01 PROCEDURE — 94761 N-INVAS EAR/PLS OXIMETRY MLT: CPT

## 2023-12-01 PROCEDURE — 94799 UNLISTED PULMONARY SVC/PX: CPT | Mod: XB

## 2023-12-01 PROCEDURE — 97530 THERAPEUTIC ACTIVITIES: CPT

## 2023-12-01 PROCEDURE — 25000003 PHARM REV CODE 250: Performed by: ORTHOPAEDIC SURGERY

## 2023-12-01 PROCEDURE — 97110 THERAPEUTIC EXERCISES: CPT | Mod: CQ

## 2023-12-01 PROCEDURE — 25000003 PHARM REV CODE 250: Performed by: INTERNAL MEDICINE

## 2023-12-01 PROCEDURE — 25000003 PHARM REV CODE 250: Performed by: NURSE PRACTITIONER

## 2023-12-01 PROCEDURE — 97530 THERAPEUTIC ACTIVITIES: CPT | Mod: CQ

## 2023-12-01 PROCEDURE — 27000221 HC OXYGEN, UP TO 24 HOURS

## 2023-12-01 RX ORDER — TRAMADOL HYDROCHLORIDE 50 MG/1
50 TABLET ORAL EVERY 4 HOURS PRN
Qty: 40 TABLET | Refills: 0 | Status: SHIPPED | OUTPATIENT
Start: 2023-12-01 | End: 2023-12-08

## 2023-12-01 RX ORDER — DOXYCYCLINE 100 MG/1
100 CAPSULE ORAL EVERY 12 HOURS
Qty: 28 CAPSULE | Refills: 0 | Status: SHIPPED | OUTPATIENT
Start: 2023-12-01 | End: 2023-12-15

## 2023-12-01 RX ORDER — SOTALOL HYDROCHLORIDE 80 MG/1
80 TABLET ORAL 2 TIMES DAILY
Qty: 60 TABLET | Refills: 1 | Status: SHIPPED | OUTPATIENT
Start: 2023-12-01 | End: 2024-01-30

## 2023-12-01 RX ORDER — ACETAMINOPHEN 500 MG
500 TABLET ORAL EVERY 4 HOURS
Qty: 60 TABLET | Refills: 0
Start: 2023-12-01 | End: 2023-12-11

## 2023-12-01 RX ADMIN — ACETAMINOPHEN 500 MG: 500 TABLET ORAL at 04:12

## 2023-12-01 RX ADMIN — SOTALOL HYDROCHLORIDE 80 MG: 80 TABLET ORAL at 09:12

## 2023-12-01 RX ADMIN — ACETAMINOPHEN 500 MG: 500 TABLET ORAL at 12:12

## 2023-12-01 RX ADMIN — ALUMINUM HYDROXIDE, MAGNESIUM HYDROXIDE, AND SIMETHICONE 30 ML: 200; 200; 20 SUSPENSION ORAL at 09:12

## 2023-12-01 RX ADMIN — FAMOTIDINE 20 MG: 20 TABLET ORAL at 09:12

## 2023-12-01 RX ADMIN — APIXABAN 5 MG: 5 TABLET, FILM COATED ORAL at 09:12

## 2023-12-01 RX ADMIN — PANTOPRAZOLE SODIUM 40 MG: 40 TABLET, DELAYED RELEASE ORAL at 09:12

## 2023-12-01 RX ADMIN — CEFADROXIL 500 MG: 500 CAPSULE ORAL at 09:12

## 2023-12-01 RX ADMIN — SERTRALINE HYDROCHLORIDE 50 MG: 50 TABLET ORAL at 09:12

## 2023-12-01 RX ADMIN — ACETAMINOPHEN 500 MG: 500 TABLET ORAL at 09:12

## 2023-12-01 NOTE — PT/OT/SLP PROGRESS
"Occupational Therapy   Treatment & Discharge    Name: Nel Recinos  MRN: [id number]  Admitting Diagnosis:  Closed displaced fracture of neck of left femur  4 Days Post-Op    Recommendations:     Discharge Recommendations: High Intensity Therapy (High Intensity Therapy vs. Low Intensity Therapy pending patient's progress)  Discharge Equipment Recommendations:  bedside commode, hip kit, walker, rolling (TTB)  Barriers to discharge:   None    Assessment:     Nel Recinos is a 73 y.o. female with a medical diagnosis of Closed displaced fracture of neck of left femur.  She presents with increased functional mobility and independence with BADLs. Performance deficits affecting function are weakness, impaired endurance, impaired functional mobility, decreased lower extremity function, orthopedic precautions.     Rehab Prognosis:  Good; patient would benefit from acute skilled OT services to address these deficits and reach maximum level of function.       Plan:     Patient to be seen  (QD-BID) to address the above listed problems via self-care/home management, therapeutic activities, therapeutic exercises  Plan of Care Expires: 12/05/23  Plan of Care Reviewed with: patient    Subjective     Chief Complaint: "I have a headache today, which I think is why I can't recall my precautions off of the top of my head."  Patient/Family Comments/goals: Pt. Wants to go home.   Pain/Comfort:  Pain Rating 1: 0/10    Objective:     Communicated with: JUNITO Bean, prior to session.  Patient found up in chair with peripheral IV upon OT entry to room.    General Precautions: Standard, fall    Orthopedic Precautions:LLE weight bearing as tolerated, LLE posterior precautions  Braces: N/A  Respiratory Status: Room air     Occupational Performance:     Functional Mobility/Transfers:  Patient completed Sit <> Stand Transfer with modified independence  with  rolling walker   Patient completed  Shower Transfer Step Transfer technique with " modified independence with rolling walker using back-in method.   Patient completed car t/f w/RW while adhering to all posterior hip precautions with SBA.   Functional Mobility: Pt performed functional mobility for ~200' w/RW  w/mod I. Pt did not c/o SOB, dizziness, or lightheadedness, and she did not have any instances of LOB.     Treatment & Education:  Pt was able to recall 1/3 posterior hip precautions upon request, but pt demo ability to adhere to all precautions during functional activities. Pt received education for home safety, fall prevention, and energy conservation in preparation to return home. Pt verbalized understanding for all education.     Patient left up in chair with all lines intact, call button in reach, and daughter present    GOALS: Pt has met all therapy goals and is appropriate for d/c from occupational therapy. Let this serve as discharge note.   Multidisciplinary Problems       Occupational Therapy Goals       Not on file              Multidisciplinary Problems (Resolved)          Problem: Occupational Therapy    Goal Priority Disciplines Outcome Interventions   Occupational Therapy Goal   (Resolved)     OT, PT/OT Met    Description: Pt will perform LB dressing Mod I by d/c. MET  Pt will perform toileting Mod I by d/c. MET  Pt will perform toilet t/f Mod I by d/c. MET  Pt will perform shower t/f Mod I by d/c. MET  Pt will perform car t/f SBA in adherence to pxns by d/c. MET                       Time Tracking:     OT Date of Treatment: 12/01/23  OT Start Time: 1000  OT Stop Time: 1025  OT Total Time (min): 25 min    Billable Minutes:Therapeutic Activity 25    OT/JOHAN: OT          12/1/2023

## 2023-12-01 NOTE — PLAN OF CARE
Recvd notification from [facility] rehab ( [place])-- insurance denied inpt rehab after P2P.     O2 dc'd. O2 sat 90% on RA.     F/u with pt -- std she borrowed RW & BSC. Requesting to proceed with outpt therapy @ [facility].     Referral faxed to [facility] [place].     Dr Ball cleared for discharge.

## 2023-12-01 NOTE — PLAN OF CARE
Problem: Physical Therapy  Goal: Physical Therapy Goal  Description: Pt will improve functional independence by performing:    Bed mobility: SBA  Sit to stand: SBA with rolling walker  Bed to chair t/f: SBA with Stand Step  with rolling walker  Ambulation x 200'  with SBA with rolling walker-met  1 Step (Curb): Min A  with rolling walker-met  3 Steps: Min A  with B HR-met  Independent with total hip HEP   Outcome: Ongoing, Progressing

## 2023-12-01 NOTE — NURSING
RX: scripts given to patient/family  Supplies: Coverlet, TEDs, ACE    Educated on post op instructions, home care, f/u, meds., therapy, wound care, complication prevention, when to seek medical attention, ect. See AVS for specifics.     Questions/concerns addressed. Stable and ready for discharge.

## 2023-12-01 NOTE — PT/OT/SLP PROGRESS
Physical Therapy Treatment    Patient Name:  Nel Recinos   MRN:  [id number]    Recommendations:     Discharge Recommendations: High Intensity Therapy  Discharge Equipment Recommendations: walker, rolling  Barriers to discharge: None    Assessment:     Nel Recinos is a 73 y.o. female admitted with a medical diagnosis of Closed displaced fracture of neck of left femur.  She presents with the following impairments/functional limitations: impaired endurance, weakness, impaired functional mobility, decreased lower extremity function, pain, decreased ROM, edema, orthopedic precautions .    Rehab Prognosis: Good; patient would benefit from acute skilled PT services to address these deficits and reach maximum level of function.    Recent Surgery: Procedure(s) (LRB):  ROBOTIC ARTHROPLASTY, HIP, TOTAL, POSTERIOR APPROACH (Left) 4 Days Post-Op    Plan:     During this hospitalization, patient to be seen BID to address the identified rehab impairments via gait training, therapeutic activities, therapeutic exercises and progress toward the following goals:    Plan of Care Expires:  12/04/23    Subjective     Chief Complaint: n/a  Patient/Family Comments/goals: n/a  Pain/Comfort:  Pain Rating 1: 3/10  Location - Side 1: Left      Objective:     Communicated with rn prior to session.  Patient found up in chair with   upon PT entry to room.     General Precautions: Standard, fall  Orthopedic Precautions: RLE weight bearing as tolerated, RLE posterior precautions  Braces:    Respiratory Status: Room air     Functional Mobility:  Transfers:     Sit to Stand:  contact guard assistance with rolling walker  Bed to Chair: contact guard assistance with  rolling walker  using  Step Transfer  Gait: pt amb 200ft w/rw and cga for safety. Pt amb with slow gait speed.   Pt ascended/descended a 4 inch curb with supervision/touching assist using RW.       Patient left up in chair with all lines intact and call button in reach..    GOALS:    Multidisciplinary Problems       Physical Therapy Goals          Problem: Physical Therapy    Goal Priority Disciplines Outcome Goal Variances Interventions   Physical Therapy Goal     PT, PT/OT Ongoing, Progressing     Description: Pt will improve functional independence by performing:    Bed mobility: SBA  Sit to stand: SBA with rolling walker  Bed to chair t/f: SBA with Stand Step  with rolling walker  Ambulation x 200'  with SBA with rolling walker-met  1 Step (Curb): Min A  with rolling walker-met  3 Steps: Min A  with B HR-met  Independent with total hip HEP                        Time Tracking:     PT Received On:    PT Start Time: 1301     PT Stop Time: 1324  PT Total Time (min): 23 min     Billable Minutes: Therapeutic Activity 23    Treatment Type: Treatment  PT/PTA: PTA     Number of PTA visits since last PT visit: 3     12/01/2023

## 2023-12-01 NOTE — PLAN OF CARE
Problem: Physical Therapy  Goal: Physical Therapy Goal  Description: Pt will improve functional independence by performing:    Bed mobility: SBA  Sit to stand: SBA with rolling walker  Bed to chair t/f: SBA with Stand Step  with rolling walker  Ambulation x 200'  with SBA with rolling walker-met  1 Step (Curb): Min A  with rolling walker-met  3 Steps: Min A  with B HR-met  Independent with total hip HEP   12/1/2023 1346 by Deepika Villalobos, JOSE  Outcome: Ongoing, Progressing

## 2023-12-01 NOTE — PLAN OF CARE
Problem: Occupational Therapy  Goal: Occupational Therapy Goal  Description:   Pt will perform LB dressing Mod I by d/c. MET  Pt will perform toileting Mod I by d/c. MET  Pt will perform toilet t/f Mod I by d/c. MET  Pt will perform shower t/f Mod I by d/c. MET  Pt will perform car t/f SBA in adherence to pxns by d/c. MET  Outcome: Met

## 2023-12-01 NOTE — PT/OT/SLP PROGRESS
Physical Therapy Treatment    Patient Name:  Nel Recinos   MRN:  [id number]    Recommendations:     Discharge Recommendations: High Intensity Therapy  Discharge Equipment Recommendations: walker, rolling  Barriers to discharge: None    Assessment:     Nel Recinos is a 73 y.o. female admitted with a medical diagnosis of Closed displaced fracture of neck of left femur.  She presents with the following impairments/functional limitations: impaired endurance, weakness, impaired functional mobility, decreased lower extremity function, pain, decreased ROM, edema, orthopedic precautions .    Rehab Prognosis: Good; patient would benefit from acute skilled PT services to address these deficits and reach maximum level of function.    Recent Surgery: Procedure(s) (LRB):  ROBOTIC ARTHROPLASTY, HIP, TOTAL, POSTERIOR APPROACH (Left) 4 Days Post-Op    Plan:     During this hospitalization, patient to be seen BID to address the identified rehab impairments via gait training, therapeutic activities, therapeutic exercises and progress toward the following goals:    Plan of Care Expires:  12/04/23    Subjective     Chief Complaint: pain   Patient/Family Comments/goals:   Pain/Comfort:  Pain Rating 1: 3/10  Location - Side 1: Left      Objective:     Communicated with rn prior to session.  Patient found up in chair with   upon PT entry to room.     General Precautions: Standard, fall  Orthopedic Precautions: RLE weight bearing as tolerated, RLE posterior precautions  Braces:    Respiratory Status: Nasal cannula, flow 1 L/min     Functional Mobility:  Transfers:     Sit to Stand:  stand by assistance with rolling walker  Bed to Chair: stand by assistance with  rolling walker  using  Step Transfer  Gait: pt amb 200ft w/rw and cga foor safety. Pt amb w/slow gait speed and step through gait speed.     Treatment & Education:  Pt preformed seated ex 10x.     Patient left up in chair with all lines intact and call button in  reach..    GOALS:   Multidisciplinary Problems       Physical Therapy Goals          Problem: Physical Therapy    Goal Priority Disciplines Outcome Goal Variances Interventions   Physical Therapy Goal     PT, PT/OT Ongoing, Progressing     Description: Pt will improve functional independence by performing:    Bed mobility: SBA  Sit to stand: SBA with rolling walker  Bed to chair t/f: SBA with Stand Step  with rolling walker  Ambulation x 200'  with SBA with rolling walker-met  1 Step (Curb): Min A  with rolling walker-met  3 Steps: Min A  with B HR-met  Independent with total hip HEP                        Time Tracking:     PT Received On:    PT Start Time: 0833     PT Stop Time: 0856  PT Total Time (min): 23 min     Billable Minutes: Gait Training 13 and Therapeutic Exercise 10    Treatment Type: Treatment  PT/PTA: PTA     Number of PTA visits since last PT visit: 3     12/01/2023

## 2023-12-01 NOTE — DISCHARGE SUMMARY
[facility]   Hospital Medicine Discharge Summary    Admit Date: 11/25/2023  Discharge Date and Time: 12/1/20232:24 PM  Admitting Physician: [unfilled]  Discharging Physician: Fito Ball MD.  Primary Care Physician: Priyank Eubanks Jr.  Consults (From admission, onward)          Status Ordering Provider     IP consult case management/social work  Once        Provider:  (Not yet assigned)    Acknowledged KIZZY WATERS     Inpatient consult to Respiratory Care  Once        Provider:  (Not yet assigned)    Acknowledged KIZZY WATERS     Inpatient consult to Orthopedics  Once        Provider:  Roverto Shaw MD    Acknowledged KIZZY WATERS                 Discharge Diagnoses:  Left IT femur fracture (closed/displaced)/left Fer total hip arthroplasty 11/27  Afib on Eliquis (last dose 11/24 PM)  Bradycardia -resolved  Hypoxia -resolved  Hypotension -resolved  Hx of asthma        HPI  73 year old female with a history that includes A fib on Eliquis, presented to the ED 11/25 after a trip and fall, landing on her left hip.  Patient notes home being renovated at present and she tripped over the [facility] on the floor. Denied hitting her head or LOC.   Evaluation in ED included x-ray noting subcapital left femoral neck fracture.   Orthopedics was consulted and patient was admitted to Hospital Medicine services.       Hospital Course:   The pt had left hip surgery on 11/27/23 by Dr. Waters. She had an episode of bradycardia and hypotension on 11/29 so her Sotalol dose was decreased from 120mg to 80mg BID with improvement in both issues. Her afib remained rate control on the new dosage. She required 2L NC postoperatively but she has been off oxygen since yesterday. Her sats drop into the upper 80s at times on room air with exertion but she is asymptomatic. Her sats are 91-95% at rest. She has no respiratory complaints. She is doing well enough for dc home  with home health from therapy standpoint. She agrees with this plan.      Pt was seen and examined on the day of discharge.    Vitals:  VITAL SIGNS: 24 HRS MIN & MAX LAST   Temp  Min: 97.9 °F (36.6 °C)  Max: 99 °F (37.2 °C) 97.9 °F (36.6 °C)   BP  Min: 118/62  Max: 145/80 118/62   Pulse  Min: 60  Max: 79  62   Resp  Min: 18  Max: 18 18   SpO2  Min: 94 %  Max: 99 % 95 %       Physical Exam:  GENERAL: Awake and in NAD  LUNGS: CTA B/L, no wheezing or rhonchi  CVS: Normal rate, irreg rhythm, no murmurs  GI/: Soft, NT, bowel sounds positive.  EXTREMITIES: s/p left hip surgery  NEURO: AAOx3  PSYCH: Calm and Cooperative      Procedures Performed:     OPERATIVE REPORT        Patient: Nel Recinos   : 1950    MRN: [id number]  Date: 2023        Surgeon: Isidoro Waters MD  Assistant: none  Preoperative Diagnosis:  Left femoral neck fracture  Postoperative Diagnosis: Left femoral neck fracture  Procedure:  Left Fer total hip arthroplasty (17840)       Significant Diagnostic Studies: See Full reports for all details    Recent Labs   Lab 23  0519 23  0543 23  0520   WBC 7.88 7.34 7.21   RBC 3.08* 2.89* 2.99*   HGB 9.4* 8.9* 9.1*   HCT 28.3* 26.5* 27.6*   MCV 91.9 91.7 92.3   MCH 30.5 30.8 30.4   MCHC 33.2 33.6 33.0   RDW 13.0 12.8 12.8    137 177   MPV 9.8 10.2 10.4       Recent Labs   Lab 23  1608 23  0357 23  0519 23  0543 23  0520    140 139 138 142   K 4.1 4.1 3.6 3.4* 3.6   CO2 24 23 27 27 28   BUN 15.9 13.1 14.6 12.0 11.5   CREATININE 0.70 0.72 0.73 0.65 0.65   CALCIUM 9.2 9.0 8.6 8.6 9.2   MG  --  1.80  --   --  1.90   ALBUMIN 4.1 3.7  --   --   --    ALKPHOS 46 39*  --   --   --    ALT 15 14  --   --   --    AST 19 18  --   --   --    BILITOT 0.9 0.9  --   --   --         Microbiology Results (last 7 days)       ** No results found for the last 168 hours. **             X-Ray Hip 1 View Left (with Pelvis when performed)  Narrative:  EXAMINATION:  XR HIP 1 VIEW LEFT (WITH PELVIS WHEN PERFORMED)    CLINICAL HISTORY:  post op;  Unspecified intracapsular fracture of left femur, initial encounter for closed fracture    COMPARISON:  25 November 2023    FINDINGS:  Frontal image of the left hip demonstrates expected changes of interval total left hip arthroplasty.  Impression: As above.    Electronically signed by: James Green  Date:    11/27/2023  Time:    17:10  CT Hip w/o Contrast Left w/Peter Protocol  EXAMINATION  CT HIP WITHOUT CONTRAST LEFT W/PETER PROTOCOL    CLINICAL HISTORY  fracture; Fracture of unspecified part of neck of left femur, initial encounter for closed fracture    TECHNIQUE  Helical-acquisition CT images of the lower extremities were obtained without intravascular iodinated contrast media, utilizing preoperative robotic protocol.    COMPARISON  Radiographs dated 25 November 2023.    FINDINGS  Acute left femoral neck fracture with anterior apex angulation is again appreciated.  No other convincing acute osseous disruption is identified.  Visualized joints are congruent.  There are degenerative changes throughout the bony pelvis and both knees.  No destructive process or expansile bony lesion is visualized.    Hurst catheter is present within the urinary bladder.  No acute abnormality is appreciated within the pelvis and partially visualized lower abdomen.  There are scattered aortoiliac mural calcifications.    IMPRESSION  1. Limited study obtained for presurgical planning purposes.  2. Similar appearance of acute left femoral neck fracture.  3. Additional chronic secondary details discussed above.    RADIATION DOSE:  Automated tube current modulation and/or weight-based exposure dosing is utilized, when appropriate, to reach lowest reasonably achievable exposure rate.    DLP: 561 mGy*cm    Electronically signed by: Gianfranco Victor  Date:    11/27/2023  Time:    09:28         Medication List        START taking these medications       acetaminophen 500 MG tablet  Commonly known as: TYLENOL  Take 1 tablet (500 mg total) by mouth every 4 (four) hours. for 10 days     doxycycline 100 MG Cap  Commonly known as: VIBRAMYCIN  Take 1 capsule (100 mg total) by mouth every 12 (twelve) hours. for 14 days     traMADoL 50 mg tablet  Commonly known as: ULTRAM  Take 1 tablet (50 mg total) by mouth every 4 (four) hours as needed (as needed for pain score 1-10. give first if patient is tolerating oral meds).            CHANGE how you take these medications      sotaloL 80 MG tablet  Commonly known as: BETAPACE  Take 1 tablet (80 mg total) by mouth 2 (two) times daily.  What changed:   medication strength  how much to take            CONTINUE taking these medications      ELIQUIS 5 mg Tab  Generic drug: apixaban     meloxicam 15 MG tablet  Commonly known as: MOBIC     pantoprazole 40 MG tablet  Commonly known as: PROTONIX     rosuvastatin 20 MG tablet  Commonly known as: CRESTOR     sertraline 50 MG tablet  Commonly known as: ZOLOFT               Where to Get Your Medications        You can get these medications from any pharmacy    Bring a paper prescription for each of these medications  doxycycline 100 MG Cap  sotaloL 80 MG tablet  traMADoL 50 mg tablet       Information about where to get these medications is not yet available    Ask your nurse or doctor about these medications  acetaminophen 500 MG tablet          Explained in detail to the patient the discharge plan, medications, and follow-up visits. Pt understands and agrees with the treatment plan.  Discharge Disposition: Home-Health Care [facility]  Discharged Condition: stable  Diet-   Dietary Orders (From admission, onward)       Start     Ordered    11/30/23 0912  Diet Adult Regular  Diet effective now        Comments: Honor pt food requests as available. Pt reports not receiving correct orders.    11/30/23 0912                   Medications Per [place] med rec  Activities as tolerated   Follow-up Information        [facility].    Specialty: Physical Therapy  Why: This is the outpatient therapy facility. They will contact pt with appt date & time. Call if you have questions or concerns.  Contact information:  [address]  [address]  [facility] [address]  [phone]               Isidoro Waters MD. Go on 12/12/2023.    Specialty: Orthopedic Surgery  Why: Ortho follow up appt on Tuesday 12/12/23 @ 8:00am w/ Pablito (Dr Waters's Phy Assistant)  Contact information:  [address]  [address]  [facility] [address]  [phone]               Priyank Eubanks Jr. Follow up in 1 week(s).    Specialty: Internal Medicine  Why: Acute hospitalization followup. Pt with boderline O2 sats consider pulmonary function test vs pulmonary eval. BP and Afib follow up. Her home sotalol dose decreased whil hospitalized.  Contact information:  [address] [facility] [address]  [phone]                           For further questions contact hospitalist office.    Discharge time >30 minutes    For worsening symptoms, chest pain, shortness of breath, increased abdominal pain, high grade fever, stroke or stroke like symptoms, immediately go to the nearest Emergency Room or call 911 as soon as possible.      Fito Irizarry M.D, on 12/1/2023. at 2:24 PM.

## 2023-12-02 LAB — VIEW PATHOLOGY REPORT (RELIAPATH): NORMAL

## 2023-12-05 ENCOUNTER — TELEPHONE (OUTPATIENT)
Dept: ORTHOPEDICS | Facility: CLINIC | Age: 73
End: 2023-12-05
Payer: MEDICARE

## 2023-12-05 DIAGNOSIS — Z47.1 AFTERCARE FOLLOWING LEFT HIP JOINT REPLACEMENT SURGERY: Primary | ICD-10-CM

## 2023-12-05 DIAGNOSIS — Z96.642 AFTERCARE FOLLOWING LEFT HIP JOINT REPLACEMENT SURGERY: Primary | ICD-10-CM

## 2023-12-05 RX ORDER — TRAMADOL HYDROCHLORIDE 50 MG/1
50 TABLET ORAL EVERY 6 HOURS PRN
Qty: 28 TABLET | Refills: 0 | Status: SHIPPED | OUTPATIENT
Start: 2023-12-05 | End: 2023-12-12

## 2023-12-05 NOTE — TELEPHONE ENCOUNTER
Patient sent in request for refill of tramadol. Spoke with Nitza who was able to get script sent to [facility] pharmacy in [place].         Called patient and advised of this. Pt verbalized understanding and will call with any questions or concerns.

## 2023-12-12 ENCOUNTER — OFFICE VISIT (OUTPATIENT)
Dept: ORTHOPEDICS | Facility: CLINIC | Age: 73
End: 2023-12-12
Payer: MEDICARE

## 2023-12-12 ENCOUNTER — HOSPITAL ENCOUNTER (OUTPATIENT)
Dept: RADIOLOGY | Facility: CLINIC | Age: 73
Discharge: HOME | End: 2023-12-12
Attending: ORTHOPAEDIC SURGERY
Payer: MEDICARE

## 2023-12-12 VITALS
DIASTOLIC BLOOD PRESSURE: 69 MMHG | WEIGHT: 144.63 LBS | HEART RATE: 65 BPM | SYSTOLIC BLOOD PRESSURE: 139 MMHG | HEIGHT: 64 IN | BODY MASS INDEX: 24.69 KG/M2

## 2023-12-12 DIAGNOSIS — Z96.642 AFTERCARE FOLLOWING LEFT HIP JOINT REPLACEMENT SURGERY: ICD-10-CM

## 2023-12-12 DIAGNOSIS — Z96.642 AFTERCARE FOLLOWING LEFT HIP JOINT REPLACEMENT SURGERY: Primary | ICD-10-CM

## 2023-12-12 DIAGNOSIS — Z47.1 AFTERCARE FOLLOWING LEFT HIP JOINT REPLACEMENT SURGERY: Primary | ICD-10-CM

## 2023-12-12 DIAGNOSIS — Z47.1 AFTERCARE FOLLOWING LEFT HIP JOINT REPLACEMENT SURGERY: ICD-10-CM

## 2023-12-12 PROCEDURE — 1159F PR MEDICATION LIST DOCUMENTED IN MEDICAL RECORD: ICD-10-PCS | Mod: CPTII,,, | Performed by: ORTHOPAEDIC SURGERY

## 2023-12-12 PROCEDURE — 1101F PT FALLS ASSESS-DOCD LE1/YR: CPT | Mod: CPTII,,, | Performed by: ORTHOPAEDIC SURGERY

## 2023-12-12 PROCEDURE — 3288F PR FALLS RISK ASSESSMENT DOCUMENTED: ICD-10-PCS | Mod: CPTII,,, | Performed by: ORTHOPAEDIC SURGERY

## 2023-12-12 PROCEDURE — 73502 X-RAY EXAM HIP UNI 2-3 VIEWS: CPT | Mod: LT,,, | Performed by: ORTHOPAEDIC SURGERY

## 2023-12-12 PROCEDURE — 3075F SYST BP GE 130 - 139MM HG: CPT | Mod: CPTII,,, | Performed by: ORTHOPAEDIC SURGERY

## 2023-12-12 PROCEDURE — 99024 PR POST-OP FOLLOW-UP VISIT: ICD-10-PCS | Mod: ,,, | Performed by: ORTHOPAEDIC SURGERY

## 2023-12-12 PROCEDURE — 1159F MED LIST DOCD IN RCRD: CPT | Mod: CPTII,,, | Performed by: ORTHOPAEDIC SURGERY

## 2023-12-12 PROCEDURE — 3078F DIAST BP <80 MM HG: CPT | Mod: CPTII,,, | Performed by: ORTHOPAEDIC SURGERY

## 2023-12-12 PROCEDURE — 1101F PR PT FALLS ASSESS DOC 0-1 FALLS W/OUT INJ PAST YR: ICD-10-PCS | Mod: CPTII,,, | Performed by: ORTHOPAEDIC SURGERY

## 2023-12-12 PROCEDURE — 73502 XR HIP WITH PELVIS WHEN PERFORMED, 2 OR 3 VIEWS LEFT: ICD-10-PCS | Mod: LT,,, | Performed by: ORTHOPAEDIC SURGERY

## 2023-12-12 PROCEDURE — 99024 POSTOP FOLLOW-UP VISIT: CPT | Mod: ,,, | Performed by: ORTHOPAEDIC SURGERY

## 2023-12-12 PROCEDURE — 1125F PR PAIN SEVERITY QUANTIFIED, PAIN PRESENT: ICD-10-PCS | Mod: CPTII,,, | Performed by: ORTHOPAEDIC SURGERY

## 2023-12-12 PROCEDURE — 3078F PR MOST RECENT DIASTOLIC BLOOD PRESSURE < 80 MM HG: ICD-10-PCS | Mod: CPTII,,, | Performed by: ORTHOPAEDIC SURGERY

## 2023-12-12 PROCEDURE — 1125F AMNT PAIN NOTED PAIN PRSNT: CPT | Mod: CPTII,,, | Performed by: ORTHOPAEDIC SURGERY

## 2023-12-12 PROCEDURE — 3288F FALL RISK ASSESSMENT DOCD: CPT | Mod: CPTII,,, | Performed by: ORTHOPAEDIC SURGERY

## 2023-12-12 PROCEDURE — 3075F PR MOST RECENT SYSTOLIC BLOOD PRESS GE 130-139MM HG: ICD-10-PCS | Mod: CPTII,,, | Performed by: ORTHOPAEDIC SURGERY

## 2023-12-12 RX ORDER — TRAMADOL HYDROCHLORIDE 50 MG/1
50 TABLET ORAL EVERY 6 HOURS PRN
Qty: 28 TABLET | Refills: 0 | Status: SHIPPED | OUTPATIENT
Start: 2023-12-12 | End: 2023-12-19

## 2023-12-12 NOTE — PROGRESS NOTES
Chief Complaint:   Chief Complaint   Patient presents with    Left Hip - Post-op Evaluation     Pt states she is doing great after her surgery. Pt doesn't have any pain in the hip itself, but reports muscular pain in her thigh and knee. Pt states she is attending PT 2x a week.        History of present illness: Nel Recinos is a 73 y.o. female, presents to the clinic today 2 weeks status post left total hip arthroplasty due to fracture.  Doing well.  Ambulating with the assistance of a walker.  She has minimal pain although this has worsened with therapy.  Currently on tramadol and does need a refill of this.  In therapy several times a week for strengthening, range of motion, mobility.  Regards to her incision site is clean dry and intact with bandage.  No complaints of drainage or bleeding.  She states that she has returned to work she has an  and does somewhat from home at a desk.  We had like a work excuse.  Also patient was asking for a handicap tag.      Past Medical History:   Diagnosis Date    A-fib     Anxiety disorder, unspecified     Arthritis     High cholesterol     Osteoarthritis        Past Surgical History:   Procedure Laterality Date    CARPAL TUNNEL RELEASE Left     FOOT SURGERY      HAND SURGERY      hysterectomy      ROBOTIC ARTHROPLASTY,HIP,TOTAL,POSTERIOR APPROACH Left 11/27/2023    Procedure: ROBOTIC ARTHROPLASTY, HIP, TOTAL, POSTERIOR APPROACH;  Surgeon: Isidoro Waters MD;  Location: [facility];  Service: Orthopedics;  Laterality: Left;       Current Outpatient Medications   Medication Sig    doxycycline (VIBRAMYCIN) 100 MG Cap Take 1 capsule (100 mg total) by mouth every 12 (twelve) hours. for 14 days    ELIQUIS 5 mg Tab Take 5 mg by mouth 2 (two) times daily.    meloxicam (MOBIC) 15 MG tablet Take 15 mg by mouth.    pantoprazole (PROTONIX) 40 MG tablet Take 1 tablet by mouth every morning.    rosuvastatin (CRESTOR) 20 MG tablet Take 20 mg by mouth every evening.    sertraline  (ZOLOFT) 50 MG tablet Take 50 mg by mouth.    sotaloL (BETAPACE) 80 MG tablet Take 1 tablet (80 mg total) by mouth 2 (two) times daily.    traMADoL (ULTRAM) 50 mg tablet Take 1 tablet (50 mg total) by mouth every 6 (six) hours as needed for Pain.    traMADoL (ULTRAM) 50 mg tablet Take 1 tablet (50 mg total) by mouth every 6 (six) hours as needed for Pain.     No current facility-administered medications for this visit.       Review of patient's allergies indicates:   Allergen Reactions    Tomato     Weed pollen        Family History   Family history unknown: Yes       Social History     Socioeconomic History    Marital status:    Tobacco Use    Smoking status: Never    Smokeless tobacco: Never   Substance and Sexual Activity    Alcohol use: Yes     Alcohol/week: 2.0 standard drinks of alcohol     Types: 2 Glasses of wine per week     Comment: socially    Drug use: Never    Sexual activity: Yes     Partners: Male     Birth control/protection: See Surgical Hx           Review of Systems:    Denies fevers, chills, chest pain, shortness of breath. Comprehensive review of systems performed and otherwise negative except as noted in HPI      Physical Examination:    General: awake and alert, no acute distress, healthy appearing  Head and Neck: Head atraumatic/normocephalic. Moist MM  CV: brisk cap refill  Lungs: non-labored breathing, w/o cough or SOB  Skin: no rashes present, warm to touch  Neuro: sensation grossly intact distall     Vital Signs:    Vitals:    12/12/23 1343   BP: 139/69   Pulse: 65       Body mass index is 24.82 kg/m².    Left hip exam:    Left hip incision clean dry and intact. No erythema, drainage, or signs of infection  No swelling distally. No signs of DVT  Brisk cap refill left foot  Sensation intact distally to left foot  Positive FHL/EHL/gastrocsoleus/tib ant    X-rays: 3 views of the left hip reviewed. Patient's implants appear well fixed. No signs of loosening or subsidence noted.      Assessment:: post op s/p L OLENA due to fracture    Plan:  Patient presents to clinic today about 2 weeks status post surgery.  Her left hip is stable on exam x-rays today here in clinic.  Regards incision sites well healed and staples were discontinued.  Patient was educated she could shower without fully submerging x1 week.  Tramadol was refilled and sent to her pharmacy.  Patient was given a work excuse to return to work with restrictions.  Also handicap form was given to patient today here in clinic.  She is to continue physical therapy for strengthening, range of motion, mobility.  Have her follow up in 1 month with x-rays to reassess her hip.  Patient states understanding and agrees with plan of care.    The above findings, diagnostics, and treatment plan were discussed with Dr. Isidoro Waters who is in agreement with the plan of care.      This note was created using Dragon Inside voice recognition software that occasionally misinterpreted phrases or words.    Consult note is delivered via Epic messaging service.

## 2023-12-12 NOTE — LETTER
December 12, 2023       Or[facility]  [address], [address]  [place] [address]  Phone: [phone]  Fax: [phone]       Patient: Nel Recinos   YOB: 1950  Date of Visit: 12/12/2023    To Whom It May Concern:    venancio Recinos  was at [facility] on 12/12/2023. The patient may return to work/school on 12/11/23 with no restrictions as tolerated. If you have any questions or concerns, or if I can be of further assistance, please do not hesitate to contact me.    Sincerely,    Isidoro Waters MD // APOLINAR Tyler  bg

## 2024-01-23 ENCOUNTER — HOSPITAL ENCOUNTER (OUTPATIENT)
Dept: RADIOLOGY | Facility: CLINIC | Age: 74
Discharge: HOME | End: 2024-01-23
Attending: ORTHOPAEDIC SURGERY
Payer: MEDICARE

## 2024-01-23 ENCOUNTER — OFFICE VISIT (OUTPATIENT)
Dept: ORTHOPEDICS | Facility: CLINIC | Age: 74
End: 2024-01-23
Payer: MEDICARE

## 2024-01-23 VITALS
DIASTOLIC BLOOD PRESSURE: 72 MMHG | SYSTOLIC BLOOD PRESSURE: 132 MMHG | HEIGHT: 64 IN | WEIGHT: 146.19 LBS | HEART RATE: 68 BPM | BODY MASS INDEX: 24.96 KG/M2

## 2024-01-23 DIAGNOSIS — Z96.642 AFTERCARE FOLLOWING LEFT HIP JOINT REPLACEMENT SURGERY: Primary | ICD-10-CM

## 2024-01-23 DIAGNOSIS — Z47.1 AFTERCARE FOLLOWING LEFT HIP JOINT REPLACEMENT SURGERY: Primary | ICD-10-CM

## 2024-01-23 DIAGNOSIS — Z96.642 AFTERCARE FOLLOWING LEFT HIP JOINT REPLACEMENT SURGERY: ICD-10-CM

## 2024-01-23 DIAGNOSIS — Z47.1 AFTERCARE FOLLOWING LEFT HIP JOINT REPLACEMENT SURGERY: ICD-10-CM

## 2024-01-23 PROCEDURE — 73502 X-RAY EXAM HIP UNI 2-3 VIEWS: CPT | Mod: LT,,, | Performed by: ORTHOPAEDIC SURGERY

## 2024-01-23 PROCEDURE — 3075F SYST BP GE 130 - 139MM HG: CPT | Mod: CPTII,,, | Performed by: NURSE PRACTITIONER

## 2024-01-23 PROCEDURE — 99024 POSTOP FOLLOW-UP VISIT: CPT | Mod: ,,, | Performed by: NURSE PRACTITIONER

## 2024-01-23 PROCEDURE — 3078F DIAST BP <80 MM HG: CPT | Mod: CPTII,,, | Performed by: NURSE PRACTITIONER

## 2024-01-23 PROCEDURE — 1159F MED LIST DOCD IN RCRD: CPT | Mod: CPTII,,, | Performed by: NURSE PRACTITIONER

## 2024-01-23 NOTE — PROGRESS NOTES
Chief Complaint:   Chief Complaint   Patient presents with    Follow-up     F/U Left OLENA 11/27/23 gl 2/25/24, Patient is doing great so far has some bad and good days takes OTC medication for the pain, last PT note in media 1/22/24       History of present illness: Nel Recinos is a 73 y.o. female, presents to clinic today in regards to her left hip.  Patient is about 8 weeks out from surgery.  Overall she is doing very well.  Ambulates without any assistance.  Has continued physical therapy although she feels as though she has reached a point where she is gotten enough from this.  Denies any pain or discomfort.  Happy with her progress thus far.      Past Medical History:   Diagnosis Date    A-fib     Anxiety disorder, unspecified     Arthritis     High cholesterol     Osteoarthritis        Past Surgical History:   Procedure Laterality Date    APPENDECTOMY  1979    CARPAL TUNNEL RELEASE Left     FOOT SURGERY      HAND SURGERY      hysterectomy      HYSTERECTOMY  2013    JOINT REPLACEMENT  2017    ROBOTIC ARTHROPLASTY,HIP,TOTAL,POSTERIOR APPROACH Left 11/27/2023    Procedure: ROBOTIC ARTHROPLASTY, HIP, TOTAL, POSTERIOR APPROACH;  Surgeon: Isidoro Waters MD;  Location: [facility];  Service: Orthopedics;  Laterality: Left;    TUBAL LIGATION  1982       Current Outpatient Medications   Medication Sig    ELIQUIS 5 mg Tab Take 5 mg by mouth 2 (two) times daily.    meloxicam (MOBIC) 15 MG tablet Take 15 mg by mouth.    pantoprazole (PROTONIX) 40 MG tablet Take 1 tablet by mouth every morning.    rosuvastatin (CRESTOR) 20 MG tablet Take 20 mg by mouth every evening.    sertraline (ZOLOFT) 50 MG tablet Take 50 mg by mouth.    sotaloL (BETAPACE) 80 MG tablet Take 1 tablet (80 mg total) by mouth 2 (two) times daily.     No current facility-administered medications for this visit.       Review of patient's allergies indicates:   Allergen Reactions    Tomato     Weed pollen        Family History   Problem Relation Age of  Onset    Alcohol abuse Mother     Alcohol abuse Sister     Arthritis Sister     Stroke Sister     Cancer Sister        Social History     Socioeconomic History    Marital status:    Tobacco Use    Smoking status: Never    Smokeless tobacco: Never   Substance and Sexual Activity    Alcohol use: Not Currently     Comment: socially    Drug use: Never    Sexual activity: Not Currently     Partners: Male     Birth control/protection: See Surgical Hx           Review of Systems:    Denies fevers, chills, chest pain, shortness of breath. Comprehensive review of systems performed and otherwise negative except as noted in HPI      Physical Examination:    General: awake and alert, no acute distress, healthy appearing  Head and Neck: Head atraumatic/normocephalic. Moist MM  CV: brisk cap refill  Lungs: non-labored breathing, w/o cough or SOB  Skin: no rashes present, warm to touch  Neuro: sensation grossly intact distall     Vital Signs:    Vitals:    01/23/24 1314   BP: 132/72   Pulse: 68       Body mass index is 25.1 kg/m².    Left hip exam:    Left hip incision clean dry and intact. No erythema, drainage, or signs of infection  No swelling distally. No signs of DVT  Brisk cap refill left foot  Sensation intact distally to left foot  Positive FHL/EHL/gastrocsoleus/tib ant    X-rays: 3 views of the left hip reviewed. Patient's implants appear well fixed. No signs of loosening or subsidence noted.     Assessment:: post op s/p L OLENA    Plan:  Patient presents to clinic today about 8 weeks out from surgery.  Overall she is doing well.  Her hip is stable on exam and x-rays.  Denies any pain or discomfort.  Educated that she has got no restrictions at this point and can start coming off hip precautions.  She does state understanding.  In regards to physical therapy at this time there is no need for any further visits unless she would like to continue going.  Have her follow up in clinic in 2 months to reassess her hip.   No x-rays needed at this time.  Patient states understanding and agrees with plan of care.    This note was created using Qoostar voice recognition software that occasionally misinterpreted phrases or words.    Consult note is delivered via Epic messaging service.

## 2024-03-08 ENCOUNTER — HOSPITAL ENCOUNTER (OUTPATIENT)
Dept: RADIOLOGY | Facility: CLINIC | Age: 74
Discharge: HOME | End: 2024-03-08
Attending: ORTHOPAEDIC SURGERY
Payer: MEDICARE

## 2024-03-08 ENCOUNTER — OFFICE VISIT (OUTPATIENT)
Dept: ORTHOPEDICS | Facility: CLINIC | Age: 74
End: 2024-03-08
Payer: MEDICARE

## 2024-03-08 VITALS
HEART RATE: 61 BPM | WEIGHT: 144 LBS | SYSTOLIC BLOOD PRESSURE: 123 MMHG | HEIGHT: 64 IN | BODY MASS INDEX: 24.59 KG/M2 | DIASTOLIC BLOOD PRESSURE: 76 MMHG

## 2024-03-08 DIAGNOSIS — M25.561 ACUTE PAIN OF RIGHT KNEE: ICD-10-CM

## 2024-03-08 DIAGNOSIS — Z96.642 AFTERCARE FOLLOWING LEFT HIP JOINT REPLACEMENT SURGERY: Primary | ICD-10-CM

## 2024-03-08 DIAGNOSIS — Z47.1 AFTERCARE FOLLOWING LEFT HIP JOINT REPLACEMENT SURGERY: Primary | ICD-10-CM

## 2024-03-08 PROCEDURE — 99214 OFFICE O/P EST MOD 30 MIN: CPT | Mod: ,,, | Performed by: ORTHOPAEDIC SURGERY

## 2024-03-08 PROCEDURE — 3008F BODY MASS INDEX DOCD: CPT | Mod: CPTII,,, | Performed by: ORTHOPAEDIC SURGERY

## 2024-03-08 PROCEDURE — 73564 X-RAY EXAM KNEE 4 OR MORE: CPT | Mod: 50,,, | Performed by: ORTHOPAEDIC SURGERY

## 2024-03-08 PROCEDURE — 3288F FALL RISK ASSESSMENT DOCD: CPT | Mod: CPTII,,, | Performed by: ORTHOPAEDIC SURGERY

## 2024-03-08 PROCEDURE — 1100F PTFALLS ASSESS-DOCD GE2>/YR: CPT | Mod: CPTII,,, | Performed by: ORTHOPAEDIC SURGERY

## 2024-03-08 PROCEDURE — 3074F SYST BP LT 130 MM HG: CPT | Mod: CPTII,,, | Performed by: ORTHOPAEDIC SURGERY

## 2024-03-08 PROCEDURE — 3078F DIAST BP <80 MM HG: CPT | Mod: CPTII,,, | Performed by: ORTHOPAEDIC SURGERY

## 2024-03-08 PROCEDURE — 1159F MED LIST DOCD IN RCRD: CPT | Mod: CPTII,,, | Performed by: ORTHOPAEDIC SURGERY

## 2024-03-08 RX ORDER — MONTELUKAST SODIUM 10 MG/1
10 TABLET ORAL
COMMUNITY
Start: 2024-02-11

## 2024-03-08 NOTE — PROGRESS NOTES
Chief Complaint:   Chief Complaint   Patient presents with    Right Knee - Pain     Right knee pain starting a few weeks ago. Ambulates without assistive devices. Reports pain worse with kneeling and bending. Denies prior injury or sx to right knee.        History of present illness:  71-year-old female presents today for evaluation of right knee pain.  Patient states she has had knee pain for last 3 or 4 weeks.  Notes some swelling of the anterior knee.  Worse with activity.  Improved by rest.  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  It was worsened recently.  She has tried no assistive device thus far.    Past Medical History:   Diagnosis Date    A-fib     Anxiety disorder, unspecified     Arthritis     High cholesterol     Osteoarthritis        Past Surgical History:   Procedure Laterality Date    APPENDECTOMY  1979    CARPAL TUNNEL RELEASE Left     FOOT SURGERY      HAND SURGERY      hysterectomy      HYSTERECTOMY  2013    JOINT REPLACEMENT  2017    ROBOTIC ARTHROPLASTY,HIP,TOTAL,POSTERIOR APPROACH Left 11/27/2023    Procedure: ROBOTIC ARTHROPLASTY, HIP, TOTAL, POSTERIOR APPROACH;  Surgeon: Isidoro Waters MD;  Location: [facility];  Service: Orthopedics;  Laterality: Left;    TUBAL LIGATION  1982       Current Outpatient Medications   Medication Sig    ELIQUIS 5 mg Tab Take 5 mg by mouth 2 (two) times daily.    meloxicam (MOBIC) 15 MG tablet Take 15 mg by mouth.    montelukast (SINGULAIR) 10 mg tablet Take 10 mg by mouth.    pantoprazole (PROTONIX) 40 MG tablet Take 1 tablet by mouth every morning.    rosuvastatin (CRESTOR) 20 MG tablet Take 20 mg by mouth every evening.    sertraline (ZOLOFT) 50 MG tablet Take 50 mg by mouth.    sotaloL (BETAPACE) 80 MG tablet Take 1 tablet (80 mg total) by mouth 2 (two) times daily.     No current facility-administered medications for this visit.       Review of patient's allergies indicates:   Allergen Reactions    Tomato     Weed pollen        Family  History   Problem Relation Age of Onset    Alcohol abuse Mother     Alcohol abuse Sister     Arthritis Sister     Stroke Sister     Cancer Sister        Social History     Socioeconomic History    Marital status:    Tobacco Use    Smoking status: Never    Smokeless tobacco: Never   Substance and Sexual Activity    Alcohol use: Not Currently     Comment: socially    Drug use: Never    Sexual activity: Not Currently     Partners: Male     Birth control/protection: See Surgical Hx           Review of Systems:    Constitution: Negative for chills, fever, and sweats.  Negative for unexplained weight loss.    HENT:  Negative for headaches and blurry vision.    Cardiovascular:Negative for chest pain or irregular heart beat. Negative for hypertension.    Respiratory:  Negative for cough and shortness of breath.    Gastrointestinal: Negative for abdominal pain, heartburn, melena, nausea, and vomitting.    Genitourinary:  Negative bladder incontinence and dysuria.    Musculoskeletal:  See HPI    Neurological: Negative for numbness.    Psychiatric/Behavioral: Negative for depression.  The patient is not nervous/anxious.      Endocrine: Negative for polyuria    Hematologic/Lymphatic: Negative for bleeding problem.  Does not bruise/bleed easily.    Skin: Negative for poor would healing and rash      Physical Examination:    Vital Signs:    Vitals:    03/08/24 0841   BP: 123/76   Pulse: 61       Body mass index is 24.72 kg/m².    General: No acute distress, alert and oriented, healthy appearing    HEENT: Head is atraumatic, mucous membranes are moist    Neck: Supples, no JVD    Cardiovascular: Palpable dorsalis pedis and posterior tibial pulses, regular rate and rhythm to those pulses    Lungs: Breathing non-labored    Skin: no rashes appreciated    Neurologic: Can flex and extend knees, ankles, and toes. Sensation is grossly intact    Right knee:  Patient with a 3 x 3 cm swelling of the anterior knee.  No significant  redness.  Patient with sterile bursitis.    X-rays:  Three views of the bilateral knees reviewed.  Patient with early arthritic change to bilateral knees.  Does have maintained joint space     Assessment::  Right knee prepatellar bursitis    Plan:  Discussed all treatment with the patient.  At this point, she has a bursitis although she is fairly asymptomatic.  We will plan to treat this with compression anti-inflammatories and she is going to refrain from kneeling without a pad.  We will plan to see her back in about 6 weeks.  We could do an injection of the bursa possible at point in time    This note was created using Zillow voice recognition software that occasionally misinterpreted phrases or words.    Consult note is delivered via Epic messaging service.

## 2024-05-07 ENCOUNTER — OFFICE VISIT (OUTPATIENT)
Dept: ORTHOPEDICS | Facility: CLINIC | Age: 74
End: 2024-05-07
Payer: MEDICARE

## 2024-05-07 VITALS
HEIGHT: 64 IN | SYSTOLIC BLOOD PRESSURE: 127 MMHG | WEIGHT: 143.94 LBS | BODY MASS INDEX: 24.57 KG/M2 | TEMPERATURE: 97 F | DIASTOLIC BLOOD PRESSURE: 77 MMHG | HEART RATE: 53 BPM

## 2024-05-07 DIAGNOSIS — M25.561 ACUTE PAIN OF RIGHT KNEE: Primary | ICD-10-CM

## 2024-05-07 PROCEDURE — 3288F FALL RISK ASSESSMENT DOCD: CPT | Mod: CPTII,,, | Performed by: NURSE PRACTITIONER

## 2024-05-07 PROCEDURE — 99213 OFFICE O/P EST LOW 20 MIN: CPT | Mod: ,,, | Performed by: NURSE PRACTITIONER

## 2024-05-07 PROCEDURE — 1101F PT FALLS ASSESS-DOCD LE1/YR: CPT | Mod: CPTII,,, | Performed by: NURSE PRACTITIONER

## 2024-05-07 PROCEDURE — 3077F SYST BP >= 140 MM HG: CPT | Mod: CPTII,,, | Performed by: NURSE PRACTITIONER

## 2024-05-07 PROCEDURE — 3008F BODY MASS INDEX DOCD: CPT | Mod: CPTII,,, | Performed by: NURSE PRACTITIONER

## 2024-05-07 PROCEDURE — 1159F MED LIST DOCD IN RCRD: CPT | Mod: CPTII,,, | Performed by: NURSE PRACTITIONER

## 2024-05-07 PROCEDURE — 3079F DIAST BP 80-89 MM HG: CPT | Mod: CPTII,,, | Performed by: NURSE PRACTITIONER

## 2024-05-07 RX ORDER — FLUTICASONE PROPIONATE 50 MCG
SPRAY, SUSPENSION (ML) NASAL
COMMUNITY
Start: 2024-03-27

## 2024-05-07 NOTE — PROGRESS NOTES
Chief Complaint:   Chief Complaint   Patient presents with    Left Hip - Follow-up     F/u for Lt OLENA on 11/27/23, denies pain, wbat, ambulating without assistance, has no other complaints at this time,     Right Knee - Pain     Right knee bursitis, states pain has decreased since last visit, wbat, ambulating without assistance,        History of present illness: Nel Recinos is a 73 y.o. female, presents to the clinic today in regards to her right knee and left hip.  Patient is about 6 months out from her left hip surgery not having any issues with this ambulating without any assistance.  Regards to the right knee she was having some bursitis.  States that this has gone down significantly.  Has been very careful with any type of kneeling.  Continue her anti-inflammatories.  No problems with it today here in clinic.  Overall doing well.    Past Medical History:   Diagnosis Date    A-fib     Anxiety disorder, unspecified     Arthritis     High cholesterol     Osteoarthritis        Past Surgical History:   Procedure Laterality Date    APPENDECTOMY  1979    CARPAL TUNNEL RELEASE Left     FOOT SURGERY      HAND SURGERY      hysterectomy      HYSTERECTOMY  2013    JOINT REPLACEMENT  2017    ROBOTIC ARTHROPLASTY,HIP,TOTAL,POSTERIOR APPROACH Left 11/27/2023    Procedure: ROBOTIC ARTHROPLASTY, HIP, TOTAL, POSTERIOR APPROACH;  Surgeon: Isidoro Waters MD;  Location: [facility];  Service: Orthopedics;  Laterality: Left;    TUBAL LIGATION  1982       Current Outpatient Medications   Medication Sig    ELIQUIS 5 mg Tab Take 5 mg by mouth 2 (two) times daily.    fluticasone propionate (FLONASE) 50 mcg/actuation nasal spray USE 2 SPRAYS IN EACH NOSTRIL EVERY MORNING    meloxicam (MOBIC) 15 MG tablet Take 15 mg by mouth.    montelukast (SINGULAIR) 10 mg tablet Take 10 mg by mouth.    pantoprazole (PROTONIX) 40 MG tablet Take 1 tablet by mouth every morning.    rosuvastatin (CRESTOR) 20 MG tablet Take 20 mg by mouth every evening.     sertraline (ZOLOFT) 50 MG tablet Take 50 mg by mouth.    sotaloL (BETAPACE) 80 MG tablet Take 1 tablet (80 mg total) by mouth 2 (two) times daily.     No current facility-administered medications for this visit.       Review of patient's allergies indicates:   Allergen Reactions    Tomato     Weed pollen        Family History   Problem Relation Name Age of Onset    Alcohol abuse Mother aurelia meyer     Alcohol abuse Sister toro bourne     Arthritis Sister toro bourne     Stroke Sister toro bourne     Cancer Sister lori hsieh        Social History     Socioeconomic History    Marital status:    Tobacco Use    Smoking status: Never    Smokeless tobacco: Never   Substance and Sexual Activity    Alcohol use: Not Currently     Comment: socially    Drug use: Never    Sexual activity: Not Currently     Partners: Male     Birth control/protection: See Surgical Hx           Review of Systems:    Denies fevers, chills, chest pain, shortness of breath. Comprehensive review of systems performed and otherwise negative except as noted in HPI     Physical Examination:    General: awake and alert, no acute distress, healthy appearing  Head and Neck: Head atraumatic/normocephalic. Moist MM  CV: brisk cap refill  Lungs: non-labored breathing, w/o cough or SOB  Skin: no rashes present, warm to touch  Neuro: sensation grossly intact distally       Vital Signs:    Vitals:    05/07/24 1538   BP: (!) 143/81   Pulse: (!) 51       Body mass index is 24.71 kg/m².    Right knee:  Skin warm, dry and intact.  No swelling.  Range of motion without any pain.  Sensation intact distally.       Assessment::  Status post left total hip and prepatellar bursitis of right knee    Plan:  Patient presents in regards to right knee and left hip.  The hip is doing well at 6 months out from surgery.  In regards to the right knee this has improved significantly.  No pain or discomfort.  No swelling.  She is to continue anti-inflammatories  as needed.  In regards to the hip we will see her back at her [place] with x-rays to reassess this.  Patient states understanding and agrees with plan of care.    This note was created using [employer] voice recognition software that occasionally misinterpreted phrases or words.    Consult note is delivered via Epic messaging service.

## 2024-09-13 ENCOUNTER — HOSPITAL ENCOUNTER (EMERGENCY)
Facility: HOSPITAL | Age: 74
Discharge: HOME | End: 2024-09-13
Attending: EMERGENCY MEDICINE
Payer: MEDICARE

## 2024-09-13 VITALS
SYSTOLIC BLOOD PRESSURE: 146 MMHG | BODY MASS INDEX: 23.22 KG/M2 | TEMPERATURE: 98 F | HEIGHT: 64 IN | HEART RATE: 67 BPM | OXYGEN SATURATION: 97 % | DIASTOLIC BLOOD PRESSURE: 72 MMHG | WEIGHT: 136 LBS | RESPIRATION RATE: 16 BRPM

## 2024-09-13 DIAGNOSIS — M54.42 ACUTE BILATERAL LOW BACK PAIN WITH LEFT-SIDED SCIATICA: Primary | ICD-10-CM

## 2024-09-13 PROCEDURE — 99284 EMERGENCY DEPT VISIT MOD MDM: CPT

## 2024-09-13 RX ORDER — TRAMADOL HYDROCHLORIDE 50 MG/1
50 TABLET ORAL EVERY 8 HOURS PRN
Qty: 12 TABLET | Refills: 0 | Status: SHIPPED | OUTPATIENT
Start: 2024-09-13

## 2024-09-13 RX ORDER — TIZANIDINE 4 MG/1
4 TABLET ORAL EVERY 8 HOURS PRN
Qty: 12 TABLET | Refills: 0 | Status: SHIPPED | OUTPATIENT
Start: 2024-09-13

## 2024-09-13 RX ORDER — LIDOCAINE 50 MG/G
1 PATCH TOPICAL DAILY
Qty: 10 PATCH | Refills: 0 | Status: SHIPPED | OUTPATIENT
Start: 2024-09-13

## 2024-09-13 NOTE — DISCHARGE INSTRUCTIONS
Thanks for letting us take care of you today!  It is our goal to give you courteous care and to keep you comfortable and informed, if you have any questions before you leave I will be happy to try and answer them.    Here is some advice after your visit:      Your visit in the emergency department is NOT definitive care - please follow-up with your primary care doctor and/or specialist within 1 week.  Please return if you have any worsening in your condition or if you have any other concerns.    If you were prescribed OPIATE PAIN MEDICATION - please understand of these medications can be addictive, you may fill less of the prescription was written for, you do not have to take the full prescription.  You may discard what you do not use.  Please seek help if you feel you are having problems with addiction.  Do not drive or operate heavy machinery if you are taking sedating medications.  Do not mix these medications with alcohol.      You have been prescribed Tizanidine for muscle spasms/pain. Please do not take this medication while working, drinking alcohol, swimming, or while driving/operating heavy machinery. This medication may cause drowsiness, dizziness, impair judgment, and reduce physical capabilities.You should not drive, operate heavy machinery, or make life changing decisions while taking this medication.

## 2024-09-13 NOTE — ED PROVIDER NOTES
Encounter Date: 9/13/2024       History     Chief Complaint   Patient presents with    Back Pain     Lower back pain and left hip/onset spontaneously 2 months ago and is ongoing. Evaluated by PCP with X-rays and were told they were negative. Referred to Orthopedist to which she has an appointment with next month. Denies any new injury. Ambulatory with steady gait. Takes Tramadol qPM with relief.      73 y.o. White female with a history of hypertension, OA, and atrial fibrillation presents to Emergency Department with a chief complaint of atraumatic lower back pain. Symptoms began several weeks ago and have been constant since onset. States she had COVID at the end of July 2024 and thinks she hurt her back from excessive coughing. 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 Patient has seen her PCP regarding symptoms and is scheduled to see spine specialist in October 2024. Pain radiates down her LLE. Symptoms are aggravated with palpation and there are no alleviating factors. The patient denies CP, SOB, fever, recent injury, incontinence, or saddle anesthesia. No other reported symptoms at this time      The history is provided by the patient. No  was used.   Back Pain   This is a recurrent problem. The current episode started several weeks ago. The problem occurs throughout the day. The problem has been unchanged. The pain is associated with falling. The pain is present in the lumbar spine and gluteal region. The pain radiates to the left leg. The pain is The same all the time. Stiffness is present All day. Pertinent negatives include no chest pain, no fever, no numbness, no abdominal pain, no abdominal swelling, no bowel incontinence, no perianal numbness, no bladder incontinence, no dysuria, no pelvic pain, no paresthesias, no paresis, no tingling and no weakness. Treatments tried: Tramadol.     Review of patient's allergies indicates:   Allergen Reactions     Tomato     Weed pollen      Past Medical History:   Diagnosis Date    A-fib     Anxiety disorder, unspecified     Arthritis     High cholesterol     Osteoarthritis      Past Surgical History:   Procedure Laterality Date    APPENDECTOMY  1979    CARPAL TUNNEL RELEASE Left     FOOT SURGERY      HAND SURGERY      hysterectomy      HYSTERECTOMY  2013    JOINT REPLACEMENT  2017    ROBOTIC ARTHROPLASTY,HIP,TOTAL,POSTERIOR APPROACH Left 11/27/2023    Procedure: ROBOTIC ARTHROPLASTY, HIP, TOTAL, POSTERIOR APPROACH;  Surgeon: Isidoro Waters MD;  Location: [facility];  Service: Orthopedics;  Laterality: Left;    TUBAL LIGATION  1982     Family History   Problem Relation Name Age of Onset    Alcohol abuse Mother aurelia meyer     Alcohol abuse Sister toro bourne     Arthritis Sister toro bourne     Stroke Sister toro bourne     Cancer Sister lori hsieh      Social History     Tobacco Use    Smoking status: Never    Smokeless tobacco: Never   Substance Use Topics    Alcohol use: Not Currently     Comment: socially    Drug use: Never     Review of Systems   Constitutional:  Negative for chills, fatigue and fever.   Eyes:  Negative for photophobia and visual disturbance.   Respiratory:  Negative for cough, shortness of breath and stridor.    Cardiovascular:  Negative for chest pain, palpitations and leg swelling.   Gastrointestinal:  Negative for abdominal pain, bowel incontinence, nausea and vomiting.   Genitourinary:  Negative for bladder incontinence, dysuria, flank pain, genital sores and pelvic pain.   Musculoskeletal:  Positive for back pain and myalgias. Negative for gait problem.   Neurological:  Negative for tingling, weakness, numbness and paresthesias.   All other systems reviewed and are negative.      Physical Exam     Initial Vitals [09/13/24 1716]   BP Pulse Resp Temp SpO2   (!) 146/72 67 16 98.2 °F (36.8 °C) 97 %      MAP       --         Physical Exam    Nursing note and vitals reviewed.  Constitutional:  She appears well-developed and well-nourished. She is not diaphoretic. She is cooperative.  Non-toxic appearance. No distress.   HENT:   Head: Normocephalic and atraumatic.   Right Ear: External ear normal.   Left Ear: External ear normal.   Nose: Nose normal.   Eyes: Conjunctivae and EOM are normal. Pupils are equal, round, and reactive to light.   Neck: Neck supple.   Normal range of motion.  Cardiovascular:  Normal rate, regular rhythm, S1 normal, S2 normal, normal heart sounds, intact distal pulses and normal pulses.           Pulmonary/Chest: Effort normal and breath sounds normal. No accessory muscle usage. No tachypnea. No respiratory distress. She has no decreased breath sounds. She has no wheezes. She has no rhonchi. She has no rales. She exhibits no tenderness.   Abdominal: Abdomen is soft. Bowel sounds are normal. She exhibits no distension. There is no abdominal tenderness. There is no rebound.   Musculoskeletal:         General: Tenderness present. Normal range of motion.      Cervical back: Normal, normal range of motion and neck supple.      Thoracic back: Normal.      Lumbar back: Tenderness present. No swelling or deformity.        Back:       Comments: Tenderness noted to lumbar spine; radiates down her LLE. No spinous tenderness on exam. Full 5/5 ROM noted. CMS intact. All other adjacent joints otherwise normal.        Neurological: She is alert and oriented to person, place, and time. She has normal strength. No sensory deficit. GCS score is 15. GCS eye subscore is 4. GCS verbal subscore is 5. GCS motor subscore is 6.   Skin: Skin is warm and dry. Capillary refill takes less than 2 seconds.   Psychiatric: She has a normal mood and affect. Thought content normal.         ED Course   Procedures  Labs Reviewed - No data to display       Imaging Results    None          Medications - No data to display  Medical Decision Making  Patient awake, alert, has non-labored breathing, and follows commands  appropriately. Arrived to ED due to atraumatic back pain that has been recurrent for several weeks. Denies known injury/trauma. Afebrile. NAD Noted.     Judging by the patient's chief complaint and pertinent history, the patient has the following possible differential diagnoses, including but not limited to the following: Back Pain, Sciatica, Lumbar Strain     Some of these are deemed to be lower likelihood and some more likely based on my physical exam and history combined with possible lab work and/or imaging studies. Please see the pertinent studies, and refer to the HPI. Some of these diagnoses will take further evaluation to fully rule out, perhaps as an outpatient and the patient was encouraged to follow up when discharged for more comprehensive evaluation.       Amount and/or Complexity of Data Reviewed  External Data Reviewed: radiology.  Discussion of management or test interpretation with external provider(s): Discussed plan of care and interventions with patient. Agreed to and aware of plan of care. Comfortable being discharged home. Patient discharged home. Patient denies new or additional complaints; no further tests indicated at this time. Verbalized understanding of instructions. No emergent or apparent distress noted prior to discharge. To follow up with PCP in 1 week as needed. Strict ER return precautions given.       Risk  OTC drugs.  Prescription drug management.               ED Course as of 09/13/24 1908   Fri Sep 13, 2024   0654 Chart review reveals patient had recent imaging due to ongoing back pain.     XR lumbar spine- No acute osseous abnormality.  XR hips- No acute osseous abnormality.  XR sacroiliac joint- The sacroiliac joints are patent and symmetric.   No evident bony ankylosis.   No significant periarticular sclerosis. No acute displaced fractures.   No bony destructive lesions.    [JA]   5689 Patient has scheduled appointment in October 2024 with Dr. Du regarding her back pain.  Patient symptoms consistent with sciatica. Patient recently finished steroid dose pack and cannot take NSAIDs due to being on Eliquis. Will prescribe short course of Zanaflex and Tramadol for symptoms. Cautioned on side effects. At disposition, patient has no additional complaints, no red flags, ambulatory, has outpatient f/u, and no trauma/deformities. Stable for discharge home.  [JA]      ED Course User Index  [JA] Keesha Talbert NP                           Clinical Impression:  Final diagnoses:  [M54.42] Acute bilateral low back pain with left-sided sciatica (Primary)          ED Disposition Condition    Discharge Stable          ED Prescriptions       Medication Sig Dispense Start Date End Date Auth. Provider    traMADoL (ULTRAM) 50 mg tablet Take 1 tablet (50 mg total) by mouth every 8 (eight) hours as needed for Pain. 12 tablet 9/13/2024 -- Keesha Talbert NP    tiZANidine (ZANAFLEX) 4 MG tablet Take 1 tablet (4 mg total) by mouth every 8 (eight) hours as needed. 12 tablet 9/13/2024 -- Keesha Talbert NP    LIDOcaine (LIDODERM) 5 % Place 1 patch onto the skin once daily. Remove & Discard patch within 12 hours or as directed by MD 10 patch 9/13/2024 -- Keesha Talbert NP          Follow-up Information       Follow up With Specialties Details Why Contact Info    [facility] Internal Medicine Call on 9/16/2024 If symptoms worsen, As needed [address] [facility] [address] [phone]      [facility] - Emergency Dept Emergency Medicine Go to  If symptoms worsen, As needed [address]  [facility] [address] [phone]    Lourdes Kessler MD Neurosurgery Call  If symptoms worsen, As needed [address]  [facility] [address]  [phone]      Efrain Almanza MD Neurosurgery Call  As needed, If symptoms worsen [address]  [facility] [address]  [phone]      Esteban Márquez MD Neurosurgery Call  As  needed, If symptoms worsen [address] [place] [address] Dr  [address]  [facility] [id number] [phone]      Francisco Du MD Orthopedic Surgery Call  If symptoms worsen, As needed [address] [facility] [address]  [phone]               Keesha Talbert, NP  09/13/24 1905       Keesha Talbret, MARIN  09/13/24 1907

## 2024-10-21 ENCOUNTER — OFFICE VISIT (OUTPATIENT)
Dept: ORTHOPEDIC SURGERY | Facility: CLINIC | Age: 74
End: 2024-10-21
Payer: MEDICARE

## 2024-10-21 ENCOUNTER — HOSPITAL ENCOUNTER (OUTPATIENT)
Dept: RADIOLOGY | Facility: CLINIC | Age: 74
Discharge: HOME | End: 2024-10-21
Attending: ORTHOPAEDIC SURGERY
Payer: MEDICARE

## 2024-10-21 DIAGNOSIS — M54.16 LUMBAR RADICULOPATHY: ICD-10-CM

## 2024-10-21 DIAGNOSIS — M47.812 CERVICAL SPONDYLOSIS: ICD-10-CM

## 2024-10-21 DIAGNOSIS — M51.360 DEGENERATION OF INTERVERTEBRAL DISC OF LUMBAR REGION WITH DISCOGENIC BACK PAIN: ICD-10-CM

## 2024-10-21 DIAGNOSIS — M47.816 LUMBAR SPONDYLOSIS: Primary | ICD-10-CM

## 2024-10-21 DIAGNOSIS — M50.30 DDD (DEGENERATIVE DISC DISEASE), CERVICAL: ICD-10-CM

## 2024-10-21 DIAGNOSIS — M54.9 DORSALGIA, UNSPECIFIED: ICD-10-CM

## 2024-10-21 PROCEDURE — 1160F RVW MEDS BY RX/DR IN RCRD: CPT | Mod: CPTII,S$GLB,, | Performed by: ORTHOPAEDIC SURGERY

## 2024-10-21 PROCEDURE — 3288F FALL RISK ASSESSMENT DOCD: CPT | Mod: CPTII,S$GLB,, | Performed by: ORTHOPAEDIC SURGERY

## 2024-10-21 PROCEDURE — 99214 OFFICE O/P EST MOD 30 MIN: CPT | Mod: S$GLB,,, | Performed by: ORTHOPAEDIC SURGERY

## 2024-10-21 PROCEDURE — 1125F AMNT PAIN NOTED PAIN PRSNT: CPT | Mod: CPTII,S$GLB,, | Performed by: ORTHOPAEDIC SURGERY

## 2024-10-21 PROCEDURE — 1159F MED LIST DOCD IN RCRD: CPT | Mod: CPTII,S$GLB,, | Performed by: ORTHOPAEDIC SURGERY

## 2024-10-21 PROCEDURE — 1101F PT FALLS ASSESS-DOCD LE1/YR: CPT | Mod: CPTII,S$GLB,, | Performed by: ORTHOPAEDIC SURGERY

## 2024-10-21 PROCEDURE — 72050 X-RAY EXAM NECK SPINE 4/5VWS: CPT | Mod: ,,, | Performed by: ORTHOPAEDIC SURGERY

## 2024-10-21 NOTE — PROGRESS NOTES
DATE: 10/21/2024  PATIENT: Nel Recinos    Supervising Physician: Francisco Du M.D.    CHIEF COMPLAINT: neck, low back, left leg pain    HISTORY:  Nel Recinos is a 74 y.o. female here for initial evaluation of low back and left thigh pain (Back - 7, Leg - 7). The pain has been present for months and started after having a bad cough/COVID 19. The patient describes the pain as sharp in her back with some radiation down the front of the left thigh.  The pain is worse with bending and moving and improved by rest. There is negative associated numbness and tingling. There is negative subjective weakness. Prior treatments have included medication and home exercises, but no ESIs or surgery.    The patient also has complaints of neck pain (Neck - 7, Arm - 0).  The pain has been present for months and started after having a bad cough/COVID 19. The patient describes the pain as aching. The pain is worse with movement and improved by rest. There is negative associated numbness and tingling. There is negative subjective weakness. Prior treatments have included medication and home exercises, but no ESIs or surgery.    The patient denies myelopathic symptoms such as handwriting changes or difficulty with buttons/coins/keys. Denies perineal paresthesias, bowel/bladder dysfunction.    PAST MEDICAL/SURGICAL HISTORY:  Past Medical History:   Diagnosis Date    A-fib     Anxiety disorder, unspecified     Arthritis     High cholesterol     Osteoarthritis      Past Surgical History:   Procedure Laterality Date    APPENDECTOMY  1979    CARPAL TUNNEL RELEASE Left     FOOT SURGERY      HAND SURGERY      hysterectomy      HYSTERECTOMY  2013    JOINT REPLACEMENT  2017    ROBOTIC ARTHROPLASTY,HIP,TOTAL,POSTERIOR APPROACH Left 11/27/2023    Procedure: ROBOTIC ARTHROPLASTY, HIP, TOTAL, POSTERIOR APPROACH;  Surgeon: Isidoro Waters MD;  Location: [facility];  Service: Orthopedics;  Laterality: Left;    TUBAL LIGATION  1982       Medications:    Current Outpatient Medications on File Prior to Visit   Medication Sig Dispense Refill    ELIQUIS 5 mg Tab Take 5 mg by mouth 2 (two) times daily.      fluticasone propionate (FLONASE) 50 mcg/actuation nasal spray USE 2 SPRAYS IN EACH NOSTRIL EVERY MORNING      montelukast (SINGULAIR) 10 mg tablet Take 10 mg by mouth.      pantoprazole (PROTONIX) 40 MG tablet Take 1 tablet by mouth every morning.      sertraline (ZOLOFT) 50 MG tablet Take 50 mg by mouth.      LIDOcaine (LIDODERM) 5 % Place 1 patch onto the skin once daily. Remove & Discard patch within 12 hours or as directed by MD 10 patch 0    meloxicam (MOBIC) 15 MG tablet Take 15 mg by mouth. (Patient not taking: Reported on 10/21/2024)      rosuvastatin (CRESTOR) 20 MG tablet Take 20 mg by mouth every evening.      sotaloL (BETAPACE) 80 MG tablet Take 1 tablet (80 mg total) by mouth 2 (two) times daily. 60 tablet 1    tiZANidine (ZANAFLEX) 4 MG tablet Take 1 tablet (4 mg total) by mouth every 8 (eight) hours as needed. (Patient not taking: Reported on 10/21/2024) 12 tablet 0    traMADoL (ULTRAM) 50 mg tablet Take 1 tablet (50 mg total) by mouth every 8 (eight) hours as needed for Pain. (Patient not taking: Reported on 10/21/2024) 12 tablet 0     No current facility-administered medications on file prior to visit.       Social History:   Social History     Socioeconomic History    Marital status:    Tobacco Use    Smoking status: Never    Smokeless tobacco: Never   Substance and Sexual Activity    Alcohol use: Not Currently     Comment: socially    Drug use: Never    Sexual activity: Not Currently     Partners: Male     Birth control/protection: See Surgical Hx       REVIEW OF SYSTEMS:  Constitution: Negative. Negative for chills, fever and night sweats.   Cardiovascular: Negative for chest pain and syncope.   Respiratory: Negative for cough and shortness of breath.   Gastrointestinal: See HPI. Negative for nausea/vomiting. Negative for abdominal  pain.  Genitourinary: See HPI. Negative for discoloration or dysuria.  Skin: Negative for dry skin, itching and rash.   Hematologic/Lymphatic: Negative for bleeding problem. Does not bruise/bleed easily.   Musculoskeletal: Negative for falls and muscle weakness.   Neurological: See HPI. No seizures.   Endocrine: Negative for polydipsia, polyphagia and polyuria.   Allergic/Immunologic: Negative for hives and persistent infections.     EXAM:  There were no vitals taken for this visit.    PHYSICAL EXAMINATION:    General: The patient is a very pleasant 74 y.o. female in no apparent distress, the patient is oriented to person, place and time.  Psych: Normal mood and affect  HEENT: Vision grossly intact, hearing intact to the spoken word.  Lungs: Respirations unlabored.  Gait: Normal station and gait, no difficulty with toe or heel walk.   Skin: Cervical skin and dorsal lumbar skin negative for rashes, lesions, hairy patches and surgical scars.    Range of motion: Cervical and lumbar range of motion is acceptable. There is mild tenderness to palpation of the paracervical muscles.  There is mild lumbar tenderness to palpation.  Spinal Balance: Global saggital and coronal spinal balance acceptable, no significant for scoliosis and kyphosis.  Musculoskeletal: No pain with the range of motion of the bilateral shoulders and elbows. Normal bulk and contour of the bilateral hands.  No pain with the range of motion of the bilateral hips. Mild bilateral trochanteric tenderness to palpation.  Vascular: Bilateral upper and lower extremities warm and well perfused, radial pulses 2+ bilaterally, dorsalis pedis pulses 2+ bilaterally.  Neurological: Normal strength and tone in all major motor groups in the bilateral upper and lower extremities. Normal sensation to light touch in the C5-T1 and L2-S1 dermatomes bilaterally.  Deep tendon reflexes symmetric 2+ in the bilateral upper and lower extremities.  Negative Inverted Radial Reflex  "and Kirk's bilaterally. Negative Babinski bilaterally. Negative straight leg raise bilaterally.     IMAGING:   Today I personally reviewed AP, Lat and Flex/Ex upright lumbar spine films demonstrate significant degenerative changes at L4-5.     AP lat flex ex cervical films demonstrate significant degenerative changes with straightening of normal lordosis.    There is no height or weight on file to calculate BMI.    No results found for: "HGBA1C"      ASSESSMENT/PLAN:    There are no diagnoses linked to this encounter.    Today we discussed at length all of the different treatment options including anti-inflammatories, acetaminophen, rest, ice, heat, physical therapy including strengthening and stretching exercises, home exercises, ROM, aerobic conditioning, aqua therapy, other modalities including ultrasound, massage, and dry needling, epidural steroid injections and finally surgical intervention.      Pt presents with chronic neck and low back pain with lumbar radiculopathy. I provided the patient with a home exercise program. It is the [employer] spine conditioning program. Exercises include head rolls, kneeling back extension, sitting rotation stretch, modified seated side straddle, knee to chest, bird dog, plank, modified seated plank, hip bridges, abdominal bracing, and abdominal crunch. Pt will complete each exercise 5 times daily for 6-8 weeks. I have provided the patient with a home exercise program. It is the [employer] cervical exercise program. Exercises include walking erectly with neutral head position, supine neutral head position, supine retraction, sitting or standing neck retraction, posture training, forward/backward/sideward isometric strengthening, prone head lifts, supine head lifts, scapular retraction, and neck rotation. Pt will complete each exercise twice daily for 6-8 weeks. Will obtain lumbar MRI to further evaluate and see patient back to discuss results.      "

## 2024-10-30 ENCOUNTER — HOSPITAL ENCOUNTER (OUTPATIENT)
Dept: RADIOLOGY | Facility: HOSPITAL | Age: 74
Discharge: HOME | End: 2024-10-30
Attending: ORTHOPAEDIC SURGERY
Payer: MEDICARE

## 2024-10-30 DIAGNOSIS — M54.9 DORSALGIA, UNSPECIFIED: ICD-10-CM

## 2024-10-30 PROCEDURE — 72148 MRI LUMBAR SPINE W/O DYE: CPT | Mod: TC

## 2024-11-04 ENCOUNTER — OFFICE VISIT (OUTPATIENT)
Dept: ORTHOPEDIC SURGERY | Facility: CLINIC | Age: 74
End: 2024-11-04
Attending: ORTHOPAEDIC SURGERY
Payer: MEDICARE

## 2024-11-04 VITALS — BODY MASS INDEX: 23.22 KG/M2 | HEIGHT: 64 IN | WEIGHT: 136 LBS

## 2024-11-04 DIAGNOSIS — M50.30 DDD (DEGENERATIVE DISC DISEASE), CERVICAL: ICD-10-CM

## 2024-11-04 DIAGNOSIS — M51.360 DEGENERATION OF INTERVERTEBRAL DISC OF LUMBAR REGION WITH DISCOGENIC BACK PAIN: ICD-10-CM

## 2024-11-04 DIAGNOSIS — M47.816 LUMBAR SPONDYLOSIS: Primary | ICD-10-CM

## 2024-11-04 DIAGNOSIS — M54.16 LUMBAR RADICULOPATHY: ICD-10-CM

## 2024-11-04 DIAGNOSIS — M47.812 CERVICAL SPONDYLOSIS: ICD-10-CM

## 2024-11-04 PROCEDURE — 3008F BODY MASS INDEX DOCD: CPT | Mod: CPTII,S$GLB,, | Performed by: ORTHOPAEDIC SURGERY

## 2024-11-04 PROCEDURE — 1125F AMNT PAIN NOTED PAIN PRSNT: CPT | Mod: CPTII,S$GLB,, | Performed by: ORTHOPAEDIC SURGERY

## 2024-11-04 PROCEDURE — 1101F PT FALLS ASSESS-DOCD LE1/YR: CPT | Mod: CPTII,S$GLB,, | Performed by: ORTHOPAEDIC SURGERY

## 2024-11-04 PROCEDURE — 99214 OFFICE O/P EST MOD 30 MIN: CPT | Mod: S$GLB,,, | Performed by: ORTHOPAEDIC SURGERY

## 2024-11-04 PROCEDURE — 1160F RVW MEDS BY RX/DR IN RCRD: CPT | Mod: CPTII,S$GLB,, | Performed by: ORTHOPAEDIC SURGERY

## 2024-11-04 PROCEDURE — 1159F MED LIST DOCD IN RCRD: CPT | Mod: CPTII,S$GLB,, | Performed by: ORTHOPAEDIC SURGERY

## 2024-11-04 PROCEDURE — 3288F FALL RISK ASSESSMENT DOCD: CPT | Mod: CPTII,S$GLB,, | Performed by: ORTHOPAEDIC SURGERY

## 2024-11-04 RX ORDER — GABAPENTIN 300 MG/1
300 CAPSULE ORAL 3 TIMES DAILY
Qty: 60 CAPSULE | Refills: 1 | Status: SHIPPED | OUTPATIENT
Start: 2024-11-04

## 2024-11-04 NOTE — PROGRESS NOTES
"DATE: 11/4/2024  PATIENT: Nel Recinos    Attending Physician: Francisco Du M.D.    HISTORY:  Nel Recinos is a 74 y.o. female who returns to me today for MRI review. Patient continues to have LBP that radiates down LLE to the thigh. She has been taking meds with some relief. She would like to continue conservative management. She also has neck pain without arm radiation.    The patient denies myelopathic symptoms such as handwriting changes or difficulty with buttons/coins/keys. Denies perineal paresthesias, bowel/bladder dysfunction.    PMH/PSH/FamHx/SocHx:  Unchanged from prior visit    ROS:  Positive for neck and LBP/LLE pain  Denies perineal paresthesias, bowel or bladder incontinence    EXAM:  Ht 5' 4" (1.626 m)   Wt 61.7 kg (136 lb 0.4 oz)   BMI 23.35 kg/m²     My physical examination was notable for the following findings: motor intact BUE and BLE; SILT    IMAGING:  Today I independently reviewed the following images and my interpretations are as follows:    Previous L-spine XRs showed spondylosis and DDD worst at L4-5.    Cervical Xrs showed spondylosis and DDD without listhesis.     Lumbar MRI showed L3-4 mod central and L4-5 severe central stenosis.    ASSESSMENT/PLAN:  Patient has lumbar spondylosis and stenosis with LLE radiculopathy. She also has cervical spondylosis. I educated the patient on the importance of core/back strengthening, correct posture, bending/lifting ergonomics, and low-impact aerobic exercises (walking, elliptical, and aquatherapy). I prescribed gabapentin. Continue medications. I ordered L4-5 ANISHA. I referred pt to PT for cervical ROM. RTC in 6 weeks for re-evaluation. Patient is a candidate for L4-5 PLDF/TLIF (L) if she fails conservative management.    Francisco Du MD  Orthopaedic Spine Surgeon  Department of Orthopaedic Surgery  [phone]  "

## 2024-11-26 ENCOUNTER — OFFICE VISIT (OUTPATIENT)
Dept: ORTHOPEDICS | Facility: CLINIC | Age: 74
End: 2024-11-26
Payer: MEDICARE

## 2024-11-26 ENCOUNTER — HOSPITAL ENCOUNTER (OUTPATIENT)
Dept: RADIOLOGY | Facility: CLINIC | Age: 74
Discharge: HOME | End: 2024-11-26
Attending: ORTHOPAEDIC SURGERY
Payer: MEDICARE

## 2024-11-26 VITALS
SYSTOLIC BLOOD PRESSURE: 133 MMHG | DIASTOLIC BLOOD PRESSURE: 87 MMHG | WEIGHT: 136 LBS | HEIGHT: 64 IN | BODY MASS INDEX: 23.22 KG/M2 | HEART RATE: 69 BPM

## 2024-11-26 DIAGNOSIS — Z96.642 AFTERCARE FOLLOWING LEFT HIP JOINT REPLACEMENT SURGERY: ICD-10-CM

## 2024-11-26 DIAGNOSIS — Z96.642 AFTERCARE FOLLOWING LEFT HIP JOINT REPLACEMENT SURGERY: Primary | ICD-10-CM

## 2024-11-26 DIAGNOSIS — Z47.1 AFTERCARE FOLLOWING LEFT HIP JOINT REPLACEMENT SURGERY: ICD-10-CM

## 2024-11-26 DIAGNOSIS — Z47.1 AFTERCARE FOLLOWING LEFT HIP JOINT REPLACEMENT SURGERY: Primary | ICD-10-CM

## 2024-11-26 PROCEDURE — 73502 X-RAY EXAM HIP UNI 2-3 VIEWS: CPT | Mod: LT,,, | Performed by: ORTHOPAEDIC SURGERY

## 2024-11-26 NOTE — PROGRESS NOTES
Chief Complaint:   Chief Complaint   Patient presents with    Follow-up     1yr sp Lt OLENA on 11/27/23 G 2/25/24. Doing well overall. States her main  concern is with pain in hip that radiates down thigh. Worse with ambulation. Xr today.       History of present illness:    History of Present Illness  The patient presents for evaluation of low back pain.    She reports experiencing pain across her lower back that extends down to her leg and thigh. The severity of the pain varies, with some days being so intense that walking becomes difficult. This has been ongoing for over a year, even before her hip surgery. The pain is particularly severe when she first stands up, causing her to nearly fall. She also experiences soreness in the top part of her thigh. The pain limits her activities, such as gardening, as bending becomes difficult. Topical treatments like Bengay and arthritis Tylenol provide some relief. Despite two previous doctor visits, no specific tests were conducted, and she was only prescribed pain medication and muscle relaxers. She has not undergone physical therapy for her back pain but has an upcoming appointment with a pain management specialist for potential injections.    She suspects that her hip may have been problematic prior to the fracture, which could be contributing to her current pain. She reports no issues with her hip currently.    She has a history of arthritis and was previously on Mobic, but had to discontinue it due to her Eliquis medication.    Additionally, she reports shoulder pain.    Past Medical History:   Diagnosis Date    A-fib     Anxiety disorder, unspecified     Arthritis     High cholesterol     Osteoarthritis        Past Surgical History:   Procedure Laterality Date    APPENDECTOMY  1979    CARPAL TUNNEL RELEASE Left     FOOT SURGERY      HAND SURGERY      hysterectomy      HYSTERECTOMY  2013    JOINT REPLACEMENT  2017    ROBOTIC ARTHROPLASTY,HIP,TOTAL,POSTERIOR APPROACH  Left 11/27/2023    Procedure: ROBOTIC ARTHROPLASTY, HIP, TOTAL, POSTERIOR APPROACH;  Surgeon: Isidoro Waters MD;  Location: [facility];  Service: Orthopedics;  Laterality: Left;    TUBAL LIGATION  1982       Current Outpatient Medications   Medication Sig    ELIQUIS 5 mg Tab Take 5 mg by mouth 2 (two) times daily.    fluticasone propionate (FLONASE) 50 mcg/actuation nasal spray USE 2 SPRAYS IN EACH NOSTRIL EVERY MORNING    gabapentin (NEURONTIN) 300 MG capsule Take 1 capsule (300 mg total) by mouth 3 (three) times daily.    montelukast (SINGULAIR) 10 mg tablet Take 10 mg by mouth.    pantoprazole (PROTONIX) 40 MG tablet Take 1 tablet by mouth every morning.    sertraline (ZOLOFT) 50 MG tablet Take 50 mg by mouth.    LIDOcaine (LIDODERM) 5 % Place 1 patch onto the skin once daily. Remove & Discard patch within 12 hours or as directed by MD    meloxicam (MOBIC) 15 MG tablet Take 15 mg by mouth. (Patient not taking: Reported on 11/26/2024)    rosuvastatin (CRESTOR) 20 MG tablet Take 20 mg by mouth every evening.    sotaloL (BETAPACE) 80 MG tablet Take 1 tablet (80 mg total) by mouth 2 (two) times daily.    tiZANidine (ZANAFLEX) 4 MG tablet Take 1 tablet (4 mg total) by mouth every 8 (eight) hours as needed. (Patient not taking: Reported on 11/26/2024)    traMADoL (ULTRAM) 50 mg tablet Take 1 tablet (50 mg total) by mouth every 8 (eight) hours as needed for Pain. (Patient not taking: Reported on 11/26/2024)     No current facility-administered medications for this visit.       Review of patient's allergies indicates:   Allergen Reactions    Tomato     Weed pollen        Family History   Problem Relation Name Age of Onset    Alcohol abuse Mother aurelia meyer     Alcohol abuse Sister toro marcustayla     Arthritis Sister toro marcustayla     Stroke Sister toro tayla     Cancer Sister lori hsieh        Social History     Socioeconomic History    Marital status:    Tobacco Use    Smoking status: Never    Smokeless  tobacco: Never   Substance and Sexual Activity    Alcohol use: Not Currently     Comment: socially    Drug use: Never    Sexual activity: Not Currently     Partners: Male     Birth control/protection: See Surgical Hx           Review of Systems:    Constitution: Negative for chills, fever, and sweats.  Negative for unexplained weight loss.    HENT:  Negative for headaches and blurry vision.    Cardiovascular:Negative for chest pain or irregular heart beat. Negative for hypertension.    Respiratory:  Negative for cough and shortness of breath.    Gastrointestinal: Negative for abdominal pain, heartburn, melena, nausea, and vomitting.    Genitourinary:  Negative bladder incontinence and dysuria.    Musculoskeletal:  See HPI    Neurological: Negative for numbness.    Psychiatric/Behavioral: Negative for depression.  The patient is not nervous/anxious.      Endocrine: Negative for polyuria    Hematologic/Lymphatic: Negative for bleeding problem.  Does not bruise/bleed easily.    Skin: Negative for poor would healing and rash      Physical Examination:    Vital Signs:    Vitals:    11/26/24 1422   BP: 133/87   Pulse: 69       Body mass index is 23.35 kg/m².    General: No acute distress, alert and oriented, healthy appearing    HEENT: Head is atraumatic, mucous membranes are moist    Neck: Supples, no JVD    Cardiovascular: Palpable dorsalis pedis and posterior tibial pulses, regular rate and rhythm to those pulses    Lungs: Breathing non-labored    Skin: no rashes appreciated    Neurologic: Can flex and extend knees, ankles, and toes. Sensation is grossly intact    Left hip:  Range of motion hip without significant or severe severe pain.  No discomfort.  Internal and external rotation without groin pain.  Negative Stinchfield.  No significant tenderness.    X-rays:  Three views left hip reviewed.  Patient was implants appear well fixed.  No signs of loosening or subsidence noted.     Assessment::  Status post left total  hip arthroplasty for femoral neck fracture    Plan:  Patient overall is doing well.  She was having some thigh pain.  It was intermittent.  It was not appear to have a loose femoral component.  Maybe having some of this it was due to stem pain.  Monitor this with x-rays.  It was her come back in 6 months repeat x-rays of the left hip or sooner should there be worsening worsening pain or discomfort.    This note was generated with the assistance of ambient listening technology. Verbal consent was obtained by the patient and accompanying visitor(s) for the recording of patient appointment to facilitate this note. I attest to having reviewed and edited the generated note for accuracy, though some syntax or spelling errors may persist. Please contact the author of this note for any clarification.      This note was created using WebNotes voice recognition software that occasionally misinterpreted phrases or words.    Consult note is delivered via Epic messaging service.

## 2024-12-11 ENCOUNTER — OFFICE VISIT (OUTPATIENT)
Facility: CLINIC | Age: 74
End: 2024-12-11
Payer: MEDICARE

## 2024-12-11 VITALS
OXYGEN SATURATION: 97 % | DIASTOLIC BLOOD PRESSURE: 75 MMHG | SYSTOLIC BLOOD PRESSURE: 134 MMHG | HEART RATE: 61 BPM | WEIGHT: 139 LBS | HEIGHT: 64 IN | BODY MASS INDEX: 23.73 KG/M2

## 2024-12-11 DIAGNOSIS — M51.362 DEGENERATION OF INTERVERTEBRAL DISC OF LUMBAR REGION WITH DISCOGENIC BACK PAIN AND LOWER EXTREMITY PAIN: ICD-10-CM

## 2024-12-11 DIAGNOSIS — G89.29 CHRONIC BACK PAIN GREATER THAN 3 MONTHS DURATION: Primary | ICD-10-CM

## 2024-12-11 DIAGNOSIS — M54.9 CHRONIC BACK PAIN GREATER THAN 3 MONTHS DURATION: Primary | ICD-10-CM

## 2024-12-11 DIAGNOSIS — M54.16 LUMBAR RADICULOPATHY: ICD-10-CM

## 2024-12-11 PROCEDURE — 3008F BODY MASS INDEX DOCD: CPT | Mod: CPTII,,, | Performed by: ANESTHESIOLOGY

## 2024-12-11 PROCEDURE — 1159F MED LIST DOCD IN RCRD: CPT | Mod: CPTII,,, | Performed by: ANESTHESIOLOGY

## 2024-12-11 PROCEDURE — 1101F PT FALLS ASSESS-DOCD LE1/YR: CPT | Mod: CPTII,,, | Performed by: ANESTHESIOLOGY

## 2024-12-11 PROCEDURE — 1125F AMNT PAIN NOTED PAIN PRSNT: CPT | Mod: CPTII,,, | Performed by: ANESTHESIOLOGY

## 2024-12-11 PROCEDURE — 1160F RVW MEDS BY RX/DR IN RCRD: CPT | Mod: CPTII,,, | Performed by: ANESTHESIOLOGY

## 2024-12-11 PROCEDURE — 3078F DIAST BP <80 MM HG: CPT | Mod: CPTII,,, | Performed by: ANESTHESIOLOGY

## 2024-12-11 PROCEDURE — 99204 OFFICE O/P NEW MOD 45 MIN: CPT | Mod: ,,, | Performed by: ANESTHESIOLOGY

## 2024-12-11 PROCEDURE — 3288F FALL RISK ASSESSMENT DOCD: CPT | Mod: CPTII,,, | Performed by: ANESTHESIOLOGY

## 2024-12-11 PROCEDURE — 3075F SYST BP GE 130 - 139MM HG: CPT | Mod: CPTII,,, | Performed by: ANESTHESIOLOGY

## 2024-12-11 RX ORDER — EZETIMIBE 10 MG/1
10 TABLET ORAL
COMMUNITY
Start: 2024-10-02 | End: 2025-03-31

## 2024-12-11 NOTE — PROGRESS NOTES
Linsey Reed MD        PATIENT NAME: Nel Recinos  : 1950  DATE: 24  MRN: [id number]      Billing Provider: Linsey Reed MD  Level of Service:   Patient PCP Information       Provider PCP Type    [facility] E[facility]            Reason for Visit / Chief Complaint: Pain (Patient reports lower back pain rating at a 10 today. She describes the pain as aching pain. )       Update PCP  Update Chief Complaint         History of Present Illness / Problem Focused Workflow     Nel Recinos presents to the clinic with Pain (Patient reports lower back pain rating at a 10 today. She describes the pain as aching pain. )     This is a 74-year-old female who presents to clinic today for her initial consultation as a referral from Dr. Du.  She complains of low back pain that started in July of this year.  She had COVID and was coughing a lot, and that was when her back pain started.  She also has pain in her left leg that radiates down to the knee.  She states that she is not able to bend over to  something off floor and also has a hard time getting out of bed.  She is not able to take any NSAIDs because she is on Eliquis, but she does take Tylenol Arthritis.  She has not undergone any injections or surgery on her lumbar spine.  She did some physical therapy several years ago, but nothing recent.  She states that her pain gets up to 10/10 on the NRS at worst and down to 5/10 at best.      Pain        Review of Systems     Review of Systems   Musculoskeletal:  Positive for back pain and leg pain.   Psychiatric/Behavioral:  Positive for sleep disturbance.    All other systems reviewed and are negative.       Medical / Social / Family History     Past Medical History:   Diagnosis Date    A-fib     Anxiety disorder, unspecified     Arthritis     High cholesterol     Osteoarthritis        Past Surgical History:   Procedure Laterality Date    APPENDECTOMY      CARPAL TUNNEL RELEASE Left      FOOT SURGERY      HAND SURGERY      hysterectomy      HYSTERECTOMY  2013    JOINT REPLACEMENT  2017    ROBOTIC ARTHROPLASTY,HIP,TOTAL,POSTERIOR APPROACH Left 11/27/2023    Procedure: ROBOTIC ARTHROPLASTY, HIP, TOTAL, POSTERIOR APPROACH;  Surgeon: Isidoro Waters MD;  Location: [facility];  Service: Orthopedics;  Laterality: Left;    TUBAL LIGATION  1982       Social History  Ms. Recinos  reports that she has never smoked. She has never used smokeless tobacco. She reports that she does not currently use alcohol. She reports that she does not use drugs.    Family History  Ms.'s Recinos family history includes Alcohol abuse in her mother and sister; Arthritis in her sister; Cancer in her sister; Stroke in her sister.    Medications and Allergies     Medications  Outpatient Medications Marked as Taking for the 12/11/24 encounter (Office Visit) with Linsey Reed MD   Medication Sig Dispense Refill    ELIQUIS 5 mg Tab Take 5 mg by mouth 2 (two) times daily.      ezetimibe (ZETIA) 10 mg tablet Take 10 mg by mouth.      fluticasone propionate (FLONASE) 50 mcg/actuation nasal spray USE 2 SPRAYS IN EACH NOSTRIL EVERY MORNING      gabapentin (NEURONTIN) 300 MG capsule Take 1 capsule (300 mg total) by mouth 3 (three) times daily. 60 capsule 1    montelukast (SINGULAIR) 10 mg tablet Take 10 mg by mouth.      pantoprazole (PROTONIX) 40 MG tablet Take 1 tablet by mouth every morning.      sertraline (ZOLOFT) 50 MG tablet Take 50 mg by mouth.      sotaloL (BETAPACE) 80 MG tablet Take 1 tablet (80 mg total) by mouth 2 (two) times daily. 60 tablet 1    traMADoL (ULTRAM) 50 mg tablet Take 1 tablet (50 mg total) by mouth every 8 (eight) hours as needed for Pain. 12 tablet 0       Allergies  Review of patient's allergies indicates:   Allergen Reactions    Tomato     Weed pollen        Physical Examination     Vitals:    12/11/24 0914   BP: 134/75   Pulse: 61     Pain Disability Index (PDI): 54       Spine Musculoskeletal  Exam    Gait    Gait is normal.    Inspection    Thoracolumbar    Thoracolumbar inspection is normal.    Palpation    Thoracolumbar    Tenderness: present      Paraspinous: right    Right      Masses: none      Spasms: none      Crepitus: none      Muscle tone: normal    Left      Masses: none      Spasms: none      Crepitus: none      Muscle tone: normal    Range of Motion    Thoracolumbar        Thoracolumbar range of motion additional comments: Limited range of motion in the lumbar spine due to pain.    Strength    Thoracolumbar    Thoracolumbar motor exam is normal.       Sensory    Thoracolumbar    Thoracolumbar sensation is normal.    General      Constitutional: appears stated age, well-developed and well-nourished    Scleral icterus: no    Labored breathing: no    Psychiatric: normal mood and affect and no acute distress    Neurological: alert and oriented x3    Skin: intact    Lymphadenopathy: none  CV: extremities warm, well-perfused  Resp: nonlabored breathing       Assessment and Plan (including Health Maintenance)      Problem List  Smart Sets  Document Outside HM   :    Plan:   Chronic back pain greater than 3 months duration    Lumbar radiculopathy  -     Ambulatory referral/consult to Pain Clinic    Degeneration of intervertebral disc of lumbar region with discogenic back pain and lower extremity pain       She is being scheduled for bilateral L4 transforaminal epidural steroid injections today to help with her chronic low back pain consistent with findings of degenerative disc disease and spinal stenosis seen on her MRI.  The plan was discussed with the patient and she wishes to proceed.    Problem List Items Addressed This Visit          Neuro    Lumbar radiculopathy    Degeneration of intervertebral disc of lumbar region with discogenic back pain and lower extremity pain       Orthopedic    Chronic back pain greater than 3 months duration - Primary         Future Appointments   Date Time Provider  Department Center   1/13/2025 12:15 PM Francisco Du MD [facility] [place]   1/28/2025 11:00 AM Linsey Reed MD [facility] [place]   5/27/2025  2:00 PM Isidoro Waters MD [facility] [place]        There are no Patient Instructions on file for this visit.  No follow-ups on file.     Signature:  Linsey Reed MD      Date of encounter: 12/11/24

## 2024-12-12 DIAGNOSIS — M54.16 LUMBAR RADICULOPATHY: ICD-10-CM

## 2024-12-12 DIAGNOSIS — M51.362 DEGENERATION OF INTERVERTEBRAL DISC OF LUMBAR REGION WITH DISCOGENIC BACK PAIN AND LOWER EXTREMITY PAIN: ICD-10-CM

## 2024-12-12 DIAGNOSIS — M51.369 DEGENERATION OF INTERVERTEBRAL DISC OF LUMBAR REGION, UNSPECIFIED WHETHER PAIN PRESENT: Primary | ICD-10-CM

## 2024-12-12 DIAGNOSIS — M47.26 OTHER SPONDYLOSIS WITH RADICULOPATHY, LUMBAR REGION: ICD-10-CM

## 2025-01-13 ENCOUNTER — ANESTHESIA EVENT (OUTPATIENT)
Facility: HOSPITAL | Age: 75
End: 2025-01-13
Payer: MEDICARE

## 2025-01-13 ENCOUNTER — ANESTHESIA (OUTPATIENT)
Facility: HOSPITAL | Age: 75
End: 2025-01-13
Payer: MEDICARE

## 2025-01-13 ENCOUNTER — HOSPITAL ENCOUNTER (OUTPATIENT)
Facility: HOSPITAL | Age: 75
Discharge: HOME | End: 2025-01-13
Attending: ANESTHESIOLOGY | Admitting: ANESTHESIOLOGY
Payer: MEDICARE

## 2025-01-13 DIAGNOSIS — M51.369 DEGENERATION OF INTERVERTEBRAL DISC OF LUMBAR REGION, UNSPECIFIED WHETHER PAIN PRESENT: ICD-10-CM

## 2025-01-13 DIAGNOSIS — M54.9 CHRONIC BACK PAIN GREATER THAN 3 MONTHS DURATION: Primary | ICD-10-CM

## 2025-01-13 DIAGNOSIS — G89.29 CHRONIC BACK PAIN GREATER THAN 3 MONTHS DURATION: Primary | ICD-10-CM

## 2025-01-13 PROCEDURE — 63600175 PHARM REV CODE 636 W HCPCS: Performed by: NURSE ANESTHETIST, CERTIFIED REGISTERED

## 2025-01-13 PROCEDURE — 37000008 HC ANESTHESIA 1ST 15 MINUTES: Performed by: ANESTHESIOLOGY

## 2025-01-13 PROCEDURE — 64483 NJX AA&/STRD TFRM EPI L/S 1: CPT | Mod: 50,,, | Performed by: ANESTHESIOLOGY

## 2025-01-13 PROCEDURE — 64483 NJX AA&/STRD TFRM EPI L/S 1: CPT | Mod: 50 | Performed by: ANESTHESIOLOGY

## 2025-01-13 PROCEDURE — D9220A PRA ANESTHESIA: Mod: ,,, | Performed by: NURSE ANESTHETIST, CERTIFIED REGISTERED

## 2025-01-13 PROCEDURE — 25500020 PHARM REV CODE 255: Performed by: ANESTHESIOLOGY

## 2025-01-13 PROCEDURE — 63600175 PHARM REV CODE 636 W HCPCS: Performed by: ANESTHESIOLOGY

## 2025-01-13 RX ORDER — LIDOCAINE HYDROCHLORIDE 10 MG/ML
INJECTION, SOLUTION EPIDURAL; INFILTRATION; INTRACAUDAL; PERINEURAL
Status: DISCONTINUED | OUTPATIENT
Start: 2025-01-13 | End: 2025-01-13

## 2025-01-13 RX ORDER — SODIUM CHLORIDE, SODIUM GLUCONATE, SODIUM ACETATE, POTASSIUM CHLORIDE AND MAGNESIUM CHLORIDE 30; 37; 368; 526; 502 MG/100ML; MG/100ML; MG/100ML; MG/100ML; MG/100ML
INJECTION, SOLUTION INTRAVENOUS CONTINUOUS
Status: CANCELLED | OUTPATIENT
Start: 2025-01-13 | End: 2025-02-12

## 2025-01-13 RX ORDER — LIDOCAINE HYDROCHLORIDE 10 MG/ML
INJECTION, SOLUTION EPIDURAL; INFILTRATION; INTRACAUDAL; PERINEURAL
Status: DISCONTINUED | OUTPATIENT
Start: 2025-01-13 | End: 2025-01-13 | Stop reason: HOSPADM

## 2025-01-13 RX ORDER — ONDANSETRON HYDROCHLORIDE 2 MG/ML
4 INJECTION, SOLUTION INTRAVENOUS DAILY PRN
Status: CANCELLED | OUTPATIENT
Start: 2025-01-13

## 2025-01-13 RX ORDER — SODIUM CHLORIDE, SODIUM LACTATE, POTASSIUM CHLORIDE, CALCIUM CHLORIDE 600; 310; 30; 20 MG/100ML; MG/100ML; MG/100ML; MG/100ML
INJECTION, SOLUTION INTRAVENOUS CONTINUOUS
Status: CANCELLED | OUTPATIENT
Start: 2025-01-13

## 2025-01-13 RX ORDER — IOPAMIDOL 408 MG/ML
INJECTION, SOLUTION INTRATHECAL
Status: DISCONTINUED | OUTPATIENT
Start: 2025-01-13 | End: 2025-01-13 | Stop reason: HOSPADM

## 2025-01-13 RX ORDER — PROPOFOL 10 MG/ML
VIAL (ML) INTRAVENOUS
Status: DISCONTINUED | OUTPATIENT
Start: 2025-01-13 | End: 2025-01-13

## 2025-01-13 RX ORDER — BUPIVACAINE HYDROCHLORIDE 2.5 MG/ML
INJECTION, SOLUTION EPIDURAL; INFILTRATION; INTRACAUDAL
Status: DISCONTINUED | OUTPATIENT
Start: 2025-01-13 | End: 2025-01-13 | Stop reason: HOSPADM

## 2025-01-13 RX ORDER — DEXAMETHASONE SODIUM PHOSPHATE 10 MG/ML
INJECTION INTRAMUSCULAR; INTRAVENOUS
Status: DISCONTINUED | OUTPATIENT
Start: 2025-01-13 | End: 2025-01-13 | Stop reason: HOSPADM

## 2025-01-13 RX ADMIN — PROPOFOL 70 MG: 10 INJECTION, EMULSION INTRAVENOUS at 10:01

## 2025-01-13 RX ADMIN — LIDOCAINE HYDROCHLORIDE 20 MG: 10 INJECTION, SOLUTION EPIDURAL; INFILTRATION; INTRACAUDAL; PERINEURAL at 10:01

## 2025-01-13 NOTE — ANESTHESIA POSTPROCEDURE EVALUATION
Anesthesia Post Evaluation    Patient: Nel Recinos    Procedure(s) Performed: Procedure(s) (LRB):  INJECTION, STEROID, EPIDURAL, TRANSFORAMINAL APPROACH     /////TFESI L4-L5 B/L (Bilateral)    Final Anesthesia Type: general      Patient location during evaluation: OPS  Patient participation: Yes- Able to Participate  Level of consciousness: awake and alert and oriented  Post-procedure vital signs: reviewed and stable  Airway patency: patent    PONV status at discharge: No PONV  Anesthetic complications: no      Cardiovascular status: blood pressure returned to baseline  Respiratory status: unassisted  Hydration status: euvolemic  Follow-up not needed.              Vitals Value Taken Time   /91 01/13/25 0906   Temp 36.4 °C (97.6 °F) 01/13/25 0906   Pulse 66 01/13/25 0906   Resp 16 01/13/25 1058   SpO2 98 % 01/13/25 0906         No case tracking events are documented in the log.      Pain/Dagmar Score: No data recorded         Bishop Larson MD PGY-2  Contact/Pager - [phone]      Patient is a 46y old  Female who presents with a chief complaint of CF exacerbation (30 Mar 2019 09:31)        SUBJECTIVE / OVERNIGHT EVENTS: No acute events ON.       MEDICATIONS  (STANDING):  ALBUTerol    0.083% 2.5 milliGRAM(s) Nebulizer two times a day  cefTAZidime  IVPB      cefTAZidime  IVPB 2 Gram(s) IV Intermittent every 8 hours  cholecalciferol 400 Unit(s) Oral daily  dornase diana Solution 2.5 milliGRAM(s) Inhalation two times a day  loratadine 10 milliGRAM(s) Oral daily  pantoprazole    Tablet 40 milliGRAM(s) Oral before breakfast  predniSONE   Tablet 20 milliGRAM(s) Oral daily  sodium chloride 7% Inhalation 4 milliLiter(s) Inhalation two times a day    MEDICATIONS  (PRN):  ALBUTerol    90 MICROgram(s) HFA Inhaler 1 Puff(s) Inhalation four times a day PRN Bronchospasm      Allergies    adhesives (Rash)  chlorhexidine containing compounds (Rash)  clindamycin (Hives)  fluoroquinolone antibiotics (Unknown)  levofloxacin (Hives)  macrolide antibiotics (Other)  minocycline (Rash)  moxifloxacin (Rash)  penicillin (Hives)  Zithromax Z-Ivan (Hives)    Intolerances          Vital Signs Last 24 Hrs  T(C): 36.2 (31 Mar 2019 05:19), Max: 36.8 (30 Mar 2019 13:20)  T(F): 97.2 (31 Mar 2019 05:19), Max: 98.2 (30 Mar 2019 13:20)  HR: 85 (31 Mar 2019 05:19) (74 - 86)  BP: 101/60 (31 Mar 2019 05:19) (101/60 - 116/70)  BP(mean): --  RR: 18 (31 Mar 2019 05:19) (18 - 20)  SpO2: 100% (31 Mar 2019 05:19) (100% - 100%)  CAPILLARY BLOOD GLUCOSE        I&O's Summary        PHYSICAL EXAM:  GENERAL: NAD, well-developed  HEAD:  AT, NC  EYES: EOMI, PERRLA, conjunctiva and sclera clear  ENMT: Airway patent. MMM. Good dentition, no lesions.  NECK: Supple, No JVD  CHEST/LUNG: CTABL; diffuse rhonchi b/l  HEART: RRR; Normal S1, S2. No murmurs, rubs, or gallops  ABDOMEN: Soft, NT, ND; Bowel sounds present. No organomegaly  EXTREMITIES:  2+ Peripheral Pulses, No clubbing, cyanosis, or edema  PSYCH: AAOx3  NEUROLOGY: non-focal  SKIN: Warm, dry, intact; No rashes or lesions      LABS:                        11.5   9.95  )-----------( 396      ( 29 Mar 2019 15:45 )             36.8     03-29    139  |  102  |  8   ----------------------------<  111<H>  3.9   |  22  |  0.52    Ca    9.5      29 Mar 2019 15:45  Phos  2.9     03-29  Mg     2.0     03-29    TPro  8.4<H>  /  Alb  4.8  /  TBili  < 0.2<L>  /  DBili  x   /  AST  12  /  ALT  11  /  AlkPhos  68  03-29    LIVER FUNCTIONS - ( 29 Mar 2019 15:45 )  Alb: 4.8 g/dL / Pro: 8.4 g/dL / ALK PHOS: 68 u/L / ALT: 11 u/L / AST: 12 u/L / GGT: x                       Culture - Respiratory with Gram Stain (collected 30 Mar 2019 18:44)  Source: SPUTUM    Culture - Blood (collected 29 Mar 2019 16:02)  Source: BLOOD  Preliminary Report (30 Mar 2019 15:59):    NO ORGANISMS ISOLATED    NO ORGANISMS ISOLATED AT 24 HOURS          RADIOLOGY & ADDITIONAL TESTS:    Imaging Personally Reviewed:     Consultant(s) Notes Reviewed:     Care Discussed with Consultants/Other Providers: Bishop Larson MD PGY-2  Contact/Pager - [phone]      Patient is a 46y old  Female who presents with a chief complaint of CF exacerbation (30 Mar 2019 09:31)        SUBJECTIVE / OVERNIGHT EVENTS: No acute events ON. Pt w/o acute complaints, denies worsening cough, SOB, CP, F/C      MEDICATIONS  (STANDING):  ALBUTerol    0.083% 2.5 milliGRAM(s) Nebulizer two times a day  cefTAZidime  IVPB      cefTAZidime  IVPB 2 Gram(s) IV Intermittent every 8 hours  cholecalciferol 400 Unit(s) Oral daily  dornase diana Solution 2.5 milliGRAM(s) Inhalation two times a day  loratadine 10 milliGRAM(s) Oral daily  pantoprazole    Tablet 40 milliGRAM(s) Oral before breakfast  predniSONE   Tablet 20 milliGRAM(s) Oral daily  sodium chloride 7% Inhalation 4 milliLiter(s) Inhalation two times a day    MEDICATIONS  (PRN):  ALBUTerol    90 MICROgram(s) HFA Inhaler 1 Puff(s) Inhalation four times a day PRN Bronchospasm      Allergies    adhesives (Rash)  chlorhexidine containing compounds (Rash)  clindamycin (Hives)  fluoroquinolone antibiotics (Unknown)  levofloxacin (Hives)  macrolide antibiotics (Other)  minocycline (Rash)  moxifloxacin (Rash)  penicillin (Hives)  Zithromax Z-Ivan (Hives)    Intolerances          Vital Signs Last 24 Hrs  T(C): 36.2 (31 Mar 2019 05:19), Max: 36.8 (30 Mar 2019 13:20)  T(F): 97.2 (31 Mar 2019 05:19), Max: 98.2 (30 Mar 2019 13:20)  HR: 85 (31 Mar 2019 05:19) (74 - 86)  BP: 101/60 (31 Mar 2019 05:19) (101/60 - 116/70)  BP(mean): --  RR: 18 (31 Mar 2019 05:19) (18 - 20)  SpO2: 100% (31 Mar 2019 05:19) (100% - 100%)  CAPILLARY BLOOD GLUCOSE        I&O's Summary        PHYSICAL EXAM:  GENERAL: NAD, well-developed  HEAD:  AT, NC  EYES: EOMI, PERRLA, conjunctiva and sclera clear  ENMT: Airway patent. MMM. Good dentition, no lesions.  NECK: Supple, No JVD  CHEST/LUNG: CTABL  HEART: RRR; Normal S1, S2. No murmurs, rubs, or gallops  ABDOMEN: Soft, NT, ND; Bowel sounds present. No organomegaly  EXTREMITIES:  2+ Peripheral Pulses, No clubbing, cyanosis, or edema  PSYCH: AAOx3  NEUROLOGY: non-focal  SKIN: Warm, dry, intact; No rashes or lesions      LABS:                        11.5   9.95  )-----------( 396      ( 29 Mar 2019 15:45 )             36.8     03-29    139  |  102  |  8   ----------------------------<  111<H>  3.9   |  22  |  0.52    Ca    9.5      29 Mar 2019 15:45  Phos  2.9     03-29  Mg     2.0     03-29    TPro  8.4<H>  /  Alb  4.8  /  TBili  < 0.2<L>  /  DBili  x   /  AST  12  /  ALT  11  /  AlkPhos  68  03-29    LIVER FUNCTIONS - ( 29 Mar 2019 15:45 )  Alb: 4.8 g/dL / Pro: 8.4 g/dL / ALK PHOS: 68 u/L / ALT: 11 u/L / AST: 12 u/L / GGT: x                       Culture - Respiratory with Gram Stain (collected 30 Mar 2019 18:44)  Source: SPUTUM    Culture - Blood (collected 29 Mar 2019 16:02)  Source: BLOOD  Preliminary Report (30 Mar 2019 15:59):    NO ORGANISMS ISOLATED    NO ORGANISMS ISOLATED AT 24 HOURS          RADIOLOGY & ADDITIONAL TESTS:    Imaging Personally Reviewed:     Consultant(s) Notes Reviewed:     Care Discussed with Consultants/Other Providers:

## 2025-01-13 NOTE — OP NOTE
Procedure:    Left L4  transforaminal epidural steroid injection    Right L4  transforaminal epidural steroid injection    Pre-Procedure Diagnoses:  Chronic back pain greater than 3 months  Lumbar degenerative disc disease  Lumbar radiculopathy  Lumbar disc displacement    Post-Procedure Diagnoses:  Chronic back pain greater than 3 months  Lumbar degenerative disc disease  Lumbar radiculopathy  Lumbar disc displacement    Anesthesia:  Local and MAC    Estimated Blood Loss:  < 2 ML    Consent:  The procedure, risks, benefits, and alternatives were discussed with the patient.  The patient voiced understanding and fully informed written consent was obtained.    Description of the Procedure:  The patient was taken to the operating room and placed in the prone position. The skin overlying the lumbar spine was prepped with Chloraprep and draped in the usual sterile fashion.  An oblique fluoroscopic view was obtained on the left side at L4, with the superior articular process of the inferior vertebral body aligned with the pedicle.  Skin anesthesia was achieved using 2 mL of lidocaine 1%.  A 22-gauge 3.5 -inch Quinke spinal needle was inserted and advanced under intermittent fluoroscopic views into the epidural space. Proper needle position was confirmed under AP, oblique, and lateral fluoroscopic views.  Negative aspiration for blood or CSF was confirmed. 1 mL of contrast was injected, which revealed spread into the epidural space.  Then a combination of 5 mg of dexamethasone with 1 mL of 0.25% bupivacaine was easily injected.   There was no pain on injection. The needle was removed intact and bleeding was nil.  The same procedure was repeated in identical fashion on the right side at L4 .  Sterile bandages were applied. The patient was taken to the recovery room for further observation in stable condition. The patient was then discharged home with no complications.

## 2025-01-13 NOTE — H&P
Linsey Reed MD          PATIENT NAME: Nel Recinos  : 1950    MRN: [id number]       Billing Provider: Linsey Reed MD  Level of Service:   Patient PCP Information         Provider PCP Type     P[facility] Eubanks Jr [facility]                Reason for Visit / Chief Complaint: Pain (Patient reports lower back pain rating at a 10 today. She describes the pain as aching pain. )         Update PCP   Update Chief Complaint             History of Present Illness / Problem Focused Workflow      Nel Recinos presents to the clinic with Pain (Patient reports lower back pain rating at a 10 today. She describes the pain as aching pain. )     This is a 74-year-old female who presents to clinic today for her initial consultation as a referral from Dr. Du.  She complains of low back pain that started in July of this year.  She had COVID and was coughing a lot, and that was when her back pain started.  She also has pain in her left leg that radiates down to the knee.  She states that she is not able to bend over to  something off floor and also has a hard time getting out of bed.  She is not able to take any NSAIDs because she is on Eliquis, but she does take Tylenol Arthritis.  She has not undergone any injections or surgery on her lumbar spine.  She did some physical therapy several years ago, but nothing recent.  She states that her pain gets up to 10/10 on the NRS at worst and down to 5/10 at best.        Pain           Review of Systems      Review of Systems   Musculoskeletal:  Positive for back pain and leg pain.   Psychiatric/Behavioral:  Positive for sleep disturbance.    All other systems reviewed and are negative.        Medical / Social / Family History           Past Medical History:   Diagnosis Date    A-fib      Anxiety disorder, unspecified      Arthritis      High cholesterol      Osteoarthritis                 Past Surgical History:   Procedure Laterality Date    APPENDECTOMY        CARPAL TUNNEL RELEASE Left      FOOT SURGERY        HAND SURGERY        hysterectomy        HYSTERECTOMY   2013    JOINT REPLACEMENT   2017    ROBOTIC ARTHROPLASTY,HIP,TOTAL,POSTERIOR APPROACH Left 11/27/2023     Procedure: ROBOTIC ARTHROPLASTY, HIP, TOTAL, POSTERIOR APPROACH;  Surgeon: Isidoro Waters MD;  Location: [facility];  Service: Orthopedics;  Laterality: Left;    TUBAL LIGATION   1982         Social History  Ms. Recinos  reports that she has never smoked. She has never used smokeless tobacco. She reports that she does not currently use alcohol. She reports that she does not use drugs.     Family History  Ms.'s Recinos family history includes Alcohol abuse in her mother and sister; Arthritis in her sister; Cancer in her sister; Stroke in her sister.     Medications and Allergies      Medications         Outpatient Medications Marked as Taking for the 12/11/24 encounter (Office Visit) with Linsey Reed MD   Medication Sig Dispense Refill    ELIQUIS 5 mg Tab Take 5 mg by mouth 2 (two) times daily.        ezetimibe (ZETIA) 10 mg tablet Take 10 mg by mouth.        fluticasone propionate (FLONASE) 50 mcg/actuation nasal spray USE 2 SPRAYS IN EACH NOSTRIL EVERY MORNING        gabapentin (NEURONTIN) 300 MG capsule Take 1 capsule (300 mg total) by mouth 3 (three) times daily. 60 capsule 1    montelukast (SINGULAIR) 10 mg tablet Take 10 mg by mouth.        pantoprazole (PROTONIX) 40 MG tablet Take 1 tablet by mouth every morning.        sertraline (ZOLOFT) 50 MG tablet Take 50 mg by mouth.        sotaloL (BETAPACE) 80 MG tablet Take 1 tablet (80 mg total) by mouth 2 (two) times daily. 60 tablet 1    traMADoL (ULTRAM) 50 mg tablet Take 1 tablet (50 mg total) by mouth every 8 (eight) hours as needed for Pain. 12 tablet 0         Allergies       Review of patient's allergies indicates:   Allergen Reactions    Tomato      Weed pollen           Physical Examination          Vitals:     12/11/24 0914   BP: 134/75    Pulse: 61      Pain Disability Index (PDI): 54     Spine Musculoskeletal Exam     Gait    Gait is normal.     Inspection    Thoracolumbar    Thoracolumbar inspection is normal.     Palpation    Thoracolumbar    Tenderness: present      Paraspinous: right    Right      Masses: none      Spasms: none      Crepitus: none      Muscle tone: normal    Left      Masses: none      Spasms: none      Crepitus: none      Muscle tone: normal     Range of Motion    Thoracolumbar        Thoracolumbar range of motion additional comments: Limited range of motion in the lumbar spine due to pain.     Strength    Thoracolumbar    Thoracolumbar motor exam is normal.        Sensory    Thoracolumbar    Thoracolumbar sensation is normal.     General      Constitutional: appears stated age, well-developed and well-nourished    Scleral icterus: no    Labored breathing: no    Psychiatric: normal mood and affect and no acute distress    Neurological: alert and oriented x3    Skin: intact    Lymphadenopathy: none  CV: extremities warm, well-perfused  Resp: nonlabored breathing        Assessment and Plan (including Health Maintenance)       Problem List   Smart Sets   Document Outside HM   :     Plan:   Chronic back pain greater than 3 months duration     Lumbar radiculopathy  -     Ambulatory referral/consult to Pain Clinic     Degeneration of intervertebral disc of lumbar region with discogenic back pain and lower extremity pain        She is being scheduled for bilateral L4 transforaminal epidural steroid injections today to help with her chronic low back pain consistent with findings of degenerative disc disease and spinal stenosis seen on her MRI.  The plan was discussed with the patient and she wishes to proceed.     Problem List Items Addressed This Visit                  Neuro     Lumbar radiculopathy     Degeneration of intervertebral disc of lumbar region with discogenic back pain and lower extremity pain          Orthopedic      Chronic back pain greater than 3 months duration - Primary

## 2025-01-13 NOTE — ANESTHESIA PREPROCEDURE EVALUATION
01/13/2025  Nel Recinos is a 74 y.o., female with   -------------------------------------    A-fib    Anxiety disorder, unspecified    Arthritis    High cholesterol    Osteoarthritis       And   ----------------------------    Appendectomy    Carpal tunnel release    Foot surgery    Hand surgery    Hysterectomy    Hysterectomy    Joint replacement    Robotic arthroplasty,hip,total,posterior approach    Procedure: ROBOTIC ARTHROPLASTY, HIP, TOTAL, POSTERIOR APPROACH;  Surgeon: Isidoro Waters MD;  Location: [facility];  Service: Orthopedics;  Laterality: Left;    Tubal ligation       Presents for TFESI L4-L5 bilateral     No previous pain procedures but has had OLENA in Nov 2023 after ground level fall on EliIotum    Pre-op Assessment    I have reviewed the NPO Status.      Review of Systems  Cardiovascular:                       Atrial Fibrillation     Musculoskeletal:  Arthritis        Arthritis          Neurological:    Neuromuscular Disease,           Arthritis                         Neuromuscular Disease   Psych:  Psychiatric History                     Anesthesia Plan  Type of Anesthesia, risks & benefits discussed:    Anesthesia Type: Gen Natural Airway  Intra-op Monitoring Plan: Standard ASA Monitors  Post Op Pain Control Plan: IV/PO Opioids PRN  Induction:  IV  Airway Plan: Direct  Informed Consent: Informed consent signed with the Patient and all parties understand the risks and agree with anesthesia plan.  All questions answered. Patient consented to blood products? No  ASA Score: 3  Day of Surgery Review of History & Physical: H&P Update referred to the surgeon/provider.  Anesthesia Plan Notes: Nasal cannula vs facemask supplemental oxygenation   For patients with GABRIELLE/obesity, may consider SuperNoval Nasal CPAP      Ready For Surgery From Anesthesia Perspective.     .

## 2025-01-13 NOTE — DISCHARGE SUMMARY
[facility] Surgical - Periop Services 2nd Floor  Discharge Note  Short Stay    Procedure(s) (LRB):  INJECTION, STEROID, EPIDURAL, TRANSFORAMINAL APPROACH     /////TFESI L4-L5 B/L (Bilateral)      OUTCOME: Patient tolerated treatment/procedure well without complication and is now ready for discharge.    DISPOSITION: Home or Self Care    FINAL DIAGNOSIS:  <principal problem not specified>    FOLLOWUP: In clinic    DISCHARGE INSTRUCTIONS:  No discharge procedures on file.     TIME SPENT ON DISCHARGE: 5 minutes

## 2025-01-13 NOTE — TRANSFER OF CARE
"Anesthesia Transfer of Care Note    Patient: Nel Recinos    Procedure(s) Performed: Procedure(s) (LRB):  INJECTION, STEROID, EPIDURAL, TRANSFORAMINAL APPROACH     /////TFESI L4-L5 B/L (Bilateral)    Patient location: OPS    Anesthesia Type: general    Transport from OR: Transported from OR on room air with adequate spontaneous ventilation    Post pain: adequate analgesia    Post assessment: no apparent anesthetic complications    Post vital signs: stable    Level of consciousness: awake, alert and oriented    Complications: none    Transfer of care protocol was followed      Last vitals: Visit Vitals  BP (!) 153/91   Pulse 66   Temp 36.4 °C (97.6 °F) (Oral)   Ht 5' 5" (1.651 m)   Wt 63 kg (139 lb)   SpO2 98%   Breastfeeding No   BMI 23.13 kg/m²     "

## 2025-01-14 VITALS
TEMPERATURE: 98 F | HEIGHT: 65 IN | WEIGHT: 139 LBS | DIASTOLIC BLOOD PRESSURE: 77 MMHG | HEART RATE: 61 BPM | SYSTOLIC BLOOD PRESSURE: 120 MMHG | BODY MASS INDEX: 23.16 KG/M2 | OXYGEN SATURATION: 98 %

## 2025-01-28 ENCOUNTER — OFFICE VISIT (OUTPATIENT)
Facility: CLINIC | Age: 75
End: 2025-01-28
Payer: MEDICARE

## 2025-01-28 VITALS
BODY MASS INDEX: 23.16 KG/M2 | HEART RATE: 54 BPM | HEIGHT: 65 IN | DIASTOLIC BLOOD PRESSURE: 73 MMHG | SYSTOLIC BLOOD PRESSURE: 121 MMHG | WEIGHT: 139 LBS

## 2025-01-28 DIAGNOSIS — M51.362 DEGENERATION OF INTERVERTEBRAL DISC OF LUMBAR REGION WITH DISCOGENIC BACK PAIN AND LOWER EXTREMITY PAIN: ICD-10-CM

## 2025-01-28 DIAGNOSIS — M54.9 CHRONIC BACK PAIN GREATER THAN 3 MONTHS DURATION: Primary | ICD-10-CM

## 2025-01-28 DIAGNOSIS — G89.29 CHRONIC BACK PAIN GREATER THAN 3 MONTHS DURATION: Primary | ICD-10-CM

## 2025-01-28 DIAGNOSIS — M54.16 LUMBAR RADICULOPATHY: ICD-10-CM

## 2025-01-28 PROCEDURE — 3288F FALL RISK ASSESSMENT DOCD: CPT | Mod: CPTII,,, | Performed by: ANESTHESIOLOGY

## 2025-01-28 PROCEDURE — 3078F DIAST BP <80 MM HG: CPT | Mod: CPTII,,, | Performed by: ANESTHESIOLOGY

## 2025-01-28 PROCEDURE — 1125F AMNT PAIN NOTED PAIN PRSNT: CPT | Mod: CPTII,,, | Performed by: ANESTHESIOLOGY

## 2025-01-28 PROCEDURE — 1101F PT FALLS ASSESS-DOCD LE1/YR: CPT | Mod: CPTII,,, | Performed by: ANESTHESIOLOGY

## 2025-01-28 PROCEDURE — 3008F BODY MASS INDEX DOCD: CPT | Mod: CPTII,,, | Performed by: ANESTHESIOLOGY

## 2025-01-28 PROCEDURE — 1159F MED LIST DOCD IN RCRD: CPT | Mod: CPTII,,, | Performed by: ANESTHESIOLOGY

## 2025-01-28 PROCEDURE — 3074F SYST BP LT 130 MM HG: CPT | Mod: CPTII,,, | Performed by: ANESTHESIOLOGY

## 2025-01-28 PROCEDURE — 1160F RVW MEDS BY RX/DR IN RCRD: CPT | Mod: CPTII,,, | Performed by: ANESTHESIOLOGY

## 2025-01-28 PROCEDURE — 99213 OFFICE O/P EST LOW 20 MIN: CPT | Mod: ,,, | Performed by: ANESTHESIOLOGY

## 2025-01-28 NOTE — PROGRESS NOTES
Linsey Reed MD        PATIENT NAME: Nel Recinos  : 1950  DATE: 25  MRN: [id number]      Billing Provider: Linsey Reed MD  Level of Service:   Patient PCP Information       Provider PCP Type    Priyank Eubanks Jr [facility]            Reason for Visit / Chief Complaint: Post-op Evaluation (Post op TFESI Isrrael L4-5 25 C/O pain level 5,  states procedure helped,states Rt lower side is hurting and feet still hurts. Taking Arthritis Tylenol for pain.)       Update PCP  Update Chief Complaint         History of Present Illness / Problem Focused Workflow     Nel Recinos presents to the clinic with Post-op Evaluation (Post op TFESI Isrrael L4-5 25 C/O pain level 5,  states procedure helped,states Rt lower side is hurting and feet still hurts. Taking Arthritis Tylenol for pain.)     This is a 74-year-old female who presents to clinic today for follow up of low back pain that started in 2024.  She had COVID and was coughing a lot, and that was when her back pain started.  She also has pain in her left leg that radiates down to the knee.  She states that she is not able to bend over to  something off floor and also has a hard time getting out of bed.  She is not able to take any NSAIDs because she is on Eliquis, but she does take Tylenol Arthritis.  She has not undergone any surgery on her lumbar spine.  She did some physical therapy several years ago, but nothing recent.  She underwent bilateral L4-5 transforaminal epidural steroid injections on .  She states that she is getting pretty good relief since then, approximately 50%.  Her pain was rated 10/10 on the NRS prior to the injections.  She currently rates her pain as 5/10, but states that it is increased today because she overdid it this weekend cleaning her house and moving plants.  She is also able to bend over better than she could before to pick things off the floor.        Pain        Review of Systems      Review of Systems   Musculoskeletal:  Positive for back pain and leg pain.   Psychiatric/Behavioral:  Positive for sleep disturbance.    All other systems reviewed and are negative.       Medical / Social / Family History     Past Medical History:   Diagnosis Date    A-fib     Anxiety disorder, unspecified     Arthritis     High cholesterol     Osteoarthritis        Past Surgical History:   Procedure Laterality Date    APPENDECTOMY  1979    CARPAL TUNNEL RELEASE Left     FOOT SURGERY      HAND SURGERY      hysterectomy      HYSTERECTOMY  2013    JOINT REPLACEMENT  2017    ROBOTIC ARTHROPLASTY,HIP,TOTAL,POSTERIOR APPROACH Left 11/27/2023    Procedure: ROBOTIC ARTHROPLASTY, HIP, TOTAL, POSTERIOR APPROACH;  Surgeon: Isidoro Waters MD;  Location: [facility];  Service: Orthopedics;  Laterality: Left;    TRANSFORAMINAL EPIDURAL INJECTION OF STEROID Bilateral 1/13/2025    Procedure: INJECTION, STEROID, EPIDURAL, TRANSFORAMINAL APPROACH     /////TFESI L4-L5 B/L;  Surgeon: Linsey Reed MD;  Location: [address] OR;  Service: Pain Management;  Laterality: Bilateral;  TFESI L4-L5 B/L    TUBAL LIGATION  1982       Social History  Ms. Recinos  reports that she has never smoked. She has never used smokeless tobacco. She reports that she does not currently use alcohol after a past usage of about 1.0 standard drink of alcohol per week. She reports that she does not use drugs.    Family History  Ms.'s Recinos family history includes Alcohol abuse in her mother and sister; Arthritis in her sister; Cancer in her sister; Stroke in her sister.    Medications and Allergies     Medications  Outpatient Medications Marked as Taking for the 1/28/25 encounter (Office Visit) with Linsey Reed MD   Medication Sig Dispense Refill    ELIQUIS 5 mg Tab Take 5 mg by mouth 2 (two) times daily.      ezetimibe (ZETIA) 10 mg tablet Take 10 mg by mouth.      fluticasone propionate (FLONASE) 50 mcg/actuation nasal spray USE 2 SPRAYS IN EACH  NOSTRIL EVERY MORNING      gabapentin (NEURONTIN) 300 MG capsule Take 1 capsule (300 mg total) by mouth 3 (three) times daily. 60 capsule 1    montelukast (SINGULAIR) 10 mg tablet Take 10 mg by mouth.      pantoprazole (PROTONIX) 40 MG tablet Take 1 tablet by mouth every morning.      sertraline (ZOLOFT) 50 MG tablet Take 50 mg by mouth.      [DISCONTINUED] traMADoL (ULTRAM) 50 mg tablet Take 1 tablet (50 mg total) by mouth every 8 (eight) hours as needed for Pain. 12 tablet 0       Allergies  Review of patient's allergies indicates:   Allergen Reactions    Tomato     Weed pollen        Physical Examination     Vitals:    01/28/25 1102   BP: 121/73   Pulse: (!) 54             Spine Musculoskeletal Exam    Gait    Gait is normal.    Inspection    Thoracolumbar    Thoracolumbar inspection is normal.    Palpation    Thoracolumbar    Tenderness: present      Paraspinous: right    Right      Masses: none      Spasms: none      Crepitus: none      Muscle tone: normal    Left      Masses: none      Spasms: none      Crepitus: none      Muscle tone: normal    Range of Motion    Thoracolumbar        Thoracolumbar range of motion additional comments: Limited range of motion in the lumbar spine due to pain.    Strength    Thoracolumbar    Thoracolumbar motor exam is normal.       Sensory    Thoracolumbar    Thoracolumbar sensation is normal.    General      Constitutional: appears stated age, well-developed and well-nourished    Scleral icterus: no    Labored breathing: no    Psychiatric: normal mood and affect and no acute distress    Neurological: alert and oriented x3    Skin: intact    Lymphadenopathy: none  CV: extremities warm, well-perfused  Resp: nonlabored breathing       Assessment and Plan (including Health Maintenance)      Problem List  Smart Sets  Document Outside HM   :    Plan:   Chronic back pain greater than 3 months duration    Degeneration of intervertebral disc of lumbar region with discogenic back pain  and lower extremity pain    Lumbar radiculopathy       She is doing well at this time and getting good relief from her injections done a couple of weeks ago.  She will be following up with her spine surgeon in March.  I will follow up with her again in 3 months.    Problem List Items Addressed This Visit          Neuro    Lumbar radiculopathy    Degeneration of intervertebral disc of lumbar region with discogenic back pain and lower extremity pain       Orthopedic    Chronic back pain greater than 3 months duration - Primary         Future Appointments   Date Time Provider Department Center   3/10/2025  9:45 AM Francisco Du MD [facility] [place]   4/28/2025 10:00 AM Linsey Reed MD [facility] [place]   5/27/2025  2:00 PM Isidoro Waters MD [facility]        There are no Patient Instructions on file for this visit.  Follow up in about 3 months (around 4/28/2025).     Signature:  Linsey Reed MD      Date of encounter: 1/28/25

## 2025-03-10 ENCOUNTER — OFFICE VISIT (OUTPATIENT)
Dept: ORTHOPEDIC SURGERY | Facility: CLINIC | Age: 75
End: 2025-03-10
Payer: MEDICARE

## 2025-03-10 DIAGNOSIS — M51.360 DEGENERATION OF INTERVERTEBRAL DISC OF LUMBAR REGION WITH DISCOGENIC BACK PAIN: ICD-10-CM

## 2025-03-10 DIAGNOSIS — M54.16 LUMBAR RADICULOPATHY: ICD-10-CM

## 2025-03-10 DIAGNOSIS — M47.816 LUMBAR SPONDYLOSIS: Primary | ICD-10-CM

## 2025-03-10 PROCEDURE — 3288F FALL RISK ASSESSMENT DOCD: CPT | Mod: CPTII,S$GLB,, | Performed by: ORTHOPAEDIC SURGERY

## 2025-03-10 PROCEDURE — 1159F MED LIST DOCD IN RCRD: CPT | Mod: CPTII,S$GLB,, | Performed by: ORTHOPAEDIC SURGERY

## 2025-03-10 PROCEDURE — 1126F AMNT PAIN NOTED NONE PRSNT: CPT | Mod: CPTII,S$GLB,, | Performed by: ORTHOPAEDIC SURGERY

## 2025-03-10 PROCEDURE — 1160F RVW MEDS BY RX/DR IN RCRD: CPT | Mod: CPTII,S$GLB,, | Performed by: ORTHOPAEDIC SURGERY

## 2025-03-10 PROCEDURE — 99214 OFFICE O/P EST MOD 30 MIN: CPT | Mod: S$GLB,,, | Performed by: ORTHOPAEDIC SURGERY

## 2025-03-10 PROCEDURE — 1101F PT FALLS ASSESS-DOCD LE1/YR: CPT | Mod: CPTII,S$GLB,, | Performed by: ORTHOPAEDIC SURGERY

## 2025-03-10 NOTE — PROGRESS NOTES
DATE: 3/10/2025  PATIENT: Nel Recinos    Attending Physician: Francisco Du M.D.    HISTORY:  Nel Recinos is a 74 y.o. female who returns to me today for FU after L4-5 ANISHA on 1/13/25, which helped 60% of her pain. Patient continues to have LBP that radiates down LLE to the thigh. She has been taking meds with some relief. She would like to continue conservative management.     The patient denies myelopathic symptoms such as handwriting changes or difficulty with buttons/coins/keys. Denies perineal paresthesias, bowel/bladder dysfunction.    PMH/PSH/FamHx/SocHx:  Unchanged from prior visit    ROS:  Positive for neck and LBP/LLE pain  Denies perineal paresthesias, bowel or bladder incontinence    EXAM:  There were no vitals taken for this visit.    My physical examination was notable for the following findings: motor intact BUE and BLE; SILT    IMAGING:  Today I independently reviewed the following images and my interpretations are as follows:    Previous L-spine XRs showed spondylosis and DDD worst at L4-5.    Lumbar MRI showed L3-4 mod central and L4-5 severe central stenosis.    ASSESSMENT/PLAN:  Patient has lumbar spondylosis and stenosis with LLE radiculopathy. I educated the patient on the importance of core/back strengthening, correct posture, bending/lifting ergonomics, and low-impact aerobic exercises (walking, elliptical, and aquatherapy). I prescribed gabapentin. Continue medications. RTC PRN. Patient is a candidate for L4-5 PLDF/TLIF (L) if she fails conservative management.    I provided the patient with a home exercise program. It is the [employer] spine conditioning program. Exercises include head rolls, kneeling back extension, sitting rotation stretch, modified seated side straddle, knee to chest, bird dog, plank, modified seated plank, hip bridges, abdominal bracing, and abdominal crunch. Pt will complete each exercise 5 times daily for 6-8 weeks.    Francisco Du MD  Orthopaedic Spine  Surgeon  Department of Orthopaedic Surgery  [phone]

## 2025-05-05 ENCOUNTER — TELEPHONE (OUTPATIENT)
Dept: FAMILY MEDICINE | Facility: CLINIC | Age: 75
End: 2025-05-05
Payer: MEDICARE

## 2025-05-13 ENCOUNTER — OFFICE VISIT (OUTPATIENT)
Dept: FAMILY MEDICINE | Facility: CLINIC | Age: 75
End: 2025-05-13
Payer: MEDICARE

## 2025-05-13 VITALS
BODY MASS INDEX: 23.66 KG/M2 | TEMPERATURE: 98 F | RESPIRATION RATE: 19 BRPM | SYSTOLIC BLOOD PRESSURE: 122 MMHG | HEIGHT: 65 IN | DIASTOLIC BLOOD PRESSURE: 72 MMHG | HEART RATE: 67 BPM | WEIGHT: 142 LBS | OXYGEN SATURATION: 96 %

## 2025-05-13 DIAGNOSIS — I48.11 LONGSTANDING PERSISTENT ATRIAL FIBRILLATION: Primary | ICD-10-CM

## 2025-05-13 DIAGNOSIS — Z79.01 CURRENT USE OF LONG TERM ANTICOAGULATION: ICD-10-CM

## 2025-05-13 DIAGNOSIS — M54.9 CHRONIC BACK PAIN GREATER THAN 3 MONTHS DURATION: ICD-10-CM

## 2025-05-13 DIAGNOSIS — K21.9 GASTROESOPHAGEAL REFLUX DISEASE WITHOUT ESOPHAGITIS: ICD-10-CM

## 2025-05-13 DIAGNOSIS — E78.5 HYPERLIPIDEMIA, UNSPECIFIED HYPERLIPIDEMIA TYPE: ICD-10-CM

## 2025-05-13 DIAGNOSIS — M25.522 LEFT ELBOW PAIN: ICD-10-CM

## 2025-05-13 DIAGNOSIS — G89.29 CHRONIC BACK PAIN GREATER THAN 3 MONTHS DURATION: ICD-10-CM

## 2025-05-13 DIAGNOSIS — Z78.0 POST-MENOPAUSAL: ICD-10-CM

## 2025-05-13 DIAGNOSIS — M25.542 PAIN, JOINT, HAND, LEFT: ICD-10-CM

## 2025-05-13 DIAGNOSIS — Z12.31 ENCOUNTER FOR SCREENING MAMMOGRAM FOR MALIGNANT NEOPLASM OF BREAST: ICD-10-CM

## 2025-05-13 RX ORDER — METHYLPREDNISOLONE 4 MG/1
TABLET ORAL
Qty: 21 EACH | Refills: 0 | Status: SHIPPED | OUTPATIENT
Start: 2025-05-13

## 2025-05-13 NOTE — PROGRESS NOTES
Subjective:      Patient ID: Nel Recinos is a 74 y.o. White female      Chief Complaint: Establish Care       Past Medical History:   Diagnosis Date    A-fib     Anxiety disorder, unspecified     Arthritis     Current use of long term anticoagulation 05/13/2025    GERD (gastroesophageal reflux disease)     High cholesterol     HLD (hyperlipidemia)     Osteoarthritis         HPI  History of Present Illness      Presents to clinic in order to establish care with Dr. Siddiqui.  Previous patient of Dr. Priyank Eubanks.      Left Elbow Pain; Joint Pain of Left Hand  Ms. Recinos reports severe pain in her left hand joints, which appears swollen. She has also been having trouble with her left elbow for the past 3-4 weeks, with severe pain when attempting to lift objects. She was previously taking Meloxicam for arthritis but discontinued it when she started Eliquis. She expresses concern about the arthritis pain and is seeking treatment options.    Chronic Lower Back Pain  She had spinal injections with Dr. Linsey Reed for chronic back pain, which has significantly improved. She reports being very active but has pain the following day if she overexerts herself. She typically takes Tylenol for arthritis to manage this pain.     She also follows Dr. Du, Orthopedic Surgeon.  She was prescribed gabapentin for back pain and radiculopathy symptoms but has not taken it due to concerns about its effects.    A-Fib  Regarding her atrial fibrillation, she was diagnosed approximately 10 years ago, discovered incidentally during a procedure when she was unaware of having any symptoms. She is currently on Eliquis 5 mg twice daily and Sotalol 80 mg twice daily for management.      She mentions occasional shortness of breath, particularly during exertion or high activity levels, and intermittent sensations of swelling in her feet, although they do not appear visibly enlarged.    She denies chest pain, visible swelling in the feet, or  any cardiac procedures other than a stress test. She denies having a current cardiologist.    HLD  She is on Ezetimibe for cholesterol. Ms. Recinos discontinued Rosuvastatin for cholesterol due to side effects when the dose was doubled.    GERD  She has acid reflux, which is mostly controlled with pantoprazole. Occasionally, when it becomes more severe, she takes Pepcid for additional relief. She notes an allergy to tomatoes, which she discovered through allergy testing and now associates with her acid reflux symptoms.    Seasonal Allergies  She is currently on Singulair and Flonase for seasonal allergies.  States stability of symptoms.    FAMILY HISTORY:  Family history is significant for mother with alcohol abuse. One sister has a history of alcohol abuse, stroke, and arthritis. Another sister had breast cancer and recently passed away, possibly due to a stroke. Ms. Recinos's children have thyroid problems.    SURGICAL HISTORY:  Ms. Recinos has undergone multiple surgeries including an appendectomy, carpal tunnel surgery, foot surgery, hand surgery, hysterectomy, joint replacement (right thumb), and total hip replacement. She has also had spine injections, with recent spinal injections performed by Dr. Patsy Storm.    ALLERGIES:  Ms. Recinos is allergic to tomatoes, which causes acid reflux.    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  Negative for appetite change, chills and fever.   HENT: Negative.     Eyes: Negative.  Negative for discharge and redness.   Respiratory: Negative.  Negative for shortness of breath.    Cardiovascular: Negative.  Negative for chest pain.   Gastrointestinal: Negative.    Endocrine: Negative.     Genitourinary: Negative.    Integumentary:  Negative.   Allergic/Immunologic: Negative.    Neurological: Negative.    Psychiatric/Behavioral: Negative.     All other systems reviewed and are negative.        Objective:      Vitals:    05/13/25 0916   BP: 122/72   Pulse: 67   Resp: 19   Temp: 97.6 °F (36.4 °C)      Body mass index is 23.63 kg/m².     Physical Exam  Vitals reviewed.   Constitutional:       Appearance: Normal appearance.   HENT:      Head: Normocephalic.      Mouth/Throat:      Mouth: Mucous membranes are moist.   Eyes:      Conjunctiva/sclera: Conjunctivae normal.      Pupils: Pupils are equal, round, and reactive to light.   Cardiovascular:      Rate and Rhythm: Normal rate and regular rhythm.   Pulmonary:      Effort: Pulmonary effort is normal. No respiratory distress.      Breath sounds: Normal breath sounds.   Musculoskeletal:         General: Normal range of motion.      Cervical back: Normal range of motion and neck supple.      Comments: Edema of 2nd, 3rd, and 4th MCP joints of left hand   Skin:     General: Skin is warm and dry.   Neurological:      Mental Status: She is alert and oriented to person, place, and time.   Psychiatric:         Mood and Affect: Mood normal.          Current Medications[1]    Assessment & Plan:   Assessment & Plan      IMPRESSION:  - Assessed a-fib, currently managed with Eliquis and Sotalol.  - Evaluated chronic back pain, noting improvement after recent spinal injections.  - Considered options for managing arthritis pain given anticoagulation therapy.  - Noted elevated cholesterol levels, particularly triglycerides, since discontinuation of statin therapy.  - Plan to reassess cholesterol management at next visit with primary care.    ATRIAL FIBRILLATION:  - Referred the patient to cardiology for management of atrial fibrillation.  - Ms. Recinos is currently on Eliquis 5 mg twice daily and Sotalol 80 mg twice daily.  - Heart rate is stable.  - Recommend cardiac  follow-up due to history of a-fib and occasional shortness of breath.    HYPERLIPIDEMIA:  - Documented elevated cholesterol levels: total cholesterol 224, , triglycerides 284 in August 2024.  - Noted the patient was previously on Rosuvastatin but now on non-statin medication due to side effects.  - Discussed hyperlipidemia management and possible return to statin therapy.    Left Hand Joint Pain  - Observed swelling and noted severe pain in the patient's left hand.  - Started Methylprednisolone Dosepak for 6 days and continued Tylenol arthritis as needed for pain management.  - Considered steroid injection and possible orthopedic intervention for left hand pain and swelling.  - Instructed patient to contact the office if they decide to proceed with orthopedic evaluation for joint injections.    Left Elbow Pain  - Ms. Recinos reports severe pain in left elbow, unable to  anything without causing severe pain.  - Started Methylprednisolone Dosepak for 6 days (same course as for left hand) and continued Tylenol arthritis as needed for pain management.  - Considered steroid injection and discussed possible orthopedic intervention.  - Instructed patient to contact the office if they decide to proceed with orthopedic evaluation for joint injections.    Chronic Back Pain  - Ms. Recinos reports burning sensation in leg associated with lumbar radiculopathy.  - Documented that pain improved after spinal injections.  - Discussed lumbar radiculopathy symptoms and management.  - Instructed to keep appt with Dr. Polanco and Ortho surgeon as needed    GASTROESOPHAGEAL REFLUX DISEASE (GERD):  - Continue Pantoprazole for acid reflux management.  - Noted the patient occasionally takes Pepcid when symptoms worsen.  - Discussed acid reflux management and avoidance of trigger foods.    ALLERGIC RHINITIS:  - Continue Singulair and Flonase for seasonal allergies management.  - Noted the patient's seasonal allergies are  currently well-controlled with current medication regimen.      ANTICOAGULANT USE:  - Explained increased bleeding risk associated with combining NSAIDs and anticoagulants; opted to avoid NSAIDS    FOLLOW-UP AND TESTS:  - Ordered mammogram and DEXA scan.  - Follow up in about 2 months with Dr. Siddiqui for Wellness with labs prior  - Complete lab work 2 days to 1 week before the appointment with Dr. Rose.        Problem List Items Addressed This Visit          Cardiac/Vascular    A-fib - Primary    Relevant Orders    Ambulatory referral/consult to Cardiology    HLD (hyperlipidemia)       Hematology    Current use of long term anticoagulation       GI    GERD (gastroesophageal reflux disease)       Orthopedic    Chronic back pain greater than 3 months duration    Left elbow pain    Relevant Medications    methylPREDNISolone (MEDROL DOSEPACK) 4 mg tablet    Pain, joint, hand, left    Relevant Medications    methylPREDNISolone (MEDROL DOSEPACK) 4 mg tablet     Other Visit Diagnoses         Encounter for screening mammogram for malignant neoplasm of breast        Relevant Orders    Mammo Digital Screening Bilat w/ Harrisno (XPD)      Post-menopausal        Relevant Orders    DXA Bone Density Axial Skeleton 1 or more sites              This note was generated with the assistance of ambient listening technology. Verbal consent was obtained by the patient and accompanying visitor(s) for the recording of patient appointment to facilitate this note. I attest to having reviewed and edited the generated note for accuracy, though some syntax or spelling errors may persist. Please contact the author of this note for any clarification.            [1]   Current Outpatient Medications:     ELIQUIS 5 mg Tab, Take 5 mg by mouth 2 (two) times daily., Disp: , Rfl:     ezetimibe (ZETIA) 10 mg tablet, Take 10 mg by mouth., Disp: , Rfl:     fluticasone propionate (FLONASE) 50 mcg/actuation nasal spray, USE 2 SPRAYS IN EACH NOSTRIL EVERY MORNING,  Disp: , Rfl:     montelukast (SINGULAIR) 10 mg tablet, Take 10 mg by mouth., Disp: , Rfl:     pantoprazole (PROTONIX) 40 MG tablet, Take 1 tablet by mouth every morning., Disp: , Rfl:     sertraline (ZOLOFT) 50 MG tablet, Take 50 mg by mouth., Disp: , Rfl:     sotaloL (BETAPACE) 80 MG tablet, Take 1 tablet (80 mg total) by mouth 2 (two) times daily., Disp: 60 tablet, Rfl: 1    gabapentin (NEURONTIN) 300 MG capsule, Take 1 capsule (300 mg total) by mouth 3 (three) times daily., Disp: 60 capsule, Rfl: 1    methylPREDNISolone (MEDROL DOSEPACK) 4 mg tablet, use as directed, Disp: 21 each, Rfl: 0

## 2025-05-15 ENCOUNTER — OFFICE VISIT (OUTPATIENT)
Facility: CLINIC | Age: 75
End: 2025-05-15
Payer: MEDICARE

## 2025-05-15 VITALS
WEIGHT: 142 LBS | DIASTOLIC BLOOD PRESSURE: 72 MMHG | HEIGHT: 65 IN | BODY MASS INDEX: 23.66 KG/M2 | HEART RATE: 87 BPM | SYSTOLIC BLOOD PRESSURE: 129 MMHG

## 2025-05-15 DIAGNOSIS — M54.16 LUMBAR RADICULOPATHY: ICD-10-CM

## 2025-05-15 DIAGNOSIS — G89.29 CHRONIC BACK PAIN GREATER THAN 3 MONTHS DURATION: ICD-10-CM

## 2025-05-15 DIAGNOSIS — M54.9 CHRONIC BACK PAIN GREATER THAN 3 MONTHS DURATION: ICD-10-CM

## 2025-05-15 DIAGNOSIS — M51.362 DEGENERATION OF INTERVERTEBRAL DISC OF LUMBAR REGION WITH DISCOGENIC BACK PAIN AND LOWER EXTREMITY PAIN: Primary | ICD-10-CM

## 2025-05-15 NOTE — PROGRESS NOTES
Linsey Reed MD        PATIENT NAME: Nel Recinos  : 1950  DATE: 5/15/25  MRN: [id number]      Billing Provider: Linsey Reed MD  Level of Service:   Patient PCP Information       Provider PCP Type    Priyank Eubanks Jr General            Reason for Visit / Chief Complaint: chronic back (Pain level today is 0/10/Gabapentin is working fine )       Update PCP  Update Chief Complaint         History of Present Illness / Problem Focused Workflow     Nel Recinos presents to the clinic with chronic back (Pain level today is 0/10/Gabapentin is working fine )     This is a 74-year-old female who presents to clinic today for follow up of low back pain that started in 2024.  She had COVID and was coughing a lot, and that was when her back pain started.  She also has pain in her left leg that radiates down to the knee.  She states that she is not able to bend over to  something off floor and also has a hard time getting out of bed.  She is not able to take any NSAIDs because she is on Eliquis, but she does take Tylenol Arthritis.  She has not undergone any surgery on her lumbar spine.  She did some physical therapy several years ago, but nothing recent.  She underwent bilateral L4-5 transforaminal epidural steroid injections on 2024.  She states that she is getting pretty good relief since then, and continues to improve.  Her pain was rated 10/10 on the NRS prior to the injections.  She actually does not have any pain at all right now.      Pain        Review of Systems     Review of Systems   Musculoskeletal:  Positive for back pain and leg pain.   Psychiatric/Behavioral:  Positive for sleep disturbance.    All other systems reviewed and are negative.       Medical / Social / Family History     Past Medical History:   Diagnosis Date    A-fib     Anxiety disorder, unspecified     Arthritis     Current use of long term anticoagulation 2025    GERD (gastroesophageal reflux  disease)     High cholesterol     HLD (hyperlipidemia)     Osteoarthritis        Past Surgical History:   Procedure Laterality Date    APPENDECTOMY  1979    CARPAL TUNNEL RELEASE Left     FOOT SURGERY      HAND SURGERY      hysterectomy      HYSTERECTOMY  2013    JOINT REPLACEMENT  2017    Right Thumb    ROBOTIC ARTHROPLASTY,HIP,TOTAL,POSTERIOR APPROACH Left 11/27/2023    Procedure: ROBOTIC ARTHROPLASTY, HIP, TOTAL, POSTERIOR APPROACH;  Surgeon: Isidoro Waters MD;  Location: [facility] OR;  Service: Orthopedics;  Laterality: Left;    TRANSFORAMINAL EPIDURAL INJECTION OF STEROID Bilateral 01/13/2025    Procedure: INJECTION, STEROID, EPIDURAL, TRANSFORAMINAL APPROACH     /////TFESI L4-L5 B/L;  Surgeon: Linsey Reed MD;  [address];  Service: Pain Management;  Laterality: Bilateral;  TFESI L4-L5 B/L    TUBAL LIGATION  1982       Social History  Ms. Recinos  reports that she has never smoked. She has never used smokeless tobacco. She reports current alcohol use of about 1.0 standard drink of alcohol per week. She reports that she does not use drugs.    Family History  Ms.'s Recinos family history includes Alcohol abuse in her mother and sister; Arthritis in her sister; Breast cancer in her sister; Stroke in her sister.    Medications and Allergies     Medications  Outpatient Medications Marked as Taking for the 5/15/25 encounter (Office Visit) with Linsey Reed MD   Medication Sig Dispense Refill    ELIQUIS 5 mg Tab Take 5 mg by mouth 2 (two) times daily.      ezetimibe (ZETIA) 10 mg tablet Take 10 mg by mouth.      fluticasone propionate (FLONASE) 50 mcg/actuation nasal spray USE 2 SPRAYS IN EACH NOSTRIL EVERY MORNING      gabapentin (NEURONTIN) 300 MG capsule Take 1 capsule (300 mg total) by mouth 3 (three) times daily. 60 capsule 1    montelukast (SINGULAIR) 10 mg tablet Take 10 mg by mouth.      pantoprazole (PROTONIX) 40 MG tablet Take 1 tablet by mouth every morning.      sertraline (ZOLOFT) 50  MG tablet Take 50 mg by mouth.      sotaloL (BETAPACE) 80 MG tablet Take 1 tablet (80 mg total) by mouth 2 (two) times daily. 60 tablet 1       Allergies  Review of patient's allergies indicates:   Allergen Reactions    Tomato     Weed pollen        Physical Examination     Vitals:    05/15/25 1534   BP: 129/72   Pulse: 87     Pain Disability Index (PDI): 8       Spine Musculoskeletal Exam    Gait    Gait is normal.    Inspection    Thoracolumbar    Thoracolumbar inspection is normal.    Palpation    Thoracolumbar    Tenderness: present      Paraspinous: right    Right      Masses: none      Spasms: none      Crepitus: none      Muscle tone: normal    Left      Masses: none      Spasms: none      Crepitus: none      Muscle tone: normal    Range of Motion    Thoracolumbar        Thoracolumbar range of motion additional comments: Limited range of motion in the lumbar spine due to pain.    Strength    Thoracolumbar    Thoracolumbar motor exam is normal.       Sensory    Thoracolumbar    Thoracolumbar sensation is normal.    General      Constitutional: appears stated age, well-developed and well-nourished    Scleral icterus: no    Labored breathing: no    Psychiatric: normal mood and affect and no acute distress    Neurological: alert and oriented x3    Skin: intact    Lymphadenopathy: none  CV: extremities warm, well-perfused  Resp: nonlabored breathing       Assessment and Plan (including Health Maintenance)      Problem List  Smart Sets  Document Outside HM   :    Plan:   Degeneration of intervertebral disc of lumbar region with discogenic back pain and lower extremity pain    Lumbar radiculopathy    Chronic back pain greater than 3 months duration         She is doing well at this time and getting good relief from her injections done in January this year.  She will follow up again in 6 months or sooner if needed.      Problem List Items Addressed This Visit          Neuro    Lumbar radiculopathy    Degeneration of  intervertebral disc of lumbar region with discogenic back pain and lower extremity pain - Primary       Orthopedic    Chronic back pain greater than 3 months duration           Future Appointments   Date Time Provider Department Center   5/27/2025  2:00 PM Isidoro Waters MD [facility] MARY [place]   5/27/2025  3:45 PM [facility] [id number] S[facility] [place]   8/14/2025  3:00 PM Minda Siddiqui,  [facility] [place]   11/6/2025  3:30 PM Linsey Reed MD [facility] PAINMD [place]         There are no Patient Instructions on file for this visit.  Follow up in about 6 months (around 11/15/2025).     Signature:  Linsey Reed MD      Date of encounter: 5/15/25

## 2025-06-30 RX ORDER — MONTELUKAST SODIUM 10 MG/1
10 TABLET ORAL NIGHTLY
Qty: 90 TABLET | Refills: 3 | Status: SHIPPED | OUTPATIENT
Start: 2025-06-30

## 2025-06-30 RX ORDER — EZETIMIBE 10 MG/1
10 TABLET ORAL DAILY
Qty: 90 TABLET | Refills: 3 | Status: SHIPPED | OUTPATIENT
Start: 2025-06-30 | End: 2026-02-10

## 2025-06-30 NOTE — TELEPHONE ENCOUNTER
Copied from CRM #[id number]. Topic: Medications - Medication Refill  >> Jun 30, 2025 10:03 AM Vicenta wrote:  Who Called: Nel Recinos    Refill or New Rx:Refill    RX Name and Strength:    ezetimibe (ZETIA) 10 mg tablet  montelukast (SINGULAIR) 10 mg tablet    How is the patient currently taking it? (ex. 1XDay): n/a    Is this a 30 day or 90 day RX: n/a     Local or Mail Order: LOCAL    List of preferred pharmacies on file (remove unneeded): [id number] - [place] - [facility]   Phone: [phone]  Fax: [phone]    Ordering Provider: N/A    Preferred Method of Contact: Phone Call    Patient's Preferred Phone Number on File: [phone]     Best Call Back Number, if different: N/A    Additional Information:  pt needs the above Rxs refilled

## 2025-07-07 ENCOUNTER — HOSPITAL ENCOUNTER (OUTPATIENT)
Dept: RADIOLOGY | Facility: HOSPITAL | Age: 75
Discharge: HOME | End: 2025-07-07
Attending: NURSE PRACTITIONER
Payer: MEDICARE

## 2025-07-07 ENCOUNTER — OFFICE VISIT (OUTPATIENT)
Dept: FAMILY MEDICINE | Facility: CLINIC | Age: 75
End: 2025-07-07
Payer: MEDICARE

## 2025-07-07 ENCOUNTER — RESULTS FOLLOW-UP (OUTPATIENT)
Dept: FAMILY MEDICINE | Facility: CLINIC | Age: 75
End: 2025-07-07

## 2025-07-07 VITALS
DIASTOLIC BLOOD PRESSURE: 64 MMHG | HEART RATE: 81 BPM | WEIGHT: 143.5 LBS | TEMPERATURE: 99 F | BODY MASS INDEX: 23.91 KG/M2 | OXYGEN SATURATION: 97 % | RESPIRATION RATE: 19 BRPM | HEIGHT: 65 IN | SYSTOLIC BLOOD PRESSURE: 118 MMHG

## 2025-07-07 DIAGNOSIS — J06.9 UPPER RESPIRATORY TRACT INFECTION, UNSPECIFIED TYPE: Primary | ICD-10-CM

## 2025-07-07 DIAGNOSIS — J06.9 UPPER RESPIRATORY TRACT INFECTION, UNSPECIFIED TYPE: ICD-10-CM

## 2025-07-07 PROCEDURE — 99213 OFFICE O/P EST LOW 20 MIN: CPT | Mod: ,,, | Performed by: NURSE PRACTITIONER

## 2025-07-07 PROCEDURE — 1159F MED LIST DOCD IN RCRD: CPT | Mod: CPTII,,, | Performed by: NURSE PRACTITIONER

## 2025-07-07 PROCEDURE — 1125F AMNT PAIN NOTED PAIN PRSNT: CPT | Mod: CPTII,,, | Performed by: NURSE PRACTITIONER

## 2025-07-07 PROCEDURE — 1160F RVW MEDS BY RX/DR IN RCRD: CPT | Mod: CPTII,,, | Performed by: NURSE PRACTITIONER

## 2025-07-07 PROCEDURE — 3074F SYST BP LT 130 MM HG: CPT | Mod: CPTII,,, | Performed by: NURSE PRACTITIONER

## 2025-07-07 PROCEDURE — 3008F BODY MASS INDEX DOCD: CPT | Mod: CPTII,,, | Performed by: NURSE PRACTITIONER

## 2025-07-07 PROCEDURE — 3078F DIAST BP <80 MM HG: CPT | Mod: CPTII,,, | Performed by: NURSE PRACTITIONER

## 2025-07-07 PROCEDURE — 71046 X-RAY EXAM CHEST 2 VIEWS: CPT | Mod: TC

## 2025-07-07 NOTE — PROGRESS NOTES
Subjective:      Patient ID: Nel Recinos is a 74 y.o. White female      Chief Complaint: HA, Sore Throat, Congestion, Body Aches       Past Medical History:   Diagnosis Date    A-fib     Anxiety disorder, unspecified     Arthritis     Current use of long term anticoagulation 05/13/2025    GERD (gastroesophageal reflux disease)     High cholesterol     HLD (hyperlipidemia)     Osteoarthritis         HPI  History of Present Illness      SORE THROAT  Ms. Recinos reports feeling unwell since yesterday with fatigue, headaches, sore throat, body aches, and congestion. She describes her condition as congested but without a pronounced cough. Her temperature was recorded as 98.9 today, and she denies having a fever.    She recently traveled to [place] and back, but is unsure if she was exposed to anyone who was visibly ill during her trip. She has not taken any OTC medications for symptoms today. She reports a history of COVID infection last July and notes that current symptoms feel similar to that previous illness.    She mentions having had pneumonia in the past.    She denies significant cough, wheezing, shortness of breath, or chest pain           Patient Care Team:  Minda Siddiqui DO as PCP - General (Family Medicine)  Francisco Du MD as Consulting Physician (Orthopedic Surgery)  Linsey Reed MD as Consulting Physician (Pain Medicine)  Rishi Staton MD as Consulting Physician (Cardiology)    Review of Systems   Constitutional: Negative.  Negative for appetite change, chills and fever.   HENT: Negative.     Eyes: Negative.  Negative for discharge and redness.   Respiratory: Negative.  Negative for shortness of breath.    Cardiovascular: Negative.  Negative for chest pain.   Gastrointestinal: Negative.    Endocrine: Negative.    Genitourinary: Negative.    Integumentary:  Negative.   Allergic/Immunologic: Negative.    Neurological: Negative.    Psychiatric/Behavioral: Negative.     All other systems  reviewed and are negative.        Objective:      Vitals:    07/07/25 1250   BP: 118/64   Pulse: 81   Resp: 19   Temp: 98.9 °F (37.2 °C)      Body mass index is 23.88 kg/m².     Physical Exam  Vitals reviewed.   Constitutional:       Appearance: Normal appearance.   HENT:      Head: Normocephalic.      Mouth/Throat:      Mouth: Mucous membranes are moist.   Eyes:      Conjunctiva/sclera: Conjunctivae normal.      Pupils: Pupils are equal, round, and reactive to light.   Cardiovascular:      Rate and Rhythm: Normal rate and regular rhythm.   Pulmonary:      Effort: Pulmonary effort is normal. No respiratory distress.      Breath sounds: Normal breath sounds. No wheezing or rhonchi.      Comments: Few scattered crackles bilateral lower bases  Musculoskeletal:         General: Normal range of motion.      Cervical back: Normal range of motion and neck supple.   Skin:     General: Skin is warm and dry.   Neurological:      Mental Status: She is alert and oriented to person, place, and time.   Psychiatric:         Mood and Affect: Mood normal.          Current Medications[1]    Assessment & Plan:   Assessment & Plan      IMPRESSION:  - Assessed patient presenting with fatigue, headaches, sore throat, body aches, and congestion.    URI  - Noted patient had COVID-19 in July of last year with similar symptoms.  - Recently traveled to [place] and back, with uncertainty about exposure to sick individuals.  - Ordered chest radiograph to rule out pneumonia or pleural effusion and COVID-19 test due to symptom presentation.  - Will contact patient with test results by end of day or next morning.  - Instructed to continue to monitor symptoms and to report to ED for any CP, SOB, and/or worsening symptoms  - Pt is agreeable to plan and verbalizes understanding      FOLLOW-UP:  - Follow up on the 14th of next month as scheduled for regular visit with Dr. Siddiqui       Problem List Items Addressed This Visit    None  Visit Diagnoses          Upper respiratory tract infection, unspecified type    -  Primary    Relevant Orders    X-Ray Chest PA And Lateral    COVID/RSV/FLU A&B PCR              This note was generated with the assistance of ambient listening technology. Verbal consent was obtained by the patient and accompanying visitor(s) for the recording of patient appointment to facilitate this note. I attest to having reviewed and edited the generated note for accuracy, though some syntax or spelling errors may persist. Please contact the author of this note for any clarification.    Total time (20 minutes).This includes face to face time and non-face to face time preparing to see the patient (eg, review of tests), obtaining and/or reviewing separately obtained history, documenting clinical information in the electronic or other health record, independently interpreting results and communicating results to the patient/family/caregiver, or care coordinator.              [1]   Current Outpatient Medications:     ELIQUIS 5 mg Tab, Take 5 mg by mouth 2 (two) times daily., Disp: , Rfl:     ezetimibe (ZETIA) 10 mg tablet, Take 1 tablet (10 mg total) by mouth once daily., Disp: 90 tablet, Rfl: 3    fluticasone propionate (FLONASE) 50 mcg/actuation nasal spray, USE 2 SPRAYS IN EACH NOSTRIL EVERY MORNING, Disp: , Rfl:     montelukast (SINGULAIR) 10 mg tablet, Take 1 tablet (10 mg total) by mouth every evening., Disp: 90 tablet, Rfl: 3    pantoprazole (PROTONIX) 40 MG tablet, Take 1 tablet by mouth every morning., Disp: , Rfl:     sertraline (ZOLOFT) 50 MG tablet, Take 50 mg by mouth., Disp: , Rfl:     sotaloL (BETAPACE) 80 MG tablet, Take 1 tablet (80 mg total) by mouth 2 (two) times daily., Disp: 60 tablet, Rfl: 1     Nurse

## 2025-07-09 ENCOUNTER — TELEPHONE (OUTPATIENT)
Dept: FAMILY MEDICINE | Facility: CLINIC | Age: 75
End: 2025-07-09
Payer: MEDICARE

## 2025-07-09 ENCOUNTER — HOSPITAL ENCOUNTER (INPATIENT)
Facility: HOSPITAL | Age: 75
LOS: 4 days | Discharge: HOME | DRG: 193 | End: 2025-07-14
Attending: STUDENT IN AN ORGANIZED HEALTH CARE EDUCATION/TRAINING PROGRAM | Admitting: INTERNAL MEDICINE
Payer: MEDICARE

## 2025-07-09 DIAGNOSIS — R06.02 SOB (SHORTNESS OF BREATH): ICD-10-CM

## 2025-07-09 DIAGNOSIS — I48.91 ATRIAL FIBRILLATION WITH RVR: ICD-10-CM

## 2025-07-09 DIAGNOSIS — R07.9 CHEST PAIN: ICD-10-CM

## 2025-07-09 DIAGNOSIS — J18.9 PNEUMONIA: Primary | ICD-10-CM

## 2025-07-09 DIAGNOSIS — I48.91 ATRIAL FIBRILLATION: ICD-10-CM

## 2025-07-09 DIAGNOSIS — J96.01 ACUTE RESPIRATORY FAILURE WITH HYPOXIA: ICD-10-CM

## 2025-07-09 LAB
ALBUMIN SERPL-MCNC: 3 G/DL (ref 3.4–4.8)
ALBUMIN/GLOB SERPL: 0.7 RATIO (ref 1.1–2)
ALP SERPL-CCNC: 79 UNIT/L (ref 40–150)
ALT SERPL-CCNC: 71 UNIT/L (ref 0–55)
ANION GAP SERPL CALC-SCNC: 13 MEQ/L
AST SERPL-CCNC: 70 UNIT/L (ref 11–45)
BASOPHILS # BLD AUTO: 0.02 X10(3)/MCL
BASOPHILS NFR BLD AUTO: 0.2 %
BILIRUB SERPL-MCNC: 1 MG/DL
BNP BLD-MCNC: 179.4 PG/ML
BUN SERPL-MCNC: 12.8 MG/DL (ref 9.8–20.1)
CALCIUM SERPL-MCNC: 9.5 MG/DL (ref 8.4–10.2)
CHLORIDE SERPL-SCNC: 101 MMOL/L (ref 98–107)
CO2 SERPL-SCNC: 20 MMOL/L (ref 23–31)
CREAT SERPL-MCNC: 0.73 MG/DL (ref 0.55–1.02)
CREAT/UREA NIT SERPL: 18
EOSINOPHIL # BLD AUTO: 0 X10(3)/MCL (ref 0–0.9)
EOSINOPHIL NFR BLD AUTO: 0 %
ERYTHROCYTE [DISTWIDTH] IN BLOOD BY AUTOMATED COUNT: 13.4 % (ref 11.5–17)
FLUAV AG UPPER RESP QL IA.RAPID: NOT DETECTED
FLUBV AG UPPER RESP QL IA.RAPID: NOT DETECTED
GFR SERPLBLD CREATININE-BSD FMLA CKD-EPI: >60 ML/MIN/1.73/M2
GLOBULIN SER-MCNC: 4.6 GM/DL (ref 2.4–3.5)
GLUCOSE SERPL-MCNC: 136 MG/DL (ref 82–115)
HCT VFR BLD AUTO: 32.4 % (ref 37–47)
HGB BLD-MCNC: 11 G/DL (ref 12–16)
IMM GRANULOCYTES # BLD AUTO: 0.06 X10(3)/MCL (ref 0–0.04)
IMM GRANULOCYTES NFR BLD AUTO: 0.5 %
LACTATE SERPL-SCNC: 1.3 MMOL/L (ref 0.5–2.2)
LYMPHOCYTES # BLD AUTO: 0.57 X10(3)/MCL (ref 0.6–4.6)
LYMPHOCYTES NFR BLD AUTO: 5.2 %
MCH RBC QN AUTO: 30.2 PG (ref 27–31)
MCHC RBC AUTO-ENTMCNC: 34 G/DL (ref 33–36)
MCV RBC AUTO: 89 FL (ref 80–94)
MONOCYTES # BLD AUTO: 0.49 X10(3)/MCL (ref 0.1–1.3)
MONOCYTES NFR BLD AUTO: 4.5 %
NEUTROPHILS # BLD AUTO: 9.77 X10(3)/MCL (ref 2.1–9.2)
NEUTROPHILS NFR BLD AUTO: 89.6 %
NRBC BLD AUTO-RTO: 0 %
PLATELET # BLD AUTO: 193 X10(3)/MCL (ref 130–400)
PMV BLD AUTO: 9.8 FL (ref 7.4–10.4)
POTASSIUM SERPL-SCNC: 3.5 MMOL/L (ref 3.5–5.1)
PROT SERPL-MCNC: 7.6 GM/DL (ref 5.8–7.6)
RBC # BLD AUTO: 3.64 X10(6)/MCL (ref 4.2–5.4)
SARS-COV-2 RNA RESP QL NAA+PROBE: NOT DETECTED
SODIUM SERPL-SCNC: 134 MMOL/L (ref 136–145)
TROPONIN I SERPL-MCNC: <0.01 NG/ML (ref 0–0.04)
WBC # BLD AUTO: 10.91 X10(3)/MCL (ref 4.5–11.5)

## 2025-07-09 PROCEDURE — 93010 ELECTROCARDIOGRAM REPORT: CPT | Mod: ,,, | Performed by: INTERNAL MEDICINE

## 2025-07-09 PROCEDURE — 25000003 PHARM REV CODE 250: Performed by: STUDENT IN AN ORGANIZED HEALTH CARE EDUCATION/TRAINING PROGRAM

## 2025-07-09 PROCEDURE — 83880 ASSAY OF NATRIURETIC PEPTIDE: CPT | Performed by: STUDENT IN AN ORGANIZED HEALTH CARE EDUCATION/TRAINING PROGRAM

## 2025-07-09 PROCEDURE — 93005 ELECTROCARDIOGRAM TRACING: CPT

## 2025-07-09 PROCEDURE — 87040 BLOOD CULTURE FOR BACTERIA: CPT | Performed by: STUDENT IN AN ORGANIZED HEALTH CARE EDUCATION/TRAINING PROGRAM

## 2025-07-09 PROCEDURE — 87636 SARSCOV2 & INF A&B AMP PRB: CPT | Performed by: STUDENT IN AN ORGANIZED HEALTH CARE EDUCATION/TRAINING PROGRAM

## 2025-07-09 PROCEDURE — 85025 COMPLETE CBC W/AUTO DIFF WBC: CPT | Performed by: STUDENT IN AN ORGANIZED HEALTH CARE EDUCATION/TRAINING PROGRAM

## 2025-07-09 PROCEDURE — 63600175 PHARM REV CODE 636 W HCPCS: Performed by: STUDENT IN AN ORGANIZED HEALTH CARE EDUCATION/TRAINING PROGRAM

## 2025-07-09 PROCEDURE — 84484 ASSAY OF TROPONIN QUANT: CPT | Performed by: STUDENT IN AN ORGANIZED HEALTH CARE EDUCATION/TRAINING PROGRAM

## 2025-07-09 PROCEDURE — 83605 ASSAY OF LACTIC ACID: CPT | Performed by: STUDENT IN AN ORGANIZED HEALTH CARE EDUCATION/TRAINING PROGRAM

## 2025-07-09 PROCEDURE — 96375 TX/PRO/DX INJ NEW DRUG ADDON: CPT

## 2025-07-09 PROCEDURE — 96365 THER/PROPH/DIAG IV INF INIT: CPT

## 2025-07-09 PROCEDURE — 84443 ASSAY THYROID STIM HORMONE: CPT | Performed by: NURSE PRACTITIONER

## 2025-07-09 PROCEDURE — 99291 CRITICAL CARE FIRST HOUR: CPT

## 2025-07-09 PROCEDURE — 80053 COMPREHEN METABOLIC PANEL: CPT | Performed by: STUDENT IN AN ORGANIZED HEALTH CARE EDUCATION/TRAINING PROGRAM

## 2025-07-09 RX ORDER — SODIUM CHLORIDE 9 MG/ML
1000 INJECTION, SOLUTION INTRAVENOUS
Status: COMPLETED | OUTPATIENT
Start: 2025-07-09 | End: 2025-07-09

## 2025-07-09 RX ORDER — DILTIAZEM HYDROCHLORIDE 5 MG/ML
20 INJECTION INTRAVENOUS
Status: COMPLETED | OUTPATIENT
Start: 2025-07-09 | End: 2025-07-09

## 2025-07-09 RX ORDER — CEFTRIAXONE 1 G/1
1 INJECTION, POWDER, FOR SOLUTION INTRAMUSCULAR; INTRAVENOUS
Status: COMPLETED | OUTPATIENT
Start: 2025-07-09 | End: 2025-07-09

## 2025-07-09 RX ORDER — ACETAMINOPHEN 500 MG
TABLET ORAL NIGHTLY
COMMUNITY

## 2025-07-09 RX ADMIN — DILTIAZEM HYDROCHLORIDE 20 MG: 5 INJECTION INTRAVENOUS at 10:07

## 2025-07-09 RX ADMIN — SODIUM CHLORIDE 1000 ML: 9 INJECTION, SOLUTION INTRAVENOUS at 10:07

## 2025-07-09 RX ADMIN — AZITHROMYCIN 500 MG: 500 INJECTION, POWDER, LYOPHILIZED, FOR SOLUTION INTRAVENOUS at 11:07

## 2025-07-09 RX ADMIN — CEFTRIAXONE SODIUM 1 G: 1 INJECTION, POWDER, FOR SOLUTION INTRAMUSCULAR; INTRAVENOUS at 11:07

## 2025-07-09 NOTE — TELEPHONE ENCOUNTER
Copied from CRM #[id number]. Topic: General Inquiry - Patient Advice  >> Jul 9, 2025  9:17 AM Amena wrote:  Who Called: Nel Recinos    Caller is requesting assistance/information from provider's office.    Symptoms (please be specific):    How long has patient had these symptoms:    List of preferred pharmacies on file (remove unneeded): [unfilled]  If different, enter pharmacy into here including location and phone number:       Preferred Method of Contact: Phone Call  Patient's Preferred Phone Number on File: [phone]   Best Call Back Number, if different:  Additional Information: Pt called to speak with the nurse about her recent office visit. Pt states she feel she has gotten worst

## 2025-07-09 NOTE — TELEPHONE ENCOUNTER
Pt was instructed to phone clinic on Wednesday if no improvement of symptoms. Reported symptoms have gotten worse.  Pt seen in clinic on 7/7/25.  Please advise...

## 2025-07-10 LAB
ALBUMIN SERPL-MCNC: 2.5 G/DL (ref 3.4–4.8)
ALBUMIN/GLOB SERPL: 0.6 RATIO (ref 1.1–2)
ALP SERPL-CCNC: 66 UNIT/L (ref 40–150)
ALT SERPL-CCNC: 58 UNIT/L (ref 0–55)
ANION GAP SERPL CALC-SCNC: 12 MEQ/L
APICAL FOUR CHAMBER EJECTION FRACTION: 48 %
AST SERPL-CCNC: 48 UNIT/L (ref 11–45)
AV INDEX (PROSTH): 0.87
AV MEAN GRADIENT: 4 MMHG
AV PEAK GRADIENT: 8 MMHG
AV VALVE AREA BY VELOCITY RATIO: 2.2 CM²
AV VALVE AREA: 2.2 CM²
AV VELOCITY RATIO: 0.86
B PERT.PT PRMT NPH QL NAA+NON-PROBE: NOT DETECTED
BACTERIA #/AREA URNS AUTO: ABNORMAL /HPF
BASOPHILS # BLD AUTO: 0.05 X10(3)/MCL
BASOPHILS NFR BLD AUTO: 0.5 %
BILIRUB SERPL-MCNC: 0.7 MG/DL
BILIRUB UR QL STRIP.AUTO: NEGATIVE
BSA FOR ECHO PROCEDURE: 1.71 M2
BUN SERPL-MCNC: 9.3 MG/DL (ref 9.8–20.1)
C PNEUM DNA NPH QL NAA+NON-PROBE: NOT DETECTED
CALCIUM SERPL-MCNC: 8.6 MG/DL (ref 8.4–10.2)
CHLORIDE SERPL-SCNC: 105 MMOL/L (ref 98–107)
CLARITY UR: ABNORMAL
CO2 SERPL-SCNC: 21 MMOL/L (ref 23–31)
COLOR UR AUTO: YELLOW
CREAT SERPL-MCNC: 0.72 MG/DL (ref 0.55–1.02)
CREAT/UREA NIT SERPL: 13
CV ECHO LV RWT: 0.47 CM
DOP CALC AO PEAK VEL: 1.4 M/S
DOP CALC AO VTI: 22.6 CM
DOP CALC LVOT AREA: 2.5 CM2
DOP CALC LVOT DIAMETER: 1.8 CM
DOP CALC LVOT PEAK VEL: 1.2 M/S
DOP CALC LVOT STROKE VOLUME: 50.1 CM3
DOP CALC MV VTI: 26.6 CM
DOP CALCLVOT PEAK VEL VTI: 19.7 CM
E WAVE DECELERATION TIME: 145 MSEC
E/A RATIO: 1.78
E/E' RATIO: 11 M/S
ECHO LV POSTERIOR WALL: 0.9 CM (ref 0.6–1.1)
EJECTION FRACTION: 55 %
EOSINOPHIL # BLD AUTO: 0.06 X10(3)/MCL (ref 0–0.9)
EOSINOPHIL NFR BLD AUTO: 0.6 %
EPI CELLS #/AREA URNS HPF: ABNORMAL /HPF
ERYTHROCYTE [DISTWIDTH] IN BLOOD BY AUTOMATED COUNT: 13.7 % (ref 11.5–17)
FRACTIONAL SHORTENING: 52.6 % (ref 28–44)
GFR SERPLBLD CREATININE-BSD FMLA CKD-EPI: >60 ML/MIN/1.73/M2
GLOBULIN SER-MCNC: 4.1 GM/DL (ref 2.4–3.5)
GLUCOSE SERPL-MCNC: 114 MG/DL (ref 82–115)
GLUCOSE UR QL STRIP: NORMAL
HADV DNA NPH QL NAA+NON-PROBE: NOT DETECTED
HCOV 229E RNA NPH QL NAA+NON-PROBE: NOT DETECTED
HCOV HKU1 RNA NPH QL NAA+NON-PROBE: NOT DETECTED
HCOV NL63 RNA NPH QL NAA+NON-PROBE: NOT DETECTED
HCOV OC43 RNA NPH QL NAA+NON-PROBE: NOT DETECTED
HCT VFR BLD AUTO: 31.7 % (ref 37–47)
HGB BLD-MCNC: 10.5 G/DL (ref 12–16)
HGB UR QL STRIP: ABNORMAL
HMPV RNA NPH QL NAA+NON-PROBE: NOT DETECTED
HPIV1 RNA NPH QL NAA+NON-PROBE: NOT DETECTED
HPIV2 RNA NPH QL NAA+NON-PROBE: NOT DETECTED
HPIV3 RNA NPH QL NAA+NON-PROBE: NOT DETECTED
HPIV4 RNA NPH QL NAA+NON-PROBE: NOT DETECTED
IMM GRANULOCYTES # BLD AUTO: 0.06 X10(3)/MCL (ref 0–0.04)
IMM GRANULOCYTES NFR BLD AUTO: 0.6 %
INTERVENTRICULAR SEPTUM: 1.1 CM (ref 0.6–1.1)
KETONES UR QL STRIP: NEGATIVE
LEFT ATRIUM AREA SYSTOLIC (APICAL 4 CHAMBER): 21.6 CM2
LEFT ATRIUM SIZE: 4 CM
LEFT ATRIUM VOLUME INDEX MOD: 36 ML/M2
LEFT ATRIUM VOLUME MOD: 62 ML
LEFT INTERNAL DIMENSION IN SYSTOLE: 1.8 CM (ref 2.1–4)
LEFT VENTRICLE DIASTOLIC VOLUME INDEX: 35.29 ML/M2
LEFT VENTRICLE DIASTOLIC VOLUME: 60 ML
LEFT VENTRICLE END DIASTOLIC VOLUME APICAL 4 CHAMBER: 54 ML
LEFT VENTRICLE END SYSTOLIC VOLUME APICAL 4 CHAMBER: 62.1 ML
LEFT VENTRICLE MASS INDEX: 69 G/M2
LEFT VENTRICLE SYSTOLIC VOLUME INDEX: 5.9 ML/M2
LEFT VENTRICLE SYSTOLIC VOLUME: 10 ML
LEFT VENTRICULAR INTERNAL DIMENSION IN DIASTOLE: 3.8 CM (ref 3.5–6)
LEFT VENTRICULAR MASS: 117.3 G
LEUKOCYTE ESTERASE UR QL STRIP: 250
LV LATERAL E/E' RATIO: 10.7 M/S
LV SEPTAL E/E' RATIO: 10.7 M/S
LVED V (TEICH): 60.4 ML
LVES V (TEICH): 9.86 ML
LVOT MG: 3 MMHG
LVOT MV: 0.79 CM/S
LYMPHOCYTES # BLD AUTO: 0.59 X10(3)/MCL (ref 0.6–4.6)
LYMPHOCYTES NFR BLD AUTO: 6.1 %
M PNEUMO DNA NPH QL NAA+NON-PROBE: NOT DETECTED
MAGNESIUM SERPL-MCNC: 1.8 MG/DL (ref 1.6–2.6)
MCH RBC QN AUTO: 29.6 PG (ref 27–31)
MCHC RBC AUTO-ENTMCNC: 33.1 G/DL (ref 33–36)
MCV RBC AUTO: 89.3 FL (ref 80–94)
MONOCYTES # BLD AUTO: 0.5 X10(3)/MCL (ref 0.1–1.3)
MONOCYTES NFR BLD AUTO: 5.1 %
MUCOUS THREADS URNS QL MICRO: ABNORMAL /LPF
MV MEAN GRADIENT: 3 MMHG
MV PEAK A VEL: 0.6 M/S
MV PEAK E VEL: 1.07 M/S
MV PEAK GRADIENT: 7 MMHG
MV STENOSIS PRESSURE HALF TIME: 73 MS
MV VALVE AREA BY CONTINUITY EQUATION: 1.88 CM2
MV VALVE AREA P 1/2 METHOD: 3.01 CM2
NEUTROPHILS # BLD AUTO: 8.45 X10(3)/MCL (ref 2.1–9.2)
NEUTROPHILS NFR BLD AUTO: 87.1 %
NITRITE UR QL STRIP: NEGATIVE
NRBC BLD AUTO-RTO: 0 %
OHS QRS DURATION: 74 MS
OHS QTC CALCULATION: 467 MS
PH UR STRIP: 6.5 [PH]
PISA TR MAX VEL: 2.5 M/S
PLATELET # BLD AUTO: 181 X10(3)/MCL (ref 130–400)
PMV BLD AUTO: 9.9 FL (ref 7.4–10.4)
POTASSIUM SERPL-SCNC: 3.1 MMOL/L (ref 3.5–5.1)
PROT SERPL-MCNC: 6.6 GM/DL (ref 5.8–7.6)
PROT UR QL STRIP: ABNORMAL
PV PEAK GRADIENT: 4 MMHG
PV PEAK VELOCITY: 0.94 M/S
RBC # BLD AUTO: 3.55 X10(6)/MCL (ref 4.2–5.4)
RBC #/AREA URNS AUTO: ABNORMAL /HPF
RSV RNA NPH QL NAA+NON-PROBE: NOT DETECTED
RV+EV RNA NPH QL NAA+NON-PROBE: NOT DETECTED
SODIUM SERPL-SCNC: 138 MMOL/L (ref 136–145)
SP GR UR STRIP.AUTO: 1.02 (ref 1–1.03)
SQUAMOUS #/AREA URNS LPF: ABNORMAL /HPF
TDI LATERAL: 0.1 M/S
TDI SEPTAL: 0.1 M/S
TDI: 0.1 M/S
TR MAX PG: 26 MMHG
TRICUSPID ANNULAR PLANE SYSTOLIC EXCURSION: 2 CM
TSH SERPL-ACNC: 1.92 UIU/ML (ref 0.35–4.94)
UROBILINOGEN UR STRIP-ACNC: NORMAL
WBC # BLD AUTO: 9.71 X10(3)/MCL (ref 4.5–11.5)
WBC #/AREA URNS AUTO: ABNORMAL /HPF
Z-SCORE OF LEFT VENTRICULAR DIMENSION IN END DIASTOLE: -2.18
Z-SCORE OF LEFT VENTRICULAR DIMENSION IN END SYSTOLE: -3.85

## 2025-07-10 PROCEDURE — 81015 MICROSCOPIC EXAM OF URINE: CPT | Performed by: INTERNAL MEDICINE

## 2025-07-10 PROCEDURE — 99900035 HC TECH TIME PER 15 MIN (STAT)

## 2025-07-10 PROCEDURE — 94640 AIRWAY INHALATION TREATMENT: CPT

## 2025-07-10 PROCEDURE — 25000003 PHARM REV CODE 250: Performed by: INTERNAL MEDICINE

## 2025-07-10 PROCEDURE — 25000242 PHARM REV CODE 250 ALT 637 W/ HCPCS: Performed by: INTERNAL MEDICINE

## 2025-07-10 PROCEDURE — 25000242 PHARM REV CODE 250 ALT 637 W/ HCPCS: Performed by: NURSE PRACTITIONER

## 2025-07-10 PROCEDURE — 94799 UNLISTED PULMONARY SVC/PX: CPT

## 2025-07-10 PROCEDURE — 25000003 PHARM REV CODE 250: Performed by: NURSE PRACTITIONER

## 2025-07-10 PROCEDURE — 96375 TX/PRO/DX INJ NEW DRUG ADDON: CPT

## 2025-07-10 PROCEDURE — 83735 ASSAY OF MAGNESIUM: CPT | Performed by: NURSE PRACTITIONER

## 2025-07-10 PROCEDURE — 87632 RESP VIRUS 6-11 TARGETS: CPT | Performed by: INTERNAL MEDICINE

## 2025-07-10 PROCEDURE — 63600175 PHARM REV CODE 636 W HCPCS: Performed by: INTERNAL MEDICINE

## 2025-07-10 PROCEDURE — 96365 THER/PROPH/DIAG IV INF INIT: CPT | Mod: 59

## 2025-07-10 PROCEDURE — 94761 N-INVAS EAR/PLS OXIMETRY MLT: CPT

## 2025-07-10 PROCEDURE — 25000003 PHARM REV CODE 250: Performed by: STUDENT IN AN ORGANIZED HEALTH CARE EDUCATION/TRAINING PROGRAM

## 2025-07-10 PROCEDURE — 96376 TX/PRO/DX INJ SAME DRUG ADON: CPT

## 2025-07-10 PROCEDURE — 21400001 HC TELEMETRY ROOM

## 2025-07-10 PROCEDURE — 80053 COMPREHEN METABOLIC PANEL: CPT | Performed by: NURSE PRACTITIONER

## 2025-07-10 PROCEDURE — 27000221 HC OXYGEN, UP TO 24 HOURS

## 2025-07-10 PROCEDURE — 63600175 PHARM REV CODE 636 W HCPCS: Performed by: STUDENT IN AN ORGANIZED HEALTH CARE EDUCATION/TRAINING PROGRAM

## 2025-07-10 PROCEDURE — 96366 THER/PROPH/DIAG IV INF ADDON: CPT

## 2025-07-10 PROCEDURE — 85025 COMPLETE CBC W/AUTO DIFF WBC: CPT | Performed by: NURSE PRACTITIONER

## 2025-07-10 RX ORDER — ACETAMINOPHEN 10 MG/ML
1000 INJECTION, SOLUTION INTRAVENOUS ONCE
Status: COMPLETED | OUTPATIENT
Start: 2025-07-10 | End: 2025-07-10

## 2025-07-10 RX ORDER — LEVALBUTEROL INHALATION SOLUTION 1.25 MG/3ML
1.25 SOLUTION RESPIRATORY (INHALATION) EVERY 8 HOURS
Status: COMPLETED | OUTPATIENT
Start: 2025-07-10 | End: 2025-07-12

## 2025-07-10 RX ORDER — IBUPROFEN 200 MG
16 TABLET ORAL
Status: DISCONTINUED | OUTPATIENT
Start: 2025-07-10 | End: 2025-07-14 | Stop reason: HOSPADM

## 2025-07-10 RX ORDER — ENOXAPARIN SODIUM 100 MG/ML
1 INJECTION SUBCUTANEOUS
Status: COMPLETED | OUTPATIENT
Start: 2025-07-10 | End: 2025-07-10

## 2025-07-10 RX ORDER — IPRATROPIUM BROMIDE AND ALBUTEROL SULFATE 2.5; .5 MG/3ML; MG/3ML
3 SOLUTION RESPIRATORY (INHALATION) EVERY 4 HOURS PRN
Status: DISCONTINUED | OUTPATIENT
Start: 2025-07-10 | End: 2025-07-10

## 2025-07-10 RX ORDER — AMOXICILLIN 250 MG
1 CAPSULE ORAL 2 TIMES DAILY PRN
Status: DISCONTINUED | OUTPATIENT
Start: 2025-07-10 | End: 2025-07-14 | Stop reason: HOSPADM

## 2025-07-10 RX ORDER — GUAIFENESIN AND DEXTROMETHORPHAN HYDROBROMIDE 10; 100 MG/5ML; MG/5ML
10 SYRUP ORAL EVERY 6 HOURS
Status: COMPLETED | OUTPATIENT
Start: 2025-07-10 | End: 2025-07-13

## 2025-07-10 RX ORDER — ONDANSETRON HYDROCHLORIDE 2 MG/ML
4 INJECTION, SOLUTION INTRAVENOUS EVERY 4 HOURS PRN
Status: DISCONTINUED | OUTPATIENT
Start: 2025-07-10 | End: 2025-07-14 | Stop reason: HOSPADM

## 2025-07-10 RX ORDER — POTASSIUM CHLORIDE 20 MEQ/1
40 TABLET, EXTENDED RELEASE ORAL EVERY 4 HOURS
Status: COMPLETED | OUTPATIENT
Start: 2025-07-10 | End: 2025-07-10

## 2025-07-10 RX ORDER — NALOXONE HCL 0.4 MG/ML
0.02 VIAL (ML) INJECTION
Status: DISCONTINUED | OUTPATIENT
Start: 2025-07-10 | End: 2025-07-14 | Stop reason: HOSPADM

## 2025-07-10 RX ORDER — ACETAMINOPHEN 500 MG
1000 TABLET ORAL EVERY 6 HOURS PRN
Status: DISCONTINUED | OUTPATIENT
Start: 2025-07-10 | End: 2025-07-14 | Stop reason: HOSPADM

## 2025-07-10 RX ORDER — LOPERAMIDE HYDROCHLORIDE 2 MG/1
4 CAPSULE ORAL 4 TIMES DAILY PRN
Status: DISCONTINUED | OUTPATIENT
Start: 2025-07-10 | End: 2025-07-14 | Stop reason: HOSPADM

## 2025-07-10 RX ORDER — LANOLIN ALCOHOL/MO/W.PET/CERES
800 CREAM (GRAM) TOPICAL ONCE
Status: COMPLETED | OUTPATIENT
Start: 2025-07-10 | End: 2025-07-10

## 2025-07-10 RX ORDER — EZETIMIBE 10 MG/1
10 TABLET ORAL DAILY
Status: DISCONTINUED | OUTPATIENT
Start: 2025-07-10 | End: 2025-07-14 | Stop reason: HOSPADM

## 2025-07-10 RX ORDER — IBUPROFEN 200 MG
24 TABLET ORAL
Status: DISCONTINUED | OUTPATIENT
Start: 2025-07-10 | End: 2025-07-14 | Stop reason: HOSPADM

## 2025-07-10 RX ORDER — SODIUM CHLORIDE 0.9 % (FLUSH) 0.9 %
10 SYRINGE (ML) INJECTION
Status: DISCONTINUED | OUTPATIENT
Start: 2025-07-10 | End: 2025-07-14 | Stop reason: HOSPADM

## 2025-07-10 RX ORDER — SOTALOL HYDROCHLORIDE 80 MG/1
80 TABLET ORAL 2 TIMES DAILY
Status: DISCONTINUED | OUTPATIENT
Start: 2025-07-10 | End: 2025-07-14 | Stop reason: HOSPADM

## 2025-07-10 RX ORDER — ALUMINUM HYDROXIDE, MAGNESIUM HYDROXIDE, AND SIMETHICONE 1200; 120; 1200 MG/30ML; MG/30ML; MG/30ML
30 SUSPENSION ORAL 4 TIMES DAILY PRN
Status: DISCONTINUED | OUTPATIENT
Start: 2025-07-10 | End: 2025-07-14 | Stop reason: HOSPADM

## 2025-07-10 RX ORDER — DILTIAZEM HYDROCHLORIDE 60 MG/1
60 TABLET, FILM COATED ORAL EVERY 6 HOURS
Status: DISCONTINUED | OUTPATIENT
Start: 2025-07-10 | End: 2025-07-11

## 2025-07-10 RX ORDER — BISACODYL 10 MG/1
10 SUPPOSITORY RECTAL DAILY PRN
Status: DISCONTINUED | OUTPATIENT
Start: 2025-07-10 | End: 2025-07-14 | Stop reason: HOSPADM

## 2025-07-10 RX ORDER — LEVALBUTEROL INHALATION SOLUTION 1.25 MG/3ML
1.25 SOLUTION RESPIRATORY (INHALATION) EVERY 4 HOURS PRN
Status: DISCONTINUED | OUTPATIENT
Start: 2025-07-10 | End: 2025-07-14 | Stop reason: HOSPADM

## 2025-07-10 RX ORDER — CEFTRIAXONE 1 G/1
1 INJECTION, POWDER, FOR SOLUTION INTRAMUSCULAR; INTRAVENOUS
Status: DISCONTINUED | OUTPATIENT
Start: 2025-07-10 | End: 2025-07-10

## 2025-07-10 RX ORDER — BENZONATATE 100 MG/1
100 CAPSULE ORAL 3 TIMES DAILY PRN
Status: DISCONTINUED | OUTPATIENT
Start: 2025-07-10 | End: 2025-07-14 | Stop reason: HOSPADM

## 2025-07-10 RX ORDER — PROCHLORPERAZINE EDISYLATE 5 MG/ML
5 INJECTION INTRAMUSCULAR; INTRAVENOUS EVERY 6 HOURS PRN
Status: DISCONTINUED | OUTPATIENT
Start: 2025-07-10 | End: 2025-07-14 | Stop reason: HOSPADM

## 2025-07-10 RX ORDER — DOXYCYCLINE HYCLATE 100 MG
100 TABLET ORAL EVERY 12 HOURS
Status: DISCONTINUED | OUTPATIENT
Start: 2025-07-10 | End: 2025-07-14 | Stop reason: HOSPADM

## 2025-07-10 RX ORDER — SERTRALINE HYDROCHLORIDE 50 MG/1
50 TABLET, FILM COATED ORAL NIGHTLY
Status: DISCONTINUED | OUTPATIENT
Start: 2025-07-10 | End: 2025-07-14 | Stop reason: HOSPADM

## 2025-07-10 RX ORDER — GLUCAGON 1 MG
1 KIT INJECTION
Status: DISCONTINUED | OUTPATIENT
Start: 2025-07-10 | End: 2025-07-14 | Stop reason: HOSPADM

## 2025-07-10 RX ORDER — TALC
6 POWDER (GRAM) TOPICAL NIGHTLY PRN
Status: DISCONTINUED | OUTPATIENT
Start: 2025-07-10 | End: 2025-07-14 | Stop reason: HOSPADM

## 2025-07-10 RX ORDER — DILTIAZEM HYDROCHLORIDE 5 MG/ML
10 INJECTION INTRAVENOUS
Status: COMPLETED | OUTPATIENT
Start: 2025-07-10 | End: 2025-07-10

## 2025-07-10 RX ORDER — LEVALBUTEROL INHALATION SOLUTION 1.25 MG/3ML
1.25 SOLUTION RESPIRATORY (INHALATION) EVERY 4 HOURS PRN
Status: DISCONTINUED | OUTPATIENT
Start: 2025-07-10 | End: 2025-07-10

## 2025-07-10 RX ORDER — PANTOPRAZOLE SODIUM 40 MG/1
40 TABLET, DELAYED RELEASE ORAL EVERY MORNING
Status: DISCONTINUED | OUTPATIENT
Start: 2025-07-10 | End: 2025-07-14 | Stop reason: HOSPADM

## 2025-07-10 RX ORDER — LEVALBUTEROL INHALATION SOLUTION 0.63 MG/3ML
0.63 SOLUTION RESPIRATORY (INHALATION) EVERY 4 HOURS PRN
Status: DISCONTINUED | OUTPATIENT
Start: 2025-07-10 | End: 2025-07-10

## 2025-07-10 RX ORDER — MONTELUKAST SODIUM 10 MG/1
10 TABLET ORAL NIGHTLY
Status: DISCONTINUED | OUTPATIENT
Start: 2025-07-10 | End: 2025-07-14 | Stop reason: HOSPADM

## 2025-07-10 RX ORDER — CETIRIZINE HYDROCHLORIDE 10 MG/1
10 TABLET ORAL NIGHTLY
Status: DISCONTINUED | OUTPATIENT
Start: 2025-07-10 | End: 2025-07-14 | Stop reason: HOSPADM

## 2025-07-10 RX ADMIN — Medication 6 MG: at 08:07

## 2025-07-10 RX ADMIN — LEVALBUTEROL HYDROCHLORIDE 1.25 MG: 1.25 SOLUTION RESPIRATORY (INHALATION) at 06:07

## 2025-07-10 RX ADMIN — DOXYCYCLINE HYCLATE 100 MG: 100 TABLET, COATED ORAL at 11:07

## 2025-07-10 RX ADMIN — ACETAMINOPHEN 1000 MG: 10 INJECTION, SOLUTION INTRAVENOUS at 01:07

## 2025-07-10 RX ADMIN — EZETIMIBE 10 MG: 10 TABLET ORAL at 11:07

## 2025-07-10 RX ADMIN — GUAIFENESIN AND DEXTROMETHORPHAN 10 ML: 100; 10 SYRUP ORAL at 05:07

## 2025-07-10 RX ADMIN — ENOXAPARIN SODIUM 60 MG: 60 INJECTION SUBCUTANEOUS at 05:07

## 2025-07-10 RX ADMIN — SERTRALINE HYDROCHLORIDE 50 MG: 50 TABLET ORAL at 08:07

## 2025-07-10 RX ADMIN — DILTIAZEM HYDROCHLORIDE 10 MG: 5 INJECTION INTRAVENOUS at 02:07

## 2025-07-10 RX ADMIN — CETIRIZINE HYDROCHLORIDE 10 MG: 10 TABLET, FILM COATED ORAL at 08:07

## 2025-07-10 RX ADMIN — DOXYCYCLINE HYCLATE 100 MG: 100 TABLET, COATED ORAL at 08:07

## 2025-07-10 RX ADMIN — MONTELUKAST 10 MG: 10 TABLET, FILM COATED ORAL at 08:07

## 2025-07-10 RX ADMIN — Medication 1 EACH: at 04:07

## 2025-07-10 RX ADMIN — SOTALOL HYDROCHLORIDE 80 MG: 80 TABLET ORAL at 11:07

## 2025-07-10 RX ADMIN — POTASSIUM CHLORIDE 40 MEQ: 1500 TABLET, EXTENDED RELEASE ORAL at 02:07

## 2025-07-10 RX ADMIN — APIXABAN 5 MG: 5 TABLET, FILM COATED ORAL at 11:07

## 2025-07-10 RX ADMIN — ACETAMINOPHEN 1000 MG: 500 TABLET ORAL at 09:07

## 2025-07-10 RX ADMIN — BENZONATATE 100 MG: 100 CAPSULE ORAL at 08:07

## 2025-07-10 RX ADMIN — PANTOPRAZOLE SODIUM 40 MG: 40 TABLET, DELAYED RELEASE ORAL at 11:07

## 2025-07-10 RX ADMIN — GUAIFENESIN AND DEXTROMETHORPHAN 10 ML: 100; 10 SYRUP ORAL at 11:07

## 2025-07-10 RX ADMIN — APIXABAN 5 MG: 5 TABLET, FILM COATED ORAL at 08:07

## 2025-07-10 RX ADMIN — SOTALOL HYDROCHLORIDE 80 MG: 80 TABLET ORAL at 08:07

## 2025-07-10 RX ADMIN — POTASSIUM CHLORIDE 40 MEQ: 1500 TABLET, EXTENDED RELEASE ORAL at 11:07

## 2025-07-10 RX ADMIN — LEVALBUTEROL HYDROCHLORIDE 0.63 MG: 0.63 SOLUTION RESPIRATORY (INHALATION) at 03:07

## 2025-07-10 RX ADMIN — DILTIAZEM HYDROCHLORIDE 5 MG/HR: 5 INJECTION INTRAVENOUS at 12:07

## 2025-07-10 RX ADMIN — Medication 800 MG: at 11:07

## 2025-07-10 RX ADMIN — DILTIAZEM HYDROCHLORIDE 60 MG: 60 TABLET, FILM COATED ORAL at 02:07

## 2025-07-10 RX ADMIN — PIPERACILLIN SODIUM AND TAZOBACTAM SODIUM 4.5 G: 4; .5 INJECTION, POWDER, LYOPHILIZED, FOR SOLUTION INTRAVENOUS at 04:07

## 2025-07-10 RX ADMIN — ACETAMINOPHEN 1000 MG: 500 TABLET ORAL at 08:07

## 2025-07-10 NOTE — PLAN OF CARE
07/10/25 1551   Discharge Assessment   Assessment Type Discharge Planning Assessment   Confirmed/corrected address, phone number and insurance Yes   Confirmed Demographics Correct on Facesheet   Source of Information patient   People in Home alone   Do you expect to return to your current living situation? Yes   Do you have help at home or someone to help you manage your care at home? Yes   Who are your caregiver(s) and their phone number(s)? Slick/ELSA/[phone]   Current cognitive status: Alert/Oriented   Walking or Climbing Stairs Difficulty no   Dressing/Bathing Difficulty no   Home Accessibility wheelchair accessible   Home Layout Able to live on 1st floor   Equipment Currently Used at Home none   Readmission within 30 days? No   Do you currently have service(s) that help you manage your care at home? No   Do you take prescription medications? Yes   Do you have prescription coverage? Yes   Do you have any problems affording any of your prescribed medications? No   Who is going to help you get home at discharge? Slick   How do you get to doctors appointments? car, drives self   Are you on dialysis? No   Do you take coumadin? No   Discharge Plan A Home   Discharge Plan B Home   Discharge Plan discussed with: Patient   Housing Stability   In the last 12 months, was there a time when you were not able to pay the mortgage or rent on time? N   At any time in the past 12 months, were you homeless or living in a shelter (including now)? N   Transportation Needs   In the past 12 months, has lack of transportation kept you from medical appointments or from getting medications? no   In the past 12 months, has lack of transportation kept you from meetings, work, or from getting things needed for daily living? No   Food Insecurity   Within the past 12 months, you worried that your food would run out before you got the money to buy more. Never true   Within the past 12 months, the food you bought just didn't last and you  didn't have money to get more. Never true   Utilities   In the past 12 months has the electric, gas, oil, or water company threatened to shut off services in your home? No

## 2025-07-10 NOTE — H&P
[facility] Hospital Medicine History & Physical Examination       Patient Name: Nel Recinos  MRN: [id number]  Patient Class: IP- Inpatient   Admission Date: 7/9/2025   Admitting Physician: JACKELINE Service   Length of Stay: 0  Attending Physician: Yousif Keating MD  Primary Care Provider: Minda Siddiqui DO  Face-to-Face encounter date: 07/10/2025  Code Status: Full   Chief Complaint: Shortness of Breath (Pt c/o SOB, dizziness, and fever since Tuesday. Pt also reports generalized weakness. Denies cough/congestion. Hx of afib, on eliquis. -819 in triage.)      Screening for Social Drivers for health:  Patient screened for food insecurity, housing instability, transportation needs, utility difficulties, and interpersonal safety (select all that apply as identified as concern)  []Housing or Food  []Transportation Needs  []Utility Difficulties  []Interpersonal safety  X None      Patient information was obtained from patient, patient's family, past medical records and ER records.  ED records were reviewed in detail and documented below    HISTORY OF PRESENT ILLNESS:   Nel Recinos is a 74 y.o. female who  has a past medical history of A-fib, Anxiety disorder, unspecified, Arthritis, Current use of long term anticoagulation (05/13/2025), GERD (gastroesophageal reflux disease), High cholesterol, HLD (hyperlipidemia), and Osteoarthritis.. The patient presented to [facility] on [facility]2025 with a primary complaint of  fever , generalized weakness, cough, sputum. Sinus and chest congestion, SOB and feeling unwell for two days. Symptoms are persistent since onset. On further questioning Pt reported associated nausea and diarrhea. Also reports occasional difficulty swallowing  but denies any recent chocking or aspiration episode.     Vitals on arrival , Temp 99.7, /91, , RR 20, SpO2 98%, Labs showed WBC 10.9, Hgb 11, Plt 193, Na 134, K 3.5, , Cr 0.7. mildly elevated AST/ALT,  Troponin neg, Lactic 1.3, TSH 1.9. EKG with A.fib with , CXR with evidence of Rt infrahilar lower lung lobe opacities. Subsequent CT chest showed Rt lower lung lobe large dense consolidation with mike dependent small pleura effusions. Blood cultures were drawn. Pt was given Diltiazem 5 mg bolus, Rocephin and Azithromycin in the ED. Pt was then admitted to  service for further management       PAST MEDICAL HISTORY:     Past Medical History:   Diagnosis Date    A-fib     Anxiety disorder, unspecified     Arthritis     Current use of long term anticoagulation 05/13/2025    GERD (gastroesophageal reflux disease)     High cholesterol     HLD (hyperlipidemia)     Osteoarthritis        PAST SURGICAL HISTORY:     Past Surgical History:   Procedure Laterality Date    APPENDECTOMY  1979    CARPAL TUNNEL RELEASE Left     FOOT SURGERY      HAND SURGERY      hysterectomy      HYSTERECTOMY  2013    JOINT REPLACEMENT  2017    Right Thumb    ROBOTIC ARTHROPLASTY,HIP,TOTAL,POSTERIOR APPROACH Left 11/27/2023    Procedure: ROBOTIC ARTHROPLASTY, HIP, TOTAL, POSTERIOR APPROACH;  Surgeon: Isidoro Waters MD;  Location: [facility];  Service: Orthopedics;  Laterality: Left;    TRANSFORAMINAL EPIDURAL INJECTION OF STEROID Bilateral 01/13/2025    Procedure: INJECTION, STEROID, EPIDURAL, TRANSFORAMINAL APPROACH     /////TFESI L4-L5 B/L;  Surgeon: Linsey Reed MD;  Location: [address];  Service: Pain Management;  Laterality: Bilateral;  TFESI L4-L5 B/L    TUBAL LIGATION  1982       ALLERGIES:   Tomato and Weed pollen    FAMILY HISTORY:   Reviewed and negative    SOCIAL HISTORY:     Social History     Tobacco Use    Smoking status: Never    Smokeless tobacco: Never   Substance Use Topics    Alcohol use: Yes     Alcohol/week: 1.0 standard drink of alcohol     Types: 1 Glasses of wine per week     Comment: Socially        HOME MEDICATIONS:     Prior to Admission medications    Medication Sig Start Date End Date Taking?  Authorizing Provider   ELIQUIS 5 mg Tab Take 5 mg by mouth 2 (two) times daily.   Yes Provider, Historical   ezetimibe (ZETIA) 10 mg tablet Take 1 tablet (10 mg total) by mouth once daily. 6/30/25 2/10/26 Yes Minda Siddiqui,    melatonin (MELATIN) 5 mg Take by mouth every evening.   Yes Provider, Historical   montelukast (SINGULAIR) 10 mg tablet Take 1 tablet (10 mg total) by mouth every evening. 6/30/25  Yes Minda Siddiqui,    pantoprazole (PROTONIX) 40 MG tablet Take 1 tablet by mouth every morning. 11/15/23  Yes Provider, Historical   sertraline (ZOLOFT) 50 MG tablet Take 50 mg by mouth.   Yes Provider, Historical   sotaloL (BETAPACE) 80 MG tablet Take 1 tablet (80 mg total) by mouth 2 (two) times daily. 12/1/23 7/9/25 Yes Fito Ball MD   fluticasone propionate (FLONASE) 50 mcg/actuation nasal spray USE 2 SPRAYS IN EACH NOSTRIL EVERY MORNING 3/27/24   Provider, Historical       REVIEW OF SYSTEMS:   Except as documented, all other systems reviewed and negative     PHYSICAL EXAM:     VITAL SIGNS: 24 HRS MIN & MAX LAST   Temp  Min: 97.7 °F (36.5 °C)  Max: 103.1 °F (39.5 °C) 98 °F (36.7 °C)   BP  Min: 90/64  Max: 146/99 120/76   Pulse  Min: 76  Max: 157  76   Resp  Min: 12  Max: 35 16   SpO2  Min: 92 %  Max: 98 % 95 %     General appearance: Well-developed, well-nourished female in no apparent distress.  HENT: Atraumatic head. Moist mucous membranes of oral cavity.  Eyes: Normal extraocular movements.   Neck: Supple.   Lungs: Decreased breath sounds Rt lower lobe   Heart: Irregular rhythm. S1 and S2 present with no murmurs/gallop/rub. No pedal edema. No JVD present.   Abdomen: Soft, non-distended, non-tender. No rebound tenderness/guarding. Bowel sounds are normal.   Extremities: No cyanosis, clubbing, or edema.  Skin: No Rash.   Neuro: Motor and sensory exams grossly intact. Good tone. Muscle strength 5/5 in all 4 extremities  Psych/mental status: Appropriate mood and affect. Responds appropriately to  questions.     LABS AND IMAGING:     Recent Labs   Lab 07/09/25  2218 07/10/25  0541   WBC 10.91 9.71   RBC 3.64* 3.55*   HGB 11.0* 10.5*   HCT 32.4* 31.7*   MCV 89.0 89.3   MCH 30.2 29.6   MCHC 34.0 33.1   RDW 13.4 13.7    181   MPV 9.8 9.9       Recent Labs   Lab 07/09/25  2218 07/10/25  0541   * 138   K 3.5 3.1*    105   CO2 20* 21*   BUN 12.8 9.3*   CREATININE 0.73 0.72   * 114   CALCIUM 9.5 8.6   MG  --  1.80   ALBUMIN 3.0* 2.5*   PROT 7.6 6.6   ALKPHOS 79 66   ALT 71* 58*   AST 70* 48*   BILITOT 1.0 0.7       Microbiology Results (last 7 days)       Procedure Component Value Units Date/Time    Clostridium Diff Toxin, A & B, EIA [[id number]]     Order Status: Sent Specimen: Stool     Stool Culture [[id number]]     Order Status: Sent Specimen: Stool     Respiratory Culture [[id number]]     Order Status: Sent Specimen: Sputum, Expectorated     Blood culture #1 **CANNOT BE ORDERED STAT** [[id number]] Collected: 07/09/25 2222    Order Status: Resulted Specimen: Blood Updated: 07/09/25 2227    Blood culture #2 **CANNOT BE ORDERED STAT** [[id number]] Collected: 07/09/25 2222    Order Status: Resulted Specimen: Blood Updated: 07/09/25 2227             Echo    Left Ventricle: The left ventricle is normal in size. Normal wall   thickness. There is normal systolic function. Ejection fraction is   approximately 55%.    Right Ventricle: The right ventricle is normal in size Systolic   function is normal. TAPSE is 2.0 cm.    Mitral Valve: There is mild regurgitation.    Tricuspid Valve: There is mild regurgitation.  CT Chest Without Contrast  Narrative: EXAMINATION:  CT CHEST WITHOUT CONTRAST    CLINICAL HISTORY:  Pneumonia, unresolved;    TECHNIQUE:  Multidetector axial images were performed from thoracic inlet to below hemidiaphragms without intravenous contrast administration. Sagittal and coronal reformations performed.    Dose length product of 302 mGycm. Automated exposure control was  utilized to minimize radiation dose.    COMPARISON:  Chest radiograph July 9, 2025.    FINDINGS:  Large consolidation involves the right lower lung lobe combined with atelectasis and air bronchograms.  There are bilateral dependent small bowel pleural effusions. There are also left lower lung lobe atelectatic changes without convincing consolidation.  Lungs hypoventilatory changes without overt edema.  There are no cystic formation, bronchiectasis or cavitary abnormality.    Thoracic aorta is without aneurysmal dilatation. Cardiac chamber size is within normal limits. Trace of pericardial effusion with maximum thickness of 4 mm.  There is precarinal 1.2 x 1.5 cm enlarged lymph node on image 140 series 10.  There is also paratracheal 1.0 x 1.2 cm enlarged lymph node.    Partially visualized left kidney collecting system mild dilatation.  No acute findings of the unenhanced upper unenhanced abdomen viscera.  Minimal thoracic degenerative changes.  Impression: 1. Right lower lung lobe large dense consolidation.    2. Bilateral dependent small pleural effusions.    Electronically signed by: Brenton Caldwell  Date:    07/10/2025  Time:    11:46      ASSESSMENT & PLAN:     Community Acquired pneumonia involving RLL due to unspecified organism, POA  Normocytic Anemia/ Anemia of chronic disease- mild-POA  Hypokalemia, mild -POA  Elevated LFT/Transaminitis , POA  Acute Gastroenteritis, POA  Paroxysmal atrial fibrillation with RVR, POA  Intermittent dysphagia for solid, per history -POA    Hx- HLD, Anxiety, OA, GERD    Plan-   Follow up blood cultures   Modify antibiotic to Zosyn and Doxycyline   Resume home Sotalolol and wean Diltiazem gtt  Consult Cardiology for PAF with RVR  Correct and replete electrolytes with goal potassium >4, Mg>2  Monitor LFTs  Stool for GI panel, WBC and Culture as well as C.diff toxins   SLP consult to assess aspiration risk   Supportive treatment with antitussive, expectorant   Home meds are reviewed  and resumed as appropriate.    Critical care note:  Critical care diagnosis: A.fib with RVR requiring IV Diltiazem   Critical care interventions: Hands-on evaluation, review of labs/radiographs/records and discussion with patient and family if present  Critical care time spent: 35 minutes        VTE Prophylaxis:  Home Eliquis     Patient condition:  Fair     __________________________________________________________________________  INPATIENT LIST OF MEDICATIONS     Scheduled Meds:   apixaban  5 mg Oral BID    dextromethorphan-guaiFENesin  mg/5 ml  10 mL Oral Q6H    diltiaZEM  60 mg Oral Q6H    doxycycline  100 mg Oral Q12H    ezetimibe  10 mg Oral Daily    Lactobacillus rhamnosus GG  1 packet Oral Daily    montelukast  10 mg Oral QHS    pantoprazole  40 mg Oral QAM    piperacillin-tazobactam (Zosyn) IV (PEDS and ADULTS) (extended infusion is not appropriate)  4.5 g Intravenous Q8H    sertraline  50 mg Oral QHS    sotaloL  80 mg Oral BID     Continuous Infusions:   dilTIAZem  0-15 mg/hr Intravenous Continuous   Stopped at 07/10/25 1305     PRN Meds:.  Current Facility-Administered Medications:     acetaminophen, 1,000 mg, Oral, Q6H PRN    aluminum-magnesium hydroxide-simethicone, 30 mL, Oral, QID PRN    benzonatate, 100 mg, Oral, TID PRN    bisacodyL, 10 mg, Rectal, Daily PRN    dextrose 50%, 12.5 g, Intravenous, PRN    dextrose 50%, 25 g, Intravenous, PRN    glucagon (human recombinant), 1 mg, Intramuscular, PRN    glucose, 16 g, Oral, PRN    glucose, 24 g, Oral, PRN    levalbuterol, 0.63 mg, Nebulization, Q4H PRN    loperamide, 4 mg, Oral, QID PRN    melatonin, 6 mg, Oral, Nightly PRN    naloxone, 0.02 mg, Intravenous, PRN    ondansetron, 4 mg, Intravenous, Q4H PRN    prochlorperazine, 5 mg, Intravenous, Q6H PRN    senna-docusate, 1 tablet, Oral, BID PRN    sodium chloride 0.9%, 10 mL, Intravenous, PRN      Discharge Planning and Disposition: No mobility needs. Ambulating well. Good social support system.    Anticipated discharge    If patient was admitted under observational status it is with my approval/permission.        All diagnosis and differential diagnosis have been reviewed; assessment and plan has been documented; I have personally reviewed the labs and test results that are presently available; I have reviewed the patients medication list; I have reviewed the consulting providers response and recommendations. I have reviewed or attempted to review medical records based upon their availability.    All of the patient and family questions have been addressed and answered. Patient's is agreeable to the above stated plan. I will continue to monitor closely and make adjustments to medical management as needed.    If patient was admitted under observational status it is with my approval/permission.      Yousif Keating MD   07/10/2025

## 2025-07-10 NOTE — ED TRIAGE NOTES
Pt c/o SOB, dizziness, and fever since Tuesday. Pt also reports generalized weakness. Denies cough/congestion. Hx of afib, on eliquis. -749 in triage.

## 2025-07-10 NOTE — ED PROVIDER NOTES
Encounter Date: 7/9/2025    SCRIBE #1 NOTE: I, Barbara Moore, am scribing for, and in the presence of,  Thomas Bowman IV, MD. I have scribed the following portions of the note - the EKG reading. Other sections scribed: HPI, ROS, PE.       History     Chief Complaint   Patient presents with    Shortness of Breath     Pt c/o SOB, dizziness, and fever since Tuesday. Pt also reports generalized weakness. Denies cough/congestion. Hx of afib, on eliquis. -118 in triage.     73 yo female presents to ED complaining of shortness of breath. Pt reports within the past week she has experienced SOB, cough, leg swelling, nausea, vomiting, fever, and fatigue. Pt reports a history of Afib in which she takes Eliquis for. Pt denies any chest pain, abdominal pain, dysuria, and has no other complaints at this time.    The history is provided by the patient. No  was used.     Review of patient's allergies indicates:   Allergen Reactions    Tomato     Weed pollen      Past Medical History:   Diagnosis Date    A-fib     Anxiety disorder, unspecified     Arthritis     Current use of long term anticoagulation 05/13/2025    GERD (gastroesophageal reflux disease)     High cholesterol     HLD (hyperlipidemia)     Osteoarthritis      Past Surgical History:   Procedure Laterality Date    APPENDECTOMY  1979    CARPAL TUNNEL RELEASE Left     FOOT SURGERY      HAND SURGERY      hysterectomy      HYSTERECTOMY  2013    JOINT REPLACEMENT  2017    Right Thumb    ROBOTIC ARTHROPLASTY,HIP,TOTAL,POSTERIOR APPROACH Left 11/27/2023    Procedure: ROBOTIC ARTHROPLASTY, HIP, TOTAL, POSTERIOR APPROACH;  Surgeon: Isidoro Waters MD;  Location: [facility];  Service: Orthopedics;  Laterality: Left;    TRANSFORAMINAL EPIDURAL INJECTION OF STEROID Bilateral 01/13/2025    Procedure: INJECTION, STEROID, EPIDURAL, TRANSFORAMINAL APPROACH     /////TFESI L4-L5 B/L;  Surgeon: Linsey Reed MD;  Location: [address];  Service: Pain  Management;  Laterality: Bilateral;  TFESI L4-L5 B/L    TUBAL LIGATION  1982     Family History   Problem Relation Name Age of Onset    Alcohol abuse Mother aurelia meyer     Alcohol abuse Sister toro bourne     Arthritis Sister toro bourne     Stroke Sister toro bourne     Breast cancer Sister lori hsieh      Social History[1]  Review of Systems   Constitutional:  Positive for fatigue and fever. Negative for chills.   HENT:  Negative for congestion, rhinorrhea and sore throat.    Eyes:  Negative for visual disturbance.   Respiratory:  Positive for cough and shortness of breath.    Cardiovascular:  Positive for leg swelling. Negative for chest pain.   Gastrointestinal:  Positive for nausea and vomiting. Negative for abdominal pain.   Genitourinary:  Negative for dysuria, hematuria, vaginal bleeding and vaginal discharge.   Musculoskeletal:  Negative for joint swelling.   Skin:  Negative for rash.   Neurological:  Negative for weakness.   Psychiatric/Behavioral:  Negative for confusion.        Physical Exam     Initial Vitals [07/09/25 2157]   BP Pulse Resp Temp SpO2   (!) 137/91 (!) 147 20 99.7 °F (37.6 °C) 98 %      MAP       --         Physical Exam    Nursing note and vitals reviewed.  Constitutional: She is not diaphoretic. No distress.   HENT:   Head: Normocephalic and atraumatic. Mouth/Throat: Mucous membranes are dry.   Neck: Neck supple.   Normal range of motion.  Cardiovascular:  Regular rhythm.   Tachycardia present.         No murmur heard.  Pulmonary/Chest: Breath sounds normal. No respiratory distress.   Hypoxic, requiring supplemental oxygen.   Abdominal: Abdomen is soft. She exhibits no distension. There is no abdominal tenderness.   Musculoskeletal:      Cervical back: Normal range of motion and neck supple.     Neurological: She is alert and oriented to person, place, and time. She has normal strength. No cranial nerve deficit or sensory deficit.   Skin: Skin is warm. Capillary refill takes  less than 2 seconds.   Psychiatric: She has a normal mood and affect.         ED Course   Critical Care    Date/Time: 7/10/2025 3:57 AM    Performed by: Thomas Bowman IV, MD  Authorized by: Thomas Bowman IV, MD  Direct patient critical care time: 9 minutes  Additional history critical care time: 8 minutes  Ordering / reviewing critical care time: 7 minutes  Documentation critical care time: 7 minutes  Consulting other physicians critical care time: 7 minutes  Consult with family critical care time: 6 minutes  Total critical care time (exclusive of procedural time) : 44 minutes  Critical care time was exclusive of separately billable procedures and treating other patients.  Critical care was necessary to treat or prevent imminent or life-threatening deterioration of the following conditions: cardiac failure.  Critical care was time spent personally by me on the following activities: blood draw for specimens, development of treatment plan with patient or surrogate, discussions with consultants, examination of patient, evaluation of patient's response to treatment, interpretation of cardiac output measurements, ordering and review of radiographic studies, ordering and review of laboratory studies, re-evaluation of patient's condition, pulse oximetry and review of old charts.        Labs Reviewed   COMPREHENSIVE METABOLIC PANEL - Abnormal       Result Value    Sodium 134 (*)     Potassium 3.5      Chloride 101      CO2 20 (*)     Glucose 136 (*)     Blood Urea Nitrogen 12.8      Creatinine 0.73      Calcium 9.5      Protein Total 7.6      Albumin 3.0 (*)     Globulin 4.6 (*)     Albumin/Globulin Ratio 0.7 (*)     Bilirubin Total 1.0      ALP 79      ALT 71 (*)     AST 70 (*)     eGFR >60      Anion Gap 13.0      BUN/Creatinine Ratio 18     B-TYPE NATRIURETIC PEPTIDE - Abnormal    Natriuretic Peptide 179.4 (*)    CBC WITH DIFFERENTIAL - Abnormal    WBC 10.91      RBC 3.64 (*)     Hgb 11.0 (*)     Hct 32.4 (*)     MCV  89.0      MCH 30.2      MCHC 34.0      RDW 13.4      Platelet 193      MPV 9.8      Neut % 89.6      Lymph % 5.2      Mono % 4.5      Eos % 0.0      Basophil % 0.2      Imm Grans % 0.5      Neut # 9.77 (*)     Lymph # 0.57 (*)     Mono # 0.49      Eos # 0.00      Baso # 0.02      Imm Gran # 0.06 (*)     NRBC% 0.0     TROPONIN I - Normal    Troponin-I <0.010     COVID/FLU A&B PCR - Normal    Influenza A PCR Not Detected      Influenza B PCR Not Detected      SARS-CoV-2 PCR Not Detected      Narrative:     The Xpert Xpress SARS-CoV-2/FLU/RSV plus is a rapid, multiplexed real-time PCR test intended for the simultaneous qualitative detection and differentiation of SARS-CoV-2, Influenza A, Influenza B, and respiratory syncytial virus (RSV) viral RNA in either nasopharyngeal swab or nasal swab specimens.         LACTIC ACID, PLASMA - Normal    Lactic Acid Level 1.3     TSH - Normal    TSH 1.920     BLOOD CULTURE [facility]   BLOOD CULTURE [facility]   CBC W/ AUTO DIFFERENTIAL    Narrative:     The following orders were created for panel order CBC auto differential.  Procedure                               Abnormality         Status                     ---------                               -----------         ------                     CBC with Differential[[id number]]       Abnormal            Final result                 Please view results for these tests on the individual orders.   CBC W/ AUTO DIFFERENTIAL    Narrative:     The following orders were created for panel order CBC Auto Differential.  Procedure                               Abnormality         Status                     ---------                               -----------         ------                     CBC with Differential[[id number]]                                                        Please view results for these tests on the individual orders.   COMPREHENSIVE METABOLIC PANEL   MAGNESIUM   CBC WITH DIFFERENTIAL     EKG Readings: (Independently Interpreted)    Initial Reading: No STEMI. Rhythm: Atrial Flutter. Heart Rate: 144. Ectopy: No Ectopy. Conduction: 1st Degree AV Block. ST Segments: Normal ST Segments. T Waves: Normal. Axis: Normal. Clinical Impression: Atrial Flutter Other Impression: No ischemic changes.   Taken at 2222.       Imaging Results              X-Ray Chest AP (Final result)  Result time 07/09/25 22:26:19      Final result by Brenton Caldwell MD (07/09/25 22:26:19)                   Impression:      Right infrahilar lower lung lobe opacities may represent atelectasis without exclusion of developing infiltrates.  Please correlate clinically.      Electronically signed by: Brenton Caldwell  Date:    07/09/2025  Time:    22:26               Narrative:    EXAMINATION:  XR CHEST AP PORTABLE    CLINICAL HISTORY:  SOB;    TECHNIQUE:  One view    COMPARISON:  July 7, 2025.    FINDINGS:  Cardiopericardial silhouette is within normal limits.  There are right infrahilar basilar opacities which could be related to atelectasis without exclusion of developing infiltrates.  Please correlate clinically.  No pulmonary edema, significant pleural effusion or pneumothorax                                       Medications   diltiaZEM 125 mg in dextrose 5 % 125 mL IVPB (ready to mix) (titrating) (5 mg/hr Intravenous New Bag 7/10/25 0026)   enoxaparin injection 60 mg (has no administration in time range)   azithromycin (ZITHROMAX) 500 mg in D5W 250 mL  IVPB (admixture device) (has no administration in time range)   cefTRIAXone injection 1 g (has no administration in time range)   sodium chloride 0.9% flush 10 mL (has no administration in time range)   melatonin tablet 6 mg (has no administration in time range)   ondansetron injection 4 mg (has no administration in time range)   prochlorperazine injection Soln 5 mg (has no administration in time range)   senna-docusate 8.6-50 mg per tablet 1 tablet (has no administration in time range)   bisacodyL suppository 10 mg (has no  administration in time range)   aluminum-magnesium hydroxide-simethicone 200-200-20 mg/5 mL suspension 30 mL (has no administration in time range)   acetaminophen tablet 1,000 mg (has no administration in time range)   naloxone 0.4 mg/mL injection 0.02 mg (has no administration in time range)   glucose chewable tablet 16 g (has no administration in time range)   glucose chewable tablet 24 g (has no administration in time range)   dextrose 50% injection 12.5 g (has no administration in time range)   dextrose 50% injection 25 g (has no administration in time range)   glucagon (human recombinant) injection 1 mg (has no administration in time range)   levalbuterol nebulizer solution 0.63 mg (0.63 mg Nebulization Given 7/10/25 0342)   diltiaZEM injection 20 mg (20 mg Intravenous Given 7/9/25 2259)   0.9% NaCl infusion (1,000 mLs Intravenous New Bag 7/9/25 2230)   cefTRIAXone injection 1 g (1 g Intravenous Given 7/9/25 2358)   azithromycin (ZITHROMAX) 500 mg in 0.9% NaCl 250 mL IVPB (admixture device) (0 mg Intravenous Stopped 7/10/25 0059)   acetaminophen 1,000 mg/100 mL (10 mg/mL) injection 1,000 mg (0 mg Intravenous Stopped 7/10/25 0141)   diltiaZEM injection 10 mg (10 mg Intravenous Given 7/10/25 0208)     Medical Decision Making  74-year-old with a history of AFib noncompliant with Eliquis presenting with shortness of breath fever cough  Patient AFib RVR on arrival hypoxic requiring supplemental oxygen  Started antibiotics for pneumonia on x-ray,   Fluids and diltiazem bolus and drip with improvement in heart rate  Blood culture sent hospitalist will admit    Differential diagnosis (including but not limited to):   Judging by the patient's chief complaint and pertinent history, the patient has the following possible differential diagnoses, including but not limited to the following.  Some of these are deemed to be lower likelihood and some more likely based on my physical exam and history combined with possible lab  work and/or imaging studies.   Please see the pertinent studies, and refer to the HPI.  Some of these diagnoses will take further evaluation to fully rule out, perhaps as an outpatient and the patient was encouraged to follow up when discharged for more comprehensive evaluation.    ACS, pneumonia, COVID/Flu, congestive heart failure, asthma, COPD, pleural effusion, pulmonary edema, acute bronchitis, PE, pneumothorax, hemothorax, aortic dissection, electrolyte abnormalities, anemia, anxiety       Problems Addressed:  Acute respiratory failure with hypoxia: acute illness or injury that poses a threat to life or bodily functions  Atrial fibrillation with RVR: acute illness or injury that poses a threat to life or bodily functions  Pneumonia: acute illness or injury that poses a threat to life or bodily functions  SOB (shortness of breath): acute illness or injury that poses a threat to life or bodily functions    Amount and/or Complexity of Data Reviewed  Labs: ordered.  Radiology: ordered and independent interpretation performed. Decision-making details documented in ED Course.     Details: CXR - RLL pneumonia   ECG/medicine tests: ordered and independent interpretation performed.     Details: Initial Reading: No STEMI. Rhythm: Atrial Flutter. Heart Rate: 144. Ectopy: No Ectopy. Conduction: Variable AV Block. ST Segments: Normal ST Segments. T Waves: Normal. Axis: Normal. Clinical Impression: Atrial Flutter Other Impression: No ischemic changes.   Taken at 2222.   Discussion of management or test interpretation with external provider(s): Hospitalist - will admit    Risk  Prescription drug management.  Drug therapy requiring intensive monitoring for toxicity.  Decision regarding hospitalization.            Scribe Attestation:   Scribe #1: I performed the above scribed service and the documentation accurately describes the services I performed. I attest to the accuracy of the note.    Attending Attestation:            Physician Attestation for Scribe:  Physician Attestation Statement for Scribe #1: IColby Adolph B IV, MD, reviewed documentation, as scribed by Barbara Moore in my presence, and it is both accurate and complete.                                    Clinical Impression:  Final diagnoses:  [R06.02] SOB (shortness of breath)  [J18.9] Pneumonia  [J96.01] Acute respiratory failure with hypoxia (Primary)  [I48.91] Atrial fibrillation with RVR          ED Disposition Condition    Admit                         [1]   Social History  Tobacco Use    Smoking status: Never    Smokeless tobacco: Never   Substance Use Topics    Alcohol use: Yes     Alcohol/week: 1.0 standard drink of alcohol     Types: 1 Glasses of wine per week     Comment: Socially    Drug use: Never        Thomas Bowman IV, MD  07/10/25 3462

## 2025-07-10 NOTE — CONSULTS
Consults  [facility]    Cardiology  Consult Note    Patient Name: Nel Recinos  MRN: [id number]  Admission Date: 7/9/2025  Hospital Length of Stay: 0 days  Code Status: Full Code   Attending Provider: Yousif Keating MD   Consulting Provider: APOLINAR Shah  Primary Care Physician: Minda Siddiqui DO  Principal Problem:<principal problem not specified>    Patient information was obtained from patient and ER records.     Subjective:     Chief Complaint/Reason for Consult: Atrial Fibrillation RVR    HPI:   Ms. Nel Recinos is a 74 year old, known to Dr. Staton, who presented to [facility] on 7.10.25 with c/o of SOB, dizziness, and fever. She has a history of atrial fibrillation on eliquis, HLD, and anxiety. Upon workup, temp was 103, AST 70, ALT 71, EKG revealed atrial flutter RVR. CT of the chest revealed right lower lobe lung consolidation and bilateral pleural effusions. [facility] is being consulted for atrial flutter.      PMH: atrial fibrillation on eliquis, HLD, and anxiety.  PSH: appendectomy, carpal tunnel surgery, foot and hand surgery, hip replacement, tubal ligation  Family History: Mother-liver disease, Father-heart disease  Social History: Denied tobacco and drug use    Previous Cardiac Diagnostics:   Echocardiogram 7.10.25  Left Ventricle: The left ventricle is normal in size. Normal wall thickness. There is normal systolic function. Ejection fraction is approximately 55%.  Right Ventricle: The right ventricle is normal in size Systolic function is normal. TAPSE is 2.0 cm.  Mitral Valve: There is mild regurgitation.  Tricuspid Valve: There is mild regurgitation.       Past Medical History:   Diagnosis Date    A-fib     Anxiety disorder, unspecified     Arthritis     Current use of long term anticoagulation 05/13/2025    GERD (gastroesophageal reflux disease)     High cholesterol     HLD (hyperlipidemia)     Osteoarthritis      Past Surgical History:   Procedure Laterality Date     APPENDECTOMY  1979    CARPAL TUNNEL RELEASE Left     FOOT SURGERY      HAND SURGERY      hysterectomy      HYSTERECTOMY  2013    JOINT REPLACEMENT  2017    Right Thumb    ROBOTIC ARTHROPLASTY,HIP,TOTAL,POSTERIOR APPROACH Left 11/27/2023    Procedure: ROBOTIC ARTHROPLASTY, HIP, TOTAL, POSTERIOR APPROACH;  Surgeon: Isidoro Waters MD;  Location: [facility] OR;  Service: Orthopedics;  Laterality: Left;    TRANSFORAMINAL EPIDURAL INJECTION OF STEROID Bilateral 01/13/2025    Procedure: INJECTION, STEROID, EPIDURAL, TRANSFORAMINAL APPROACH     /////TFESI L4-L5 B/L;  Surgeon: Linsey Reed MD;  Location: [address] [facility] OR;  Service: Pain Management;  Laterality: Bilateral;  TFESI L4-L5 B/L    TUBAL LIGATION  1982     Review of patient's allergies indicates:   Allergen Reactions    Tomato     Weed pollen      No current facility-administered medications on file prior to encounter.     Current Outpatient Medications on File Prior to Encounter   Medication Sig    ELIQUIS 5 mg Tab Take 5 mg by mouth 2 (two) times daily.    ezetimibe (ZETIA) 10 mg tablet Take 1 tablet (10 mg total) by mouth once daily.    melatonin (MELATIN) 5 mg Take by mouth every evening.    montelukast (SINGULAIR) 10 mg tablet Take 1 tablet (10 mg total) by mouth every evening.    pantoprazole (PROTONIX) 40 MG tablet Take 1 tablet by mouth every morning.    sertraline (ZOLOFT) 50 MG tablet Take 50 mg by mouth.    sotaloL (BETAPACE) 80 MG tablet Take 1 tablet (80 mg total) by mouth 2 (two) times daily.    fluticasone propionate (FLONASE) 50 mcg/actuation nasal spray USE 2 SPRAYS IN EACH NOSTRIL EVERY MORNING     Family History       Problem Relation (Age of Onset)    Alcohol abuse Mother, Sister    Arthritis Sister    Breast cancer Sister    Stroke Sister          Tobacco Use    Smoking status: Never    Smokeless tobacco: Never   Substance and Sexual Activity    Alcohol use: Yes     Alcohol/week: 1.0 standard drink of alcohol     Types: 1 Glasses of wine  "per week     Comment: Socially    Drug use: Never    Sexual activity: Not Currently     Partners: Male     Birth control/protection: See Surgical Hx       Review of Systems   Constitutional:  Positive for fatigue and fever.   Respiratory:  Positive for cough.    All other systems reviewed and are negative.    Objective:     Vital Signs (Most Recent):  Temp: 97.9 °F (36.6 °C) (07/10/25 1221)  Pulse: 84 (07/10/25 1221)  Resp: 18 (07/10/25 0929)  BP: 112/61 (07/10/25 1221)  SpO2: 96 % (07/10/25 1221) Vital Signs (24h Range):  Temp:  [97.8 °F (36.6 °C)-103.1 °F (39.5 °C)] 97.9 °F (36.6 °C)  Pulse:  [] 84  Resp:  [12-35] 18  SpO2:  [92 %-98 %] 96 %  BP: ()/(61-99) 112/61   Weight: 63.5 kg (140 lb)  Body mass index is 23.3 kg/m².  SpO2: 96 %     No intake or output data in the 24 hours ending 07/10/25 1345  Lines/Drains/Airways       Peripheral Intravenous Line  Duration             Peripheral IV 07/09/25 2218 18 G Anterior;Right Forearm <1 day    Peripheral IV 07/10/25 0021 18 G Right Antecubital <1 day                  Significant Labs:   Chemistries:   Recent Labs   Lab 07/09/25  2218 07/10/25  0541   * 138   K 3.5 3.1*    105   CO2 20* 21*   BUN 12.8 9.3*   CREATININE 0.73 0.72   CALCIUM 9.5 8.6   PROT 7.6 6.6   BILITOT 1.0 0.7   ALKPHOS 79 66   ALT 71* 58*   AST 70* 48*   MG  --  1.80   TROPONINI <0.010  --         CBC/Anemia Labs: Coags:    Recent Labs   Lab 07/09/25  2218 07/10/25  0541   WBC 10.91 9.71   HGB 11.0* 10.5*   HCT 32.4* 31.7*    181   MCV 89.0 89.3   RDW 13.4 13.7    No results for input(s): "PT", "INR", "APTT" in the last 168 hours.     Significant Imaging:  Imaging Results              X-Ray Chest AP (Final result)  Result time 07/09/25 22:26:19      Final result by Brenton Caldwell MD (07/09/25 22:26:19)                   Impression:      Right infrahilar lower lung lobe opacities may represent atelectasis without exclusion of developing infiltrates.  Please correlate " clinically.      Electronically signed by: Brenton Caldwell  Date:    07/09/2025  Time:    22:26               Narrative:    EXAMINATION:  XR CHEST AP PORTABLE    CLINICAL HISTORY:  SOB;    TECHNIQUE:  One view    COMPARISON:  July 7, 2025.    FINDINGS:  Cardiopericardial silhouette is within normal limits.  There are right infrahilar basilar opacities which could be related to atelectasis without exclusion of developing infiltrates.  Please correlate clinically.  No pulmonary edema, significant pleural effusion or pneumothorax                                    EKG:     Telemetry:  AF    Physical Exam  Vitals reviewed.   Constitutional:       Appearance: Normal appearance.   HENT:      Mouth/Throat:      Mouth: Mucous membranes are moist.   Cardiovascular:      Rate and Rhythm: Normal rate. Rhythm irregular.      Pulses: Normal pulses.      Heart sounds: Normal heart sounds.   Pulmonary:      Effort: Pulmonary effort is normal.      Breath sounds: Normal breath sounds.   Abdominal:      General: Bowel sounds are normal.      Palpations: Abdomen is soft.   Musculoskeletal:         General: Normal range of motion.      Cervical back: Normal range of motion.   Skin:     General: Skin is warm and dry.      Capillary Refill: Capillary refill takes less than 2 seconds.   Neurological:      Mental Status: She is alert and oriented to person, place, and time.       Home Medications:   Medications Ordered Prior to Encounter[1]  Current Schedule Inpatient Medications:   apixaban  5 mg Oral BID    cefTRIAXone (Rocephin) IV (PEDS and ADULTS)  1 g Intravenous Q24H    dextromethorphan-guaiFENesin  mg/5 ml  10 mL Oral Q6H    doxycycline  100 mg Oral Q12H    ezetimibe  10 mg Oral Daily    montelukast  10 mg Oral QHS    pantoprazole  40 mg Oral QAM    potassium chloride  40 mEq Oral Q4H    sertraline  50 mg Oral QHS    sotaloL  80 mg Oral BID     Continuous Infusions:   dilTIAZem  0-15 mg/hr Intravenous Continuous 5 mL/hr at  07/10/25 0026 5 mg/hr at 07/10/25 0026     Assessment:   PAF  Chads-Vasc 2  Pneumonia  UTI  HLD  Asthma  Anxiety      Plan:     Start cardizem 60 mg qid  Continue sotolol 80 mg po bid  Continue eliquis 5 mg po bid  Keep K>4 and Mag>2  Labs and EKG in am: CBC, CMP and Mag      Thank you for your consult.     APOLINAR Shah  Cardiology  [facility]          [1]   No current facility-administered medications on file prior to encounter.     Current Outpatient Medications on File Prior to Encounter   Medication Sig Dispense Refill    ELIQUIS 5 mg Tab Take 5 mg by mouth 2 (two) times daily.      ezetimibe (ZETIA) 10 mg tablet Take 1 tablet (10 mg total) by mouth once daily. 90 tablet 3    melatonin (MELATIN) 5 mg Take by mouth every evening.      montelukast (SINGULAIR) 10 mg tablet Take 1 tablet (10 mg total) by mouth every evening. 90 tablet 3    pantoprazole (PROTONIX) 40 MG tablet Take 1 tablet by mouth every morning.      sertraline (ZOLOFT) 50 MG tablet Take 50 mg by mouth.      sotaloL (BETAPACE) 80 MG tablet Take 1 tablet (80 mg total) by mouth 2 (two) times daily. 60 tablet 1    fluticasone propionate (FLONASE) 50 mcg/actuation nasal spray USE 2 SPRAYS IN EACH NOSTRIL EVERY MORNING

## 2025-07-11 LAB
ADV 40+41 DNA STL QL NAA+NON-PROBE: NOT DETECTED
ALBUMIN SERPL-MCNC: 2.2 G/DL (ref 3.4–4.8)
ALBUMIN/GLOB SERPL: 0.5 RATIO (ref 1.1–2)
ALP SERPL-CCNC: 69 UNIT/L (ref 40–150)
ALT SERPL-CCNC: 60 UNIT/L (ref 0–55)
ANION GAP SERPL CALC-SCNC: 8 MEQ/L
AST SERPL-CCNC: 53 UNIT/L (ref 11–45)
ASTRO TYP 1-8 RNA STL QL NAA+NON-PROBE: NOT DETECTED
BASOPHILS # BLD AUTO: 0.02 X10(3)/MCL
BASOPHILS NFR BLD AUTO: 0.2 %
BILIRUB SERPL-MCNC: 0.7 MG/DL
BUN SERPL-MCNC: 12.2 MG/DL (ref 9.8–20.1)
C CAYETANENSIS DNA STL QL NAA+NON-PROBE: NOT DETECTED
C COLI+JEJ+UPSA DNA STL QL NAA+NON-PROBE: NOT DETECTED
CALCIUM SERPL-MCNC: 8.8 MG/DL (ref 8.4–10.2)
CHLORIDE SERPL-SCNC: 106 MMOL/L (ref 98–107)
CO2 SERPL-SCNC: 22 MMOL/L (ref 23–31)
CREAT SERPL-MCNC: 0.69 MG/DL (ref 0.55–1.02)
CREAT/UREA NIT SERPL: 18
CRYPTOSP DNA STL QL NAA+NON-PROBE: NOT DETECTED
E HISTOLYT DNA STL QL NAA+NON-PROBE: NOT DETECTED
EAEC PAA PLAS AGGR+AATA ST NAA+NON-PRB: NOT DETECTED
EC STX1+STX2 GENES STL QL NAA+NON-PROBE: NOT DETECTED
EOSINOPHIL # BLD AUTO: 0.01 X10(3)/MCL (ref 0–0.9)
EOSINOPHIL NFR BLD AUTO: 0.1 %
EPEC EAE GENE STL QL NAA+NON-PROBE: NOT DETECTED
ERYTHROCYTE [DISTWIDTH] IN BLOOD BY AUTOMATED COUNT: 14.3 % (ref 11.5–17)
ETEC LTA+ST1A+ST1B TOX ST NAA+NON-PROBE: NOT DETECTED
G LAMBLIA DNA STL QL NAA+NON-PROBE: NOT DETECTED
GFR SERPLBLD CREATININE-BSD FMLA CKD-EPI: >60 ML/MIN/1.73/M2
GLOBULIN SER-MCNC: 4.3 GM/DL (ref 2.4–3.5)
GLUCOSE SERPL-MCNC: 102 MG/DL (ref 82–115)
HCT VFR BLD AUTO: 29.5 % (ref 37–47)
HGB BLD-MCNC: 9.8 G/DL (ref 12–16)
IMM GRANULOCYTES # BLD AUTO: 0.04 X10(3)/MCL (ref 0–0.04)
IMM GRANULOCYTES NFR BLD AUTO: 0.5 %
LYMPHOCYTES # BLD AUTO: 0.44 X10(3)/MCL (ref 0.6–4.6)
LYMPHOCYTES NFR BLD AUTO: 5 %
MAGNESIUM SERPL-MCNC: 2 MG/DL (ref 1.6–2.6)
MCH RBC QN AUTO: 29.9 PG (ref 27–31)
MCHC RBC AUTO-ENTMCNC: 33.2 G/DL (ref 33–36)
MCV RBC AUTO: 89.9 FL (ref 80–94)
MONOCYTES # BLD AUTO: 0.35 X10(3)/MCL (ref 0.1–1.3)
MONOCYTES NFR BLD AUTO: 4 %
NEUTROPHILS # BLD AUTO: 7.94 X10(3)/MCL (ref 2.1–9.2)
NEUTROPHILS NFR BLD AUTO: 90.2 %
NOROVIRUS GI+II RNA STL QL NAA+NON-PROBE: NOT DETECTED
NRBC BLD AUTO-RTO: 0 %
OHS QRS DURATION: 74 MS
OHS QTC CALCULATION: 469 MS
P SHIGELLOIDES DNA STL QL NAA+NON-PROBE: NOT DETECTED
PLATELET # BLD AUTO: 221 X10(3)/MCL (ref 130–400)
PMV BLD AUTO: 10.3 FL (ref 7.4–10.4)
POTASSIUM SERPL-SCNC: 3.8 MMOL/L (ref 3.5–5.1)
PROT SERPL-MCNC: 6.5 GM/DL (ref 5.8–7.6)
RBC # BLD AUTO: 3.28 X10(6)/MCL (ref 4.2–5.4)
RVA RNA STL QL NAA+NON-PROBE: NOT DETECTED
S ENT+BONG DNA STL QL NAA+NON-PROBE: NOT DETECTED
SAPO I+II+IV+V RNA STL QL NAA+NON-PROBE: NOT DETECTED
SHIGELLA SP+EIEC IPAH ST NAA+NON-PROBE: NOT DETECTED
SODIUM SERPL-SCNC: 136 MMOL/L (ref 136–145)
V CHOL+PARA+VUL DNA STL QL NAA+NON-PROBE: NOT DETECTED
V CHOLERAE DNA STL QL NAA+NON-PROBE: NOT DETECTED
WBC # BLD AUTO: 8.8 X10(3)/MCL (ref 4.5–11.5)
Y ENTEROCOL DNA STL QL NAA+NON-PROBE: NOT DETECTED

## 2025-07-11 PROCEDURE — 87147 CULTURE TYPE IMMUNOLOGIC: CPT | Performed by: INTERNAL MEDICINE

## 2025-07-11 PROCEDURE — 36415 COLL VENOUS BLD VENIPUNCTURE: CPT | Performed by: NURSE PRACTITIONER

## 2025-07-11 PROCEDURE — 25000003 PHARM REV CODE 250: Performed by: INTERNAL MEDICINE

## 2025-07-11 PROCEDURE — 63600175 PHARM REV CODE 636 W HCPCS: Performed by: INTERNAL MEDICINE

## 2025-07-11 PROCEDURE — 25000003 PHARM REV CODE 250: Performed by: NURSE PRACTITIONER

## 2025-07-11 PROCEDURE — 94760 N-INVAS EAR/PLS OXIMETRY 1: CPT

## 2025-07-11 PROCEDURE — 25000003 PHARM REV CODE 250: Performed by: SPECIALIST

## 2025-07-11 PROCEDURE — 63600175 PHARM REV CODE 636 W HCPCS: Performed by: SPECIALIST

## 2025-07-11 PROCEDURE — 94640 AIRWAY INHALATION TREATMENT: CPT

## 2025-07-11 PROCEDURE — 80053 COMPREHEN METABOLIC PANEL: CPT | Performed by: NURSE PRACTITIONER

## 2025-07-11 PROCEDURE — 93005 ELECTROCARDIOGRAM TRACING: CPT

## 2025-07-11 PROCEDURE — 83735 ASSAY OF MAGNESIUM: CPT | Performed by: NURSE PRACTITIONER

## 2025-07-11 PROCEDURE — 87507 IADNA-DNA/RNA PROBE TQ 12-25: CPT | Performed by: INTERNAL MEDICINE

## 2025-07-11 PROCEDURE — 94799 UNLISTED PULMONARY SVC/PX: CPT

## 2025-07-11 PROCEDURE — 25000242 PHARM REV CODE 250 ALT 637 W/ HCPCS: Performed by: INTERNAL MEDICINE

## 2025-07-11 PROCEDURE — 99900035 HC TECH TIME PER 15 MIN (STAT)

## 2025-07-11 PROCEDURE — 85025 COMPLETE CBC W/AUTO DIFF WBC: CPT | Performed by: NURSE PRACTITIONER

## 2025-07-11 PROCEDURE — 87449 NOS EACH ORGANISM AG IA: CPT | Performed by: INTERNAL MEDICINE

## 2025-07-11 PROCEDURE — 21400001 HC TELEMETRY ROOM

## 2025-07-11 PROCEDURE — 93010 ELECTROCARDIOGRAM REPORT: CPT | Mod: ,,, | Performed by: INTERNAL MEDICINE

## 2025-07-11 PROCEDURE — 99900031 HC PATIENT EDUCATION (STAT)

## 2025-07-11 PROCEDURE — 89055 LEUKOCYTE ASSESSMENT FECAL: CPT | Performed by: INTERNAL MEDICINE

## 2025-07-11 PROCEDURE — 27000221 HC OXYGEN, UP TO 24 HOURS

## 2025-07-11 PROCEDURE — 92611 MOTION FLUOROSCOPY/SWALLOW: CPT

## 2025-07-11 RX ORDER — DIGOXIN 0.25 MG/ML
250 INJECTION INTRAMUSCULAR; INTRAVENOUS ONCE
Status: COMPLETED | OUTPATIENT
Start: 2025-07-11 | End: 2025-07-11

## 2025-07-11 RX ORDER — FUROSEMIDE 10 MG/ML
20 INJECTION INTRAMUSCULAR; INTRAVENOUS EVERY 12 HOURS
Status: ACTIVE | OUTPATIENT
Start: 2025-07-11 | End: 2025-07-12

## 2025-07-11 RX ADMIN — PANTOPRAZOLE SODIUM 40 MG: 40 TABLET, DELAYED RELEASE ORAL at 07:07

## 2025-07-11 RX ADMIN — APIXABAN 5 MG: 5 TABLET, FILM COATED ORAL at 09:07

## 2025-07-11 RX ADMIN — SOTALOL HYDROCHLORIDE 80 MG: 80 TABLET ORAL at 09:07

## 2025-07-11 RX ADMIN — PIPERACILLIN SODIUM AND TAZOBACTAM SODIUM 4.5 G: 4; .5 INJECTION, POWDER, LYOPHILIZED, FOR SOLUTION INTRAVENOUS at 04:07

## 2025-07-11 RX ADMIN — CETIRIZINE HYDROCHLORIDE 10 MG: 10 TABLET, FILM COATED ORAL at 09:07

## 2025-07-11 RX ADMIN — PIPERACILLIN SODIUM AND TAZOBACTAM SODIUM 4.5 G: 4; .5 INJECTION, POWDER, LYOPHILIZED, FOR SOLUTION INTRAVENOUS at 12:07

## 2025-07-11 RX ADMIN — MONTELUKAST 10 MG: 10 TABLET, FILM COATED ORAL at 09:07

## 2025-07-11 RX ADMIN — Medication 1 EACH: at 08:07

## 2025-07-11 RX ADMIN — APIXABAN 5 MG: 5 TABLET, FILM COATED ORAL at 08:07

## 2025-07-11 RX ADMIN — SERTRALINE HYDROCHLORIDE 50 MG: 50 TABLET ORAL at 09:07

## 2025-07-11 RX ADMIN — LEVALBUTEROL HYDROCHLORIDE 1.25 MG: 1.25 SOLUTION RESPIRATORY (INHALATION) at 03:07

## 2025-07-11 RX ADMIN — DILTIAZEM HYDROCHLORIDE 60 MG: 60 TABLET, FILM COATED ORAL at 05:07

## 2025-07-11 RX ADMIN — DILTIAZEM HYDROCHLORIDE 90 MG: 60 TABLET, FILM COATED ORAL at 06:07

## 2025-07-11 RX ADMIN — LEVALBUTEROL HYDROCHLORIDE 1.25 MG: 1.25 SOLUTION RESPIRATORY (INHALATION) at 12:07

## 2025-07-11 RX ADMIN — GUAIFENESIN AND DEXTROMETHORPHAN 10 ML: 100; 10 SYRUP ORAL at 05:07

## 2025-07-11 RX ADMIN — DILTIAZEM HYDROCHLORIDE 60 MG: 60 TABLET, FILM COATED ORAL at 12:07

## 2025-07-11 RX ADMIN — GUAIFENESIN AND DEXTROMETHORPHAN 10 ML: 100; 10 SYRUP ORAL at 12:07

## 2025-07-11 RX ADMIN — ACETAMINOPHEN 1000 MG: 500 TABLET ORAL at 10:07

## 2025-07-11 RX ADMIN — LEVALBUTEROL HYDROCHLORIDE 1.25 MG: 1.25 SOLUTION RESPIRATORY (INHALATION) at 08:07

## 2025-07-11 RX ADMIN — DILTIAZEM HYDROCHLORIDE 90 MG: 60 TABLET, FILM COATED ORAL at 11:07

## 2025-07-11 RX ADMIN — EZETIMIBE 10 MG: 10 TABLET ORAL at 08:07

## 2025-07-11 RX ADMIN — BENZONATATE 100 MG: 100 CAPSULE ORAL at 08:07

## 2025-07-11 RX ADMIN — PIPERACILLIN SODIUM AND TAZOBACTAM SODIUM 4.5 G: 4; .5 INJECTION, POWDER, LYOPHILIZED, FOR SOLUTION INTRAVENOUS at 07:07

## 2025-07-11 RX ADMIN — DIGOXIN 250 MCG: 0.25 INJECTION INTRAMUSCULAR; INTRAVENOUS at 11:07

## 2025-07-11 RX ADMIN — GUAIFENESIN AND DEXTROMETHORPHAN 10 ML: 100; 10 SYRUP ORAL at 11:07

## 2025-07-11 RX ADMIN — GUAIFENESIN AND DEXTROMETHORPHAN 10 ML: 100; 10 SYRUP ORAL at 06:07

## 2025-07-11 RX ADMIN — DOXYCYCLINE HYCLATE 100 MG: 100 TABLET, COATED ORAL at 08:07

## 2025-07-11 RX ADMIN — SOTALOL HYDROCHLORIDE 80 MG: 80 TABLET ORAL at 08:07

## 2025-07-11 RX ADMIN — DOXYCYCLINE HYCLATE 100 MG: 100 TABLET, COATED ORAL at 09:07

## 2025-07-11 NOTE — PROGRESS NOTES
[facility]    Cardiology  Progress Note    Patient Name: Nel Recinos  MRN: [id number]  Admission Date: 7/9/2025  Hospital Length of Stay: 1 days  Code Status: Full Code   Attending Physician: Yousif Keating MD   Primary Care Physician: Minda Siddiqui DO  Expected Discharge Date:   Principal Problem:<principal problem not specified>    Subjective:     Brief HPI/Hospital Course:   Ms. Nel Recinos is a 74 year old, known to Dr. Staton, who presented to [facility] on 7.10.25 with c/o of SOB, dizziness, and fever. She has a history of atrial fibrillation on eliquis, HLD, and anxiety. Upon workup, temp was 103, AST 70, ALT 71, EKG revealed atrial flutter RVR. CT of the chest revealed right lower lobe lung consolidation and bilateral pleural effusions. [facility] is being consulted for atrial flutter.        PMH: atrial fibrillation on eliquis, HLD, and anxiety.  PSH: appendectomy, carpal tunnel surgery, foot and hand surgery, hip replacement, tubal ligation  Family History: Mother-liver disease, Father-heart disease  Social History: Denied tobacco and drug use     Previous Cardiac Diagnostics:   Echocardiogram 7.10.25  Left Ventricle: The left ventricle is normal in size. Normal wall thickness. There is normal systolic function. Ejection fraction is approximately 55%.  Right Ventricle: The right ventricle is normal in size Systolic function is normal. TAPSE is 2.0 cm.  Mitral Valve: There is mild regurgitation.  Tricuspid Valve: There is mild regurgitation.      Review of Systems   Cardiovascular: Negative.    Respiratory: Negative.     All other systems reviewed and are negative.    Objective:     Vital Signs (Most Recent):  Temp: (!) 100.9 °F (38.3 °C) (07/11/25 1116)  Pulse: 83 (07/11/25 1116)  Resp: 18 (07/11/25 1116)  BP: 126/71 (07/11/25 1116)  SpO2: (!) 90 % (07/11/25 1116) Vital Signs (24h Range):  Temp:  [97.7 °F (36.5 °C)-101.1 °F (38.4 °C)] 100.9 °F (38.3 °C)  Pulse:  [73-98] 83  Resp:   "[16-29] 18  SpO2:  [86 %-97 %] 90 %  BP: ()/(58-76) 126/71   Weight: 63.5 kg (140 lb)  Body mass index is 23.3 kg/m².  SpO2: (!) 90 %       Intake/Output Summary (Last 24 hours) at 7/11/2025 1141  Last data filed at 7/10/2025 1604  Gross per 24 hour   Intake --   Output 2 ml   Net -2 ml     Lines/Drains/Airways       Peripheral Intravenous Line  Duration             Peripheral IV 07/09/25 2218 18 G Anterior;Right Forearm 1 day    Peripheral IV 07/10/25 0021 18 G Right Antecubital 1 day                    Significant Labs:   Chemistries:   Recent Labs   Lab 07/09/25  2218 07/10/25  0541 07/11/25  0620   * 138 136   K 3.5 3.1* 3.8    105 106   CO2 20* 21* 22*   BUN 12.8 9.3* 12.2   CREATININE 0.73 0.72 0.69   CALCIUM 9.5 8.6 8.8   PROT 7.6 6.6 6.5   BILITOT 1.0 0.7 0.7   ALKPHOS 79 66 69   ALT 71* 58* 60*   AST 70* 48* 53*   MG  --  1.80 2.00   TROPONINI <0.010  --   --         CBC/Anemia Labs: Coags:    Recent Labs   Lab 07/09/25  2218 07/10/25  0541 07/11/25  0620   WBC 10.91 9.71 8.80   HGB 11.0* 10.5* 9.8*   HCT 32.4* 31.7* 29.5*    181 221   MCV 89.0 89.3 89.9   RDW 13.4 13.7 14.3    No results for input(s): "PT", "INR", "APTT" in the last 168 hours.     Telemetry:  AF    Physical Exam  Vitals reviewed.   Constitutional:       Appearance: Normal appearance.   HENT:      Mouth/Throat:      Mouth: Mucous membranes are moist.   Cardiovascular:      Rate and Rhythm: Normal rate. Rhythm irregular.      Pulses: Normal pulses.      Heart sounds: Normal heart sounds.   Pulmonary:      Effort: Pulmonary effort is normal.      Breath sounds: Normal breath sounds.   Abdominal:      General: Bowel sounds are normal.      Palpations: Abdomen is soft.   Musculoskeletal:         General: Normal range of motion.   Skin:     General: Skin is warm and dry.      Capillary Refill: Capillary refill takes less than 2 seconds.   Neurological:      General: No focal deficit present.      Mental Status: She is " alert and oriented to person, place, and time.         Current Schedule Inpatient Medications:   apixaban  5 mg Oral BID    cetirizine  10 mg Oral QHS    dextromethorphan-guaiFENesin  mg/5 ml  10 mL Oral Q6H    diltiaZEM  90 mg Oral Q6H    doxycycline  100 mg Oral Q12H    ezetimibe  10 mg Oral Daily    Lactobacillus rhamnosus GG  1 packet Oral Daily    levalbuterol  1.25 mg Nebulization Q8H    montelukast  10 mg Oral QHS    pantoprazole  40 mg Oral QAM    piperacillin-tazobactam (Zosyn) IV (PEDS and ADULTS) (extended infusion is not appropriate)  4.5 g Intravenous Q8H    sertraline  50 mg Oral QHS    sotaloL  80 mg Oral BID     Continuous Infusions:   dilTIAZem  0-15 mg/hr Intravenous Continuous   Stopped at 07/10/25 1305       Assessment:     PAF  Chads-Vasc 2  Pneumonia  UTI  HLD  Asthma  Anxiety    Plan:   Digoxin 250 mcg IV x 1  Increase cardizem to 90 mg qid  Continue sotolol 80 mg po bid  Continue eliquis 5 mg po bid  Keep K>4 and Mag>2  Labs and EKG in am: CBC, CMP and Mag    APOLINAR Shah  Cardiology  [facility]

## 2025-07-11 NOTE — PT/OT/SLP PROGRESS
[facility] Speech Language Pathology Department    Patient Name:  Nel Recinos   MRN:  [id number]    SLP orders received, chart reviewed, and nursing consulted.  Pt admitted with RLL PNA after presenting with SOB.  Difficulty swallowing without choking and recent aspiration events reported to MD.  SLP to complete MBS study for comprehensive assessment of swallow function.  POC discussed with pt who verbalized agreement and understanding.    Speech Start Time:  0925  Speech Stop Time:  0935     Speech Total Time (min):  10 min  Non-billable patient care time:  10 minutes    07/11/2025

## 2025-07-12 LAB
ANION GAP SERPL CALC-SCNC: 10 MEQ/L
BUN SERPL-MCNC: 13.4 MG/DL (ref 9.8–20.1)
CALCIUM SERPL-MCNC: 8.8 MG/DL (ref 8.4–10.2)
CHLORIDE SERPL-SCNC: 104 MMOL/L (ref 98–107)
CO2 SERPL-SCNC: 22 MMOL/L (ref 23–31)
CREAT SERPL-MCNC: 0.69 MG/DL (ref 0.55–1.02)
CREAT/UREA NIT SERPL: 19
CRP SERPL-MCNC: 223.4 MG/L
FECAL LEUKOCYTE (OHS): POSITIVE
GFR SERPLBLD CREATININE-BSD FMLA CKD-EPI: >60 ML/MIN/1.73/M2
GLUCOSE SERPL-MCNC: 109 MG/DL (ref 82–115)
MAGNESIUM SERPL-MCNC: 2 MG/DL (ref 1.6–2.6)
OHS QRS DURATION: 74 MS
OHS QTC CALCULATION: 509 MS
POTASSIUM SERPL-SCNC: 3.5 MMOL/L (ref 3.5–5.1)
SODIUM SERPL-SCNC: 136 MMOL/L (ref 136–145)

## 2025-07-12 PROCEDURE — 21400001 HC TELEMETRY ROOM

## 2025-07-12 PROCEDURE — 80048 BASIC METABOLIC PNL TOTAL CA: CPT | Performed by: INTERNAL MEDICINE

## 2025-07-12 PROCEDURE — 27000221 HC OXYGEN, UP TO 24 HOURS

## 2025-07-12 PROCEDURE — 94640 AIRWAY INHALATION TREATMENT: CPT

## 2025-07-12 PROCEDURE — 99900035 HC TECH TIME PER 15 MIN (STAT)

## 2025-07-12 PROCEDURE — 83735 ASSAY OF MAGNESIUM: CPT | Performed by: NURSE PRACTITIONER

## 2025-07-12 PROCEDURE — 63600175 PHARM REV CODE 636 W HCPCS: Performed by: INTERNAL MEDICINE

## 2025-07-12 PROCEDURE — 99900031 HC PATIENT EDUCATION (STAT)

## 2025-07-12 PROCEDURE — 25000003 PHARM REV CODE 250: Performed by: SPECIALIST

## 2025-07-12 PROCEDURE — 86140 C-REACTIVE PROTEIN: CPT | Performed by: INTERNAL MEDICINE

## 2025-07-12 PROCEDURE — 36415 COLL VENOUS BLD VENIPUNCTURE: CPT | Performed by: INTERNAL MEDICINE

## 2025-07-12 PROCEDURE — 25000003 PHARM REV CODE 250: Performed by: INTERNAL MEDICINE

## 2025-07-12 PROCEDURE — 93010 ELECTROCARDIOGRAM REPORT: CPT | Mod: ,,, | Performed by: INTERNAL MEDICINE

## 2025-07-12 PROCEDURE — 94760 N-INVAS EAR/PLS OXIMETRY 1: CPT

## 2025-07-12 PROCEDURE — 25000242 PHARM REV CODE 250 ALT 637 W/ HCPCS: Performed by: INTERNAL MEDICINE

## 2025-07-12 PROCEDURE — 93005 ELECTROCARDIOGRAM TRACING: CPT

## 2025-07-12 RX ORDER — PREDNISONE 20 MG/1
40 TABLET ORAL DAILY
Status: DISCONTINUED | OUTPATIENT
Start: 2025-07-12 | End: 2025-07-14 | Stop reason: HOSPADM

## 2025-07-12 RX ADMIN — GUAIFENESIN AND DEXTROMETHORPHAN 10 ML: 100; 10 SYRUP ORAL at 06:07

## 2025-07-12 RX ADMIN — LEVALBUTEROL HYDROCHLORIDE 1.25 MG: 1.25 SOLUTION RESPIRATORY (INHALATION) at 12:07

## 2025-07-12 RX ADMIN — SOTALOL HYDROCHLORIDE 80 MG: 80 TABLET ORAL at 10:07

## 2025-07-12 RX ADMIN — GUAIFENESIN AND DEXTROMETHORPHAN 10 ML: 100; 10 SYRUP ORAL at 05:07

## 2025-07-12 RX ADMIN — GUAIFENESIN AND DEXTROMETHORPHAN 10 ML: 100; 10 SYRUP ORAL at 12:07

## 2025-07-12 RX ADMIN — EZETIMIBE 10 MG: 10 TABLET ORAL at 10:07

## 2025-07-12 RX ADMIN — PIPERACILLIN SODIUM AND TAZOBACTAM SODIUM 4.5 G: 4; .5 INJECTION, POWDER, LYOPHILIZED, FOR SOLUTION INTRAVENOUS at 10:07

## 2025-07-12 RX ADMIN — DILTIAZEM HYDROCHLORIDE 90 MG: 60 TABLET, FILM COATED ORAL at 12:07

## 2025-07-12 RX ADMIN — PANTOPRAZOLE SODIUM 40 MG: 40 TABLET, DELAYED RELEASE ORAL at 06:07

## 2025-07-12 RX ADMIN — APIXABAN 5 MG: 5 TABLET, FILM COATED ORAL at 09:07

## 2025-07-12 RX ADMIN — DOXYCYCLINE HYCLATE 100 MG: 100 TABLET, COATED ORAL at 09:07

## 2025-07-12 RX ADMIN — PIPERACILLIN SODIUM AND TAZOBACTAM SODIUM 4.5 G: 4; .5 INJECTION, POWDER, LYOPHILIZED, FOR SOLUTION INTRAVENOUS at 12:07

## 2025-07-12 RX ADMIN — MONTELUKAST 10 MG: 10 TABLET, FILM COATED ORAL at 09:07

## 2025-07-12 RX ADMIN — DILTIAZEM HYDROCHLORIDE 90 MG: 60 TABLET, FILM COATED ORAL at 06:07

## 2025-07-12 RX ADMIN — Medication 1 EACH: at 10:07

## 2025-07-12 RX ADMIN — PREDNISONE 40 MG: 20 TABLET ORAL at 05:07

## 2025-07-12 RX ADMIN — PIPERACILLIN SODIUM AND TAZOBACTAM SODIUM 4.5 G: 4; .5 INJECTION, POWDER, LYOPHILIZED, FOR SOLUTION INTRAVENOUS at 05:07

## 2025-07-12 RX ADMIN — CETIRIZINE HYDROCHLORIDE 10 MG: 10 TABLET, FILM COATED ORAL at 09:07

## 2025-07-12 RX ADMIN — DILTIAZEM HYDROCHLORIDE 90 MG: 60 TABLET, FILM COATED ORAL at 05:07

## 2025-07-12 RX ADMIN — SERTRALINE HYDROCHLORIDE 50 MG: 50 TABLET ORAL at 09:07

## 2025-07-12 RX ADMIN — APIXABAN 5 MG: 5 TABLET, FILM COATED ORAL at 10:07

## 2025-07-12 RX ADMIN — LEVALBUTEROL HYDROCHLORIDE 1.25 MG: 1.25 SOLUTION RESPIRATORY (INHALATION) at 08:07

## 2025-07-12 RX ADMIN — DOXYCYCLINE HYCLATE 100 MG: 100 TABLET, COATED ORAL at 10:07

## 2025-07-12 RX ADMIN — SOTALOL HYDROCHLORIDE 80 MG: 80 TABLET ORAL at 09:07

## 2025-07-12 NOTE — CONSULTS
[facility] - 6th Floor Medical Telemetry  Pulmonary Critical Care Note    Patient Name: Nel Recinos  MRN: [id number]  Admission Date: 7/9/2025  Hospital Length of Stay: 2 days  Code Status: Full Code  Attending Provider: Yousif Keating MD  Primary Care Provider: Minda Siddiqui DO     Subjective:     HPI:   This is a 74-year-old female with a past medical history of atrial fibrillation on Eliquis, anxiety, GERD, and hyperlipidemia who presented to the emergency department on 07/09/2025 with fever, weakness, cough and congestion.  She had been feeling bad for a few days and also reported 1 episode of nausea and vomiting a few days prior.  She was febrile on admission, chest x-ray with right lower lobe opacities and a subsequent CT of the chest revealed a right lower lobe large dense consolidation with small bilateral pleural effusions.  She was also in atrial fibrillation with RVR on arrival and was given Cardizem bolus and started on Cardizem, did, and sotalol.  Blood cultures are negative thus far, respiratory viral panel was negative.  Speech evaluation with MBS was negative for aspiration.    Hospital Course/Significant events:  As above    24 Hour Interval History:  She is oxygenating well on 2L NC. Repeat CXR today with continued bibasilar opacities, worsening on the left side. She is now afebrile, fever curve trending down.     Past Medical History:   Diagnosis Date    A-fib     Anxiety disorder, unspecified     Arthritis     Current use of long term anticoagulation 05/13/2025    GERD (gastroesophageal reflux disease)     High cholesterol     HLD (hyperlipidemia)     Osteoarthritis        Past Surgical History:   Procedure Laterality Date    APPENDECTOMY  1979    CARPAL TUNNEL RELEASE Left     FOOT SURGERY      HAND SURGERY      hysterectomy      HYSTERECTOMY  2013    JOINT REPLACEMENT  2017    Right Thumb    ROBOTIC ARTHROPLASTY,HIP,TOTAL,POSTERIOR APPROACH Left 11/27/2023    Procedure:  ROBOTIC ARTHROPLASTY, HIP, TOTAL, POSTERIOR APPROACH;  Surgeon: Isidoro Waters MD;  Location: [facility] OR;  Service: Orthopedics;  Laterality: Left;    TRANSFORAMINAL EPIDURAL INJECTION OF STEROID Bilateral 01/13/2025    Procedure: INJECTION, STEROID, EPIDURAL, TRANSFORAMINAL APPROACH     /////TFESI L4-L5 B/L;  Surgeon: Linsey Reed MD;  Location: [address] [facility] OR;  Service: Pain Management;  Laterality: Bilateral;  TFESI L4-L5 B/L    TUBAL LIGATION  1982       Social History[1]        Current Outpatient Medications   Medication Instructions    ELIQUIS 5 mg, 2 times daily    ezetimibe (ZETIA) 10 mg, Oral, Daily    fluticasone propionate (FLONASE) 50 mcg/actuation nasal spray USE 2 SPRAYS IN EACH NOSTRIL EVERY MORNING    melatonin (MELATIN) 5 mg Nightly    montelukast (SINGULAIR) 10 mg, Oral, Nightly    pantoprazole (PROTONIX) 40 MG tablet 1 tablet, Every morning    sertraline (ZOLOFT) 50 mg    sotaloL (BETAPACE) 80 mg, Oral, 2 times daily       Review of patient's allergies indicates:   Allergen Reactions    Tomato     Weed pollen         Current Inpatient Medications   apixaban  5 mg Oral BID    cetirizine  10 mg Oral QHS    dextromethorphan-guaiFENesin  mg/5 ml  10 mL Oral Q6H    diltiaZEM  90 mg Oral Q6H    doxycycline  100 mg Oral Q12H    ezetimibe  10 mg Oral Daily    Lactobacillus rhamnosus GG  1 packet Oral Daily    montelukast  10 mg Oral QHS    pantoprazole  40 mg Oral QAM    piperacillin-tazobactam (Zosyn) IV (PEDS and ADULTS) (extended infusion is not appropriate)  4.5 g Intravenous Q8H    sertraline  50 mg Oral QHS    sotaloL  80 mg Oral BID       Current Intravenous Infusions        Review of Systems   Constitutional:  Positive for fever and malaise/fatigue.   Respiratory:  Positive for cough and shortness of breath.           Objective:     No intake or output data in the 24 hours ending 07/12/25 1141      Vital Signs (Most Recent):  Temp: 98.2 °F (36.8 °C) (07/12/25 0815)  Pulse: 70 (07/12/25  "1016)  Resp: 17 (07/12/25 0847)  BP: 108/64 (07/12/25 1016)  SpO2: (S) (!) 88 % (sats dropped to lower 80's when patient ambulated to bathroom, with increased sob.) (07/12/25 0847)  Body mass index is 23.3 kg/m².  Weight: 63.5 kg (140 lb) Vital Signs (24h Range):  Temp:  [97.7 °F (36.5 °C)-99.5 °F (37.5 °C)] 98.2 °F (36.8 °C)  Pulse:  [59-95] 70  Resp:  [17-24] 17  SpO2:  [88 %-94 %] 88 %  BP: ()/(62-67) 108/64     Physical Exam  Vitals reviewed.   Constitutional:       Appearance: Normal appearance.   HENT:      Head: Normocephalic and atraumatic.   Cardiovascular:      Rate and Rhythm: Normal rate.      Heart sounds: Normal heart sounds.   Pulmonary:      Effort: Pulmonary effort is normal.      Comments: Few crackles in the right base  Abdominal:      Palpations: Abdomen is soft.   Musculoskeletal:      Cervical back: Neck supple.   Neurological:      General: No focal deficit present.      Mental Status: She is alert and oriented to person, place, and time.           Lines/Drains/Airways       Peripheral Intravenous Line  Duration             Peripheral IV 07/09/25 2218 18 G Anterior;Right Forearm 2 days    Peripheral IV 07/10/25 0021 18 G Right Antecubital 2 days                    Significant Labs:    Lab Results   Component Value Date    WBC 8.80 07/11/2025    HGB 9.8 (L) 07/11/2025    HCT 29.5 (L) 07/11/2025    MCV 89.9 07/11/2025     07/11/2025           BMP  Lab Results   Component Value Date     07/12/2025    K 3.5 07/12/2025    CO2 22 (L) 07/12/2025    BUN 13.4 07/12/2025    CREATININE 0.69 07/12/2025    CALCIUM 8.8 07/12/2025    AGAP 10.0 07/12/2025    EGFRNONAA >60 02/09/2021         ABG  No results for input(s): "PH", "PO2", "PCO2", "HCO3", "POCBASEDEF" in the last 168 hours.    Mechanical Ventilation Support:  Oxygen Concentration (%): 92 (07/10/25 2000)      Significant Imaging:  I have reviewed the pertinent imaging within the past 24 hours.        Assessment/Plan: "     Assessment  Right lower lobe pneumonia and associated bilateral effusions  Atrial fibrillation, now rate controlled  Anxiety  Gerd      Plan  -Continue broad spectrum antibiotics and following cultures  -She may have had an aspiration event last week when she had one episode of vomiting  -Suspect atrial fibrillation is also contributing to a bit of volume overload, continue rate control, may need to consider lasix based on CXR, possible plans for cardioversion Monday   -MD to follow for any further recs          APOLINAR Theodore  Pulmonary Critical Care Medicine  [facility] - 6th Floor Medical Telemetry  DOS: 07/12/2025          [1]   Social History  Socioeconomic History    Marital status:    Tobacco Use    Smoking status: Never    Smokeless tobacco: Never   Substance and Sexual Activity    Alcohol use: Yes     Alcohol/week: 1.0 standard drink of alcohol     Types: 1 Glasses of wine per week     Comment: Socially    Drug use: Never    Sexual activity: Not Currently     Partners: Male     Birth control/protection: See Surgical Hx     Social Drivers of Health     Food Insecurity: No Food Insecurity (7/10/2025)    Hunger Vital Sign     Worried About Running Out of Food in the Last Year: Never true     Ran Out of Food in the Last Year: Never true   Transportation Needs: No Transportation Needs (7/10/2025)    PRAPARE - Transportation     Lack of Transportation (Medical): No     Lack of Transportation (Non-Medical): No   Housing Stability: Low Risk  (7/10/2025)    Housing Stability Vital Sign     Unable to Pay for Housing in the Last Year: No     Homeless in the Last Year: No

## 2025-07-12 NOTE — PROGRESS NOTES
[facility] Hospital Medicine Progress Note        Chief Complaint: Inpatient Follow-up for SOB    HPI:     74 y.o. female with PMHx of A-fib on Eliquis, HLD, Anxiety disorder,  Arthritis, GERD presented to [facility] on 7/9/2025 with a primary complaint of  fever , generalized weakness, cough, sputum. Sinus and chest congestion, SOB and feeling unwell for two days. Symptoms are persistent since onset. On further questioning Pt reported associated nausea and diarrhea. Also reports occasional difficulty swallowing  but denies any recent chocking or aspiration event.      Vitals on arrival , Temp 99.7, /91, , RR 20, SpO2 98%, Labs showed WBC 10.9, Hgb 11, Plt 193, Na 134, K 3.5, , Cr 0.7. mildly elevated AST/ALT, Troponin neg, Lactic 1.3, TSH 1.9. EKG with A.fib with , CXR with evidence of Rt infrahilar lower lung lobe opacities. Subsequent CT chest showed Rt lower lung lobe large dense consolidation with mike dependent small pleura effusions. Blood cultures were drawn. Pt was given Diltiazem 5 mg bolus, Rocephin and Azithromycin in the ED. Pt was then admitted to  service for further management.     Antibiotic modified to Zosyn and Doxycyline. SLP consulted to assess risk of aspiration. Cardiology was consulted for A.fib with RVR.      Interval Hx:   Fever tended down. Tmax 100.9 earlier today   Still c/o cough and diarrhea   GI panel neg   Remains in A.fib. Rate is controlled   Noted on O2 via NC 3 to 4L/min   Strongly encouraged  IS      Case was discussed with patient's nurse and  on the floor.    Objective/physical exam:  General: In no acute distress, afebrile  Chest: Crackles and diminished breath sound Rt lower lobe   Heart: Irregular rhythm, +S1, S2, no appreciable murmur  Abdomen: Soft, nontender, BS +  MSK: Warm, no lower extremity edema, no clubbing or cyanosis  Neurologic: Alert and oriented x4      VITAL SIGNS: 24 HRS MIN & MAX LAST   Temp   Min: 98.1 °F (36.7 °C)  Max: 100.9 °F (38.3 °C) 99.5 °F (37.5 °C)   BP  Min: 98/64  Max: 126/71 98/64   Pulse  Min: 73  Max: 92  83   Resp  Min: 16  Max: 24 (!) 24   SpO2  Min: 89 %  Max: 97 % (!) 89 %     I have reviewed the following labs:  Recent Labs   Lab 07/09/25  2218 07/10/25  0541 07/11/25  0620   WBC 10.91 9.71 8.80   RBC 3.64* 3.55* 3.28*   HGB 11.0* 10.5* 9.8*   HCT 32.4* 31.7* 29.5*   MCV 89.0 89.3 89.9   MCH 30.2 29.6 29.9   MCHC 34.0 33.1 33.2   RDW 13.4 13.7 14.3    181 221   MPV 9.8 9.9 10.3     Recent Labs   Lab 07/09/25  2218 07/10/25  0541 07/11/25  0620   * 138 136   K 3.5 3.1* 3.8    105 106   CO2 20* 21* 22*   BUN 12.8 9.3* 12.2   CREATININE 0.73 0.72 0.69   * 114 102   CALCIUM 9.5 8.6 8.8   MG  --  1.80 2.00   ALBUMIN 3.0* 2.5* 2.2*   PROT 7.6 6.6 6.5   ALKPHOS 79 66 69   ALT 71* 58* 60*   AST 70* 48* 53*   BILITOT 1.0 0.7 0.7     Microbiology Results (last 7 days)       Procedure Component Value Units Date/Time    Stool Culture [[id number]] Collected: 07/11/25 1737    Order Status: Sent Specimen: Stool Updated: 07/11/25 1819    Clostridium Diff Toxin, A & B, EIA [[id number]] Collected: 07/11/25 1737    Order Status: Sent Specimen: Stool Updated: 07/11/25 1819    Respiratory Culture [[id number]] Collected: 07/11/25 1205    Order Status: Sent Specimen: Sputum, Expectorated Updated: 07/11/25 1207    Blood culture #1 **CANNOT BE ORDERED STAT** [[id number]]  (Normal) Collected: 07/09/25 2222    Order Status: Completed Specimen: Blood Updated: 07/11/25 0005     Blood Culture No Growth At 24 Hours    Blood culture #2 **CANNOT BE ORDERED STAT** [[id number]]  (Normal) Collected: 07/09/25 2222    Order Status: Completed Specimen: Blood Updated: 07/11/25 0005     Blood Culture No Growth At 24 Hours             See below for Radiology    Assessment/Plan:    Community Acquired pneumonia involving RLL due to unspecified organism, POA  Acute hypoxic resp failure requiring supp  oxygen due to above, POA  Normocytic Anemia/ Anemia of chronic disease- mild-POA  Hypokalemia, mild -POA- corrected   Elevated LFT/Transaminitis , POA  Acute Gastroenteritis, POA  Paroxysmal atrial fibrillation with RVR, POA  Intermittent dysphagia for solid, per history -POA     Hx- HLD, Anxiety, OA, GERD     Plan-   Blood cultures x2- NG 24h  Going for MBS  study today   Continue antibiotic  Zosyn and Doxycyline   Resume home Sotalol   Cardiology is assisting with PAF with RVR  Diltiazem added   Correct and replete electrolytes with goal potassium >4, Mg>2  Monitor LFTs  GI PCR neg    Supportive treatment with antitussive, expectorant   Home meds are reviewed and resumed as appropriate.  PT/OT consult        VTE prophylaxis: Home Eliquis    Patient condition:  Fair    Anticipated discharge and Disposition:     TBD     All diagnosis and differential diagnosis have been reviewed; assessment and plan has been documented; I have personally reviewed the labs and test results that are presently available; I have reviewed the patients medication list; I have reviewed the consulting providers response and recommendations. I have reviewed or attempted to review medical records based upon their availability    All of the patient's questions have been  addressed and answered. Patient's is agreeable to the above stated plan. I will continue to monitor closely and make adjustments to medical management as needed.    Portions of this note dictated using EMR integrated voice recognition software, and may be subject to voice recognition errors not corrected at proofreading. Please contact writer for clarification if needed.   _____________________________________________________________________    Malnutrition Status:  Nutrition consulted. Most recent weight and BMI monitored-     Measurements:  Wt Readings from Last 1 Encounters:   07/09/25 63.5 kg (140 lb)   Body mass index is 23.3 kg/m².    Patient has been screened and  assessed by RD.    Malnutrition Type:  Context:    Level:      Malnutrition Characteristic Summary:       Interventions/Recommendations (treatment strategy):        Scheduled Med:   apixaban  5 mg Oral BID    cetirizine  10 mg Oral QHS    dextromethorphan-guaiFENesin  mg/5 ml  10 mL Oral Q6H    diltiaZEM  90 mg Oral Q6H    doxycycline  100 mg Oral Q12H    ezetimibe  10 mg Oral Daily    furosemide (LASIX) injection  20 mg Intravenous Q12H    Lactobacillus rhamnosus GG  1 packet Oral Daily    levalbuterol  1.25 mg Nebulization Q8H    montelukast  10 mg Oral QHS    pantoprazole  40 mg Oral QAM    piperacillin-tazobactam (Zosyn) IV (PEDS and ADULTS) (extended infusion is not appropriate)  4.5 g Intravenous Q8H    sertraline  50 mg Oral QHS    sotaloL  80 mg Oral BID      Continuous Infusions:   dilTIAZem  0-15 mg/hr Intravenous Continuous   Stopped at 07/10/25 1305      PRN Meds:    Current Facility-Administered Medications:     acetaminophen, 1,000 mg, Oral, Q6H PRN    aluminum-magnesium hydroxide-simethicone, 30 mL, Oral, QID PRN    benzonatate, 100 mg, Oral, TID PRN    bisacodyL, 10 mg, Rectal, Daily PRN    dextrose 50%, 12.5 g, Intravenous, PRN    dextrose 50%, 25 g, Intravenous, PRN    glucagon (human recombinant), 1 mg, Intramuscular, PRN    glucose, 16 g, Oral, PRN    glucose, 24 g, Oral, PRN    levalbuterol, 1.25 mg, Nebulization, Q4H PRN    loperamide, 4 mg, Oral, QID PRN    melatonin, 6 mg, Oral, Nightly PRN    naloxone, 0.02 mg, Intravenous, PRN    ondansetron, 4 mg, Intravenous, Q4H PRN    prochlorperazine, 5 mg, Intravenous, Q6H PRN    senna-docusate, 1 tablet, Oral, BID PRN    sodium chloride 0.9%, 10 mL, Intravenous, PRN     Radiology:  I have personally reviewed the following imaging and agree with the radiologist.     Fl Modified Barium Swallow Speech  See procedure notes from Speech Pathologist.    This procedure was auto-finalized.      Yousif Keating MD  Department of Hospital Medicine    [facility]   07/11/2025

## 2025-07-12 NOTE — PROGRESS NOTES
[facility]    Cardiology  Progress Note    Patient Name: Nel Recinos  MRN: [id number]  Admission Date: 7/9/2025  Hospital Length of Stay: 2 days  Code Status: Full Code   Attending Physician: Yousif Keating MD   Primary Care Physician: Minda Siddiqui DO  Expected Discharge Date:   Principal Problem:<principal problem not specified>    Subjective:     Brief HPI/Hospital Course:   Ms. Nel Recinos is a 74 year old, known to Dr. Staton and remotely to Dr. Staton, who presented to [facility] on 7.10.25 with c/o of SOB, dizziness, and fever. She has a history of atrial fibrillation on eliquis, HLD, and anxiety. Upon workup, temp was 103, AST 70, ALT 71, EKG revealed atrial flutter RVR. CT of the chest revealed right lower lobe lung consolidation and bilateral pleural effusions. [facility] is being consulted for atrial flutter.     Hospital Course:  7.12.25: On multiple meds for control of afib. HR now controlled.   She still feels poorly, dense consolidation RLL on CT.  Some hemoptysis and nosebleed, also diarrhea after each meal        PMH: persistent atrial fibrillation on eliquis, HLD, and anxiety.  PSH: appendectomy, carpal tunnel surgery, foot and hand surgery, hip replacement, tubal ligation  Family History: Mother-liver disease, Father-heart disease  Social History: Denied tobacco and drug use     Previous Cardiac Diagnostics:   Echocardiogram 7.10.25  Left Ventricle: The left ventricle is normal in size. Normal wall thickness. There is normal systolic function. Ejection fraction is approximately 55%.  Right Ventricle: The right ventricle is normal in size Systolic function is normal. TAPSE is 2.0 cm.  Mitral Valve: There is mild regurgitation.  Tricuspid Valve: There is mild regurgitation.      Review of Systems   Cardiovascular: Negative.    Respiratory: Negative.     All other systems reviewed and are negative.    Objective:     Vital Signs (Most Recent):  Temp: 97.7 °F (36.5 °C)  "(07/12/25 0436)  Pulse: 64 (07/12/25 0436)  Resp: (!) 23 (07/12/25 0020)  BP: 100/62 (07/12/25 0436)  SpO2: (!) 91 % (07/12/25 0436) Vital Signs (24h Range):  Temp:  [97.7 °F (36.5 °C)-100.9 °F (38.3 °C)] 97.7 °F (36.5 °C)  Pulse:  [59-95] 64  Resp:  [16-24] 23  SpO2:  [89 %-95 %] 91 %  BP: ()/(62-71) 100/62   Weight: 63.5 kg (140 lb)  Body mass index is 23.3 kg/m².  SpO2: (!) 91 %     No intake or output data in the 24 hours ending 07/12/25 0731    Lines/Drains/Airways       Peripheral Intravenous Line  Duration             Peripheral IV 07/09/25 2218 18 G Anterior;Right Forearm 2 days    Peripheral IV 07/10/25 0021 18 G Right Antecubital 2 days                    Significant Labs:   Chemistries:   Recent Labs   Lab 07/09/25  2218 07/10/25  0541 07/11/25  0620 07/12/25  0512   * 138 136 136   K 3.5 3.1* 3.8 3.5    105 106 104   CO2 20* 21* 22* 22*   BUN 12.8 9.3* 12.2 13.4   CREATININE 0.73 0.72 0.69 0.69   CALCIUM 9.5 8.6 8.8 8.8   PROT 7.6 6.6 6.5  --    BILITOT 1.0 0.7 0.7  --    ALKPHOS 79 66 69  --    ALT 71* 58* 60*  --    AST 70* 48* 53*  --    MG  --  1.80 2.00 2.00   TROPONINI <0.010  --   --   --         CBC/Anemia Labs: Coags:    Recent Labs   Lab 07/09/25  2218 07/10/25  0541 07/11/25  0620   WBC 10.91 9.71 8.80   HGB 11.0* 10.5* 9.8*   HCT 32.4* 31.7* 29.5*    181 221   MCV 89.0 89.3 89.9   RDW 13.4 13.7 14.3    No results for input(s): "PT", "INR", "APTT" in the last 168 hours.     Telemetry:  AF    Physical Exam  Vitals reviewed.   Constitutional:       Appearance: Normal appearance.   HENT:      Mouth/Throat:      Mouth: Mucous membranes are moist.   Cardiovascular:      Rate and Rhythm: Normal rate. Rhythm irregular.      Pulses: Normal pulses.      Heart sounds: Normal heart sounds.   Pulmonary:      Effort: Pulmonary effort is normal.      Comments: Bronchial BS both bases R>>L  Abdominal:      General: Bowel sounds are normal.      Palpations: Abdomen is soft. "   Musculoskeletal:         General: Normal range of motion.   Skin:     General: Skin is warm and dry.      Capillary Refill: Capillary refill takes less than 2 seconds.   Neurological:      General: No focal deficit present.      Mental Status: She is alert and oriented to person, place, and time.         Current Schedule Inpatient Medications:   apixaban  5 mg Oral BID    cetirizine  10 mg Oral QHS    dextromethorphan-guaiFENesin  mg/5 ml  10 mL Oral Q6H    diltiaZEM  90 mg Oral Q6H    doxycycline  100 mg Oral Q12H    ezetimibe  10 mg Oral Daily    furosemide (LASIX) injection  20 mg Intravenous Q12H    Lactobacillus rhamnosus GG  1 packet Oral Daily    levalbuterol  1.25 mg Nebulization Q8H    montelukast  10 mg Oral QHS    pantoprazole  40 mg Oral QAM    piperacillin-tazobactam (Zosyn) IV (PEDS and ADULTS) (extended infusion is not appropriate)  4.5 g Intravenous Q8H    sertraline  50 mg Oral QHS    sotaloL  80 mg Oral BID     Continuous Infusions:   dilTIAZem  0-15 mg/hr Intravenous Continuous   Stopped at 07/10/25 1305       Assessment:   Persistent AF  --Chads-Vasc 2  Pneumonia  UTI  HLD  Asthma  Anxiety    Plan:   Continue cardizem 90 mg qid  Continue sotolol 80 mg po bid  Continue eliquis 5 mg po bid  Consider DCCV on Monday. NPO after MN Monday.  May delay pending pulmonary evaluation.  Keep K>4 and Mag>2  Labs and EKG in am: CBC, CMP and Mag  Repeat CXR  Consider Pulmonary involvement ? Bronchoscopy?    APOLINAR Oreilly  Cardiology  [facility]

## 2025-07-12 NOTE — PROGRESS NOTES
[facility] Hospital Medicine Progress Note        Chief Complaint: Inpatient Follow-up for SOB/Pneumonia     HPI:   74 y.o. female with PMHx of A-fib on Eliquis, HLD, Anxiety disorder,  Arthritis, GERD presented to [facility] on 7/9/2025 with a primary complaint of  fever , generalized weakness, cough, sputum. Sinus and chest congestion, SOB and feeling unwell for two days. Symptoms are persistent since onset. On further questioning Pt reported associated nausea and diarrhea. Also reports occasional difficulty swallowing  but denies any recent chocking or aspiration event.      Vitals on arrival , Temp 99.7, /91, , RR 20, SpO2 98%, Labs showed WBC 10.9, Hgb 11, Plt 193, Na 134, K 3.5, , Cr 0.7. mildly elevated AST/ALT, Troponin neg, Lactic 1.3, TSH 1.9. EKG with A.fib with , CXR with evidence of Rt infrahilar lower lung lobe opacities. Subsequent CT chest showed Rt lower lung lobe large dense consolidation with mike dependent small pleura effusions. Blood cultures were drawn. Pt was given Diltiazem 5 mg bolus, Rocephin and Azithromycin in the ED. Pt was then admitted to  service for further management.      Antibiotic modified to Zosyn and Doxycyline. SLP consulted to assess risk of aspiration. Cardiology was consulted for A.fib with RVR.      Interval Hx:   Pt reports she is still not feeling well.   Still coughing, Stool is still soft   Asking to see a Pulmonologist   Fever trended down. Tmax 99. Remains on O2 via NC 4L now   .  Add a short course of Steroid     Case was discussed with patient's nurse and  on the floor.    Objective/physical exam:  General: In no acute distress, afebrile  Chest: Crackles and diminished breath sound Rt lower lobe   Heart: Irregular rhythm, +S1, S2, no appreciable murmur  Abdomen: Soft, nontender, BS +  MSK: Warm, no lower extremity edema, no clubbing or cyanosis  Neurologic: Alert and oriented x4       VITAL  SIGNS: 24 HRS MIN & MAX LAST   Temp  Min: 97.7 °F (36.5 °C)  Max: 99.5 °F (37.5 °C) 99.4 °F (37.4 °C)   BP  Min: 92/65  Max: 108/68 100/61   Pulse  Min: 59  Max: 95  71   Resp  Min: 17  Max: 23 18   SpO2  Min: 88 %  Max: 94 % (!) 88 %     I have reviewed the following labs:  Recent Labs   Lab 07/09/25  2218 07/10/25  0541 07/11/25  0620   WBC 10.91 9.71 8.80   RBC 3.64* 3.55* 3.28*   HGB 11.0* 10.5* 9.8*   HCT 32.4* 31.7* 29.5*   MCV 89.0 89.3 89.9   MCH 30.2 29.6 29.9   MCHC 34.0 33.1 33.2   RDW 13.4 13.7 14.3    181 221   MPV 9.8 9.9 10.3     Recent Labs   Lab 07/09/25  2218 07/10/25  0541 07/11/25  0620 07/12/25  0512   * 138 136 136   K 3.5 3.1* 3.8 3.5    105 106 104   CO2 20* 21* 22* 22*   BUN 12.8 9.3* 12.2 13.4   CREATININE 0.73 0.72 0.69 0.69   * 114 102 109   CALCIUM 9.5 8.6 8.8 8.8   MG  --  1.80 2.00 2.00   ALBUMIN 3.0* 2.5* 2.2*  --    PROT 7.6 6.6 6.5  --    ALKPHOS 79 66 69  --    ALT 71* 58* 60*  --    AST 70* 48* 53*  --    BILITOT 1.0 0.7 0.7  --      Microbiology Results (last 7 days)       Procedure Component Value Units Date/Time    Respiratory Culture [[id number]] Collected: 07/11/25 1205    Order Status: Completed Specimen: Sputum, Expectorated Updated: 07/12/25 1211     Respiratory Culture Rare normal respiratory marlene     GRAM STAIN Quality 1+      Rare Gram positive cocci    Stool Culture [[id number]] Collected: 07/11/25 1737    Order Status: Completed Specimen: Stool Updated: 07/12/25 0808    Narrative:      Negative for Salmonella, Shigella, Campylobacter, Vibrio, Aeromonas, Pleisiomonas, or Yersinia.    Blood culture #1 **CANNOT BE ORDERED STAT** [[id number]]  (Normal) Collected: 07/09/25 2222    Order Status: Completed Specimen: Blood Updated: 07/12/25 0011     Blood Culture No Growth At 48 Hours    Blood culture #2 **CANNOT BE ORDERED STAT** [[id number]]  (Normal) Collected: 07/09/25 2222    Order Status: Completed Specimen: Blood Updated: 07/12/25 0011      Blood Culture No Growth At 48 Hours    Clostridium Diff Toxin, A & B, EIA [[id number]] Collected: 07/11/25 1737    Order Status: Sent Specimen: Stool Updated: 07/11/25 1819             See below for Radiology    Assessment/Plan:  Community Acquired pneumonia involving RLL due to unspecified organism, POA  Acute hypoxic resp failure requiring supp oxygen due to above, POA  Normocytic Anemia/ Anemia of chronic disease- mild-POA  Hypokalemia, mild -POA- corrected   Elevated LFT/Transaminitis , POA  Acute Gastroenteritis, POA  Paroxysmal atrial fibrillation with RVR, POA  Intermittent dysphagia for solid, per history -POA     Hx- HLD, Anxiety, OA, GERD     Plan-   Pulmonology consulted   Will add a short course of Prednisone   Continue Zosyn and Doxycyline   Follow up cultures   Cardiology is following for A.fib  Diltiazem dose escalated per Cards   Sotalol continued     Correct and replete electrolytes with goal potassium >4, Mg>2  Monitor LFTs  GI PCR neg    Supportive treatment with antitussive, expectorant , anti emetics and imodium for diarrhea   Home meds are reviewed and resumed as appropriate.  PT/OT consulted         VTE prophylaxis: Home Eliquis     Patient condition:  Fair     Anticipated discharge and Disposition:     TBD       All diagnosis and differential diagnosis have been reviewed; assessment and plan has been documented; I have personally reviewed the labs and test results that are presently available; I have reviewed the patients medication list; I have reviewed the consulting providers response and recommendations. I have reviewed or attempted to review medical records based upon their availability    All of the patient's questions have been  addressed and answered. Patient's is agreeable to the above stated plan. I will continue to monitor closely and make adjustments to medical management as needed.    Portions of this note dictated using EMR integrated voice recognition software, and may be  subject to voice recognition errors not corrected at proofreading. Please contact writer for clarification if needed.   _____________________________________________________________________    Malnutrition Status:  Nutrition consulted. Most recent weight and BMI monitored-     Measurements:  Wt Readings from Last 1 Encounters:   07/09/25 63.5 kg (140 lb)   Body mass index is 23.3 kg/m².    Patient has been screened and assessed by RD.    Malnutrition Type:  Context:    Level:      Malnutrition Characteristic Summary:       Interventions/Recommendations (treatment strategy):        Scheduled Med:   apixaban  5 mg Oral BID    cetirizine  10 mg Oral QHS    dextromethorphan-guaiFENesin  mg/5 ml  10 mL Oral Q6H    diltiaZEM  90 mg Oral Q6H    doxycycline  100 mg Oral Q12H    ezetimibe  10 mg Oral Daily    Lactobacillus rhamnosus GG  1 packet Oral Daily    montelukast  10 mg Oral QHS    pantoprazole  40 mg Oral QAM    piperacillin-tazobactam (Zosyn) IV (PEDS and ADULTS) (extended infusion is not appropriate)  4.5 g Intravenous Q8H    sertraline  50 mg Oral QHS    sotaloL  80 mg Oral BID      Continuous Infusions:     PRN Meds:    Current Facility-Administered Medications:     acetaminophen, 1,000 mg, Oral, Q6H PRN    aluminum-magnesium hydroxide-simethicone, 30 mL, Oral, QID PRN    benzonatate, 100 mg, Oral, TID PRN    bisacodyL, 10 mg, Rectal, Daily PRN    dextrose 50%, 12.5 g, Intravenous, PRN    dextrose 50%, 25 g, Intravenous, PRN    glucagon (human recombinant), 1 mg, Intramuscular, PRN    glucose, 16 g, Oral, PRN    glucose, 24 g, Oral, PRN    levalbuterol, 1.25 mg, Nebulization, Q4H PRN    loperamide, 4 mg, Oral, QID PRN    melatonin, 6 mg, Oral, Nightly PRN    naloxone, 0.02 mg, Intravenous, PRN    ondansetron, 4 mg, Intravenous, Q4H PRN    prochlorperazine, 5 mg, Intravenous, Q6H PRN    senna-docusate, 1 tablet, Oral, BID PRN    sodium chloride 0.9%, 10 mL, Intravenous, PRN         Yousif Keating,  MD  Department of Hospital Medicine   [facility]   07/12/2025

## 2025-07-13 LAB
ALBUMIN SERPL-MCNC: 2.2 G/DL (ref 3.4–4.8)
ALBUMIN/GLOB SERPL: 0.5 RATIO (ref 1.1–2)
ALP SERPL-CCNC: 104 UNIT/L (ref 40–150)
ALT SERPL-CCNC: 98 UNIT/L (ref 0–55)
ANION GAP SERPL CALC-SCNC: 8 MEQ/L
AST SERPL-CCNC: 86 UNIT/L (ref 11–45)
BACTERIA SPEC CULT: NORMAL
BACTERIA STL CULT: ABNORMAL
BASOPHILS # BLD AUTO: 0.02 X10(3)/MCL
BASOPHILS NFR BLD AUTO: 0.2 %
BILIRUB SERPL-MCNC: 0.7 MG/DL
BUN SERPL-MCNC: 11.6 MG/DL (ref 9.8–20.1)
CALCIUM SERPL-MCNC: 9.2 MG/DL (ref 8.4–10.2)
CHLORIDE SERPL-SCNC: 107 MMOL/L (ref 98–107)
CO2 SERPL-SCNC: 24 MMOL/L (ref 23–31)
CREAT SERPL-MCNC: 0.64 MG/DL (ref 0.55–1.02)
CREAT/UREA NIT SERPL: 18
EOSINOPHIL # BLD AUTO: 0 X10(3)/MCL (ref 0–0.9)
EOSINOPHIL NFR BLD AUTO: 0 %
ERYTHROCYTE [DISTWIDTH] IN BLOOD BY AUTOMATED COUNT: 14.1 % (ref 11.5–17)
GFR SERPLBLD CREATININE-BSD FMLA CKD-EPI: >60 ML/MIN/1.73/M2
GLOBULIN SER-MCNC: 4.5 GM/DL (ref 2.4–3.5)
GLUCOSE SERPL-MCNC: 130 MG/DL (ref 82–115)
GRAM STN SPEC: NORMAL
GRAM STN SPEC: NORMAL
HCT VFR BLD AUTO: 30 % (ref 37–47)
HGB BLD-MCNC: 10 G/DL (ref 12–16)
IMM GRANULOCYTES # BLD AUTO: 0.43 X10(3)/MCL (ref 0–0.04)
IMM GRANULOCYTES NFR BLD AUTO: 4.7 %
LYMPHOCYTES # BLD AUTO: 0.64 X10(3)/MCL (ref 0.6–4.6)
LYMPHOCYTES NFR BLD AUTO: 7 %
MAGNESIUM SERPL-MCNC: 2.1 MG/DL (ref 1.6–2.6)
MCH RBC QN AUTO: 29.5 PG (ref 27–31)
MCHC RBC AUTO-ENTMCNC: 33.3 G/DL (ref 33–36)
MCV RBC AUTO: 88.5 FL (ref 80–94)
MONOCYTES # BLD AUTO: 0.28 X10(3)/MCL (ref 0.1–1.3)
MONOCYTES NFR BLD AUTO: 3 %
NEUTROPHILS # BLD AUTO: 7.83 X10(3)/MCL (ref 2.1–9.2)
NEUTROPHILS NFR BLD AUTO: 85.1 %
NRBC BLD AUTO-RTO: 0 %
PHOSPHATE SERPL-MCNC: 3.2 MG/DL (ref 2.3–4.7)
PLATELET # BLD AUTO: 333 X10(3)/MCL (ref 130–400)
PMV BLD AUTO: 9.5 FL (ref 7.4–10.4)
POTASSIUM SERPL-SCNC: 3.4 MMOL/L (ref 3.5–5.1)
PROT SERPL-MCNC: 6.7 GM/DL (ref 5.8–7.6)
RBC # BLD AUTO: 3.39 X10(6)/MCL (ref 4.2–5.4)
SODIUM SERPL-SCNC: 139 MMOL/L (ref 136–145)
WBC # BLD AUTO: 9.2 X10(3)/MCL (ref 4.5–11.5)

## 2025-07-13 PROCEDURE — 83735 ASSAY OF MAGNESIUM: CPT | Performed by: INTERNAL MEDICINE

## 2025-07-13 PROCEDURE — 99900035 HC TECH TIME PER 15 MIN (STAT)

## 2025-07-13 PROCEDURE — 80053 COMPREHEN METABOLIC PANEL: CPT | Performed by: INTERNAL MEDICINE

## 2025-07-13 PROCEDURE — 85025 COMPLETE CBC W/AUTO DIFF WBC: CPT | Performed by: INTERNAL MEDICINE

## 2025-07-13 PROCEDURE — 63600175 PHARM REV CODE 636 W HCPCS: Performed by: INTERNAL MEDICINE

## 2025-07-13 PROCEDURE — 25000003 PHARM REV CODE 250: Performed by: INTERNAL MEDICINE

## 2025-07-13 PROCEDURE — 99900031 HC PATIENT EDUCATION (STAT)

## 2025-07-13 PROCEDURE — 27000221 HC OXYGEN, UP TO 24 HOURS

## 2025-07-13 PROCEDURE — 36415 COLL VENOUS BLD VENIPUNCTURE: CPT | Performed by: INTERNAL MEDICINE

## 2025-07-13 PROCEDURE — 25000003 PHARM REV CODE 250: Performed by: SPECIALIST

## 2025-07-13 PROCEDURE — 84100 ASSAY OF PHOSPHORUS: CPT | Performed by: INTERNAL MEDICINE

## 2025-07-13 PROCEDURE — 11000001 HC ACUTE MED/SURG PRIVATE ROOM

## 2025-07-13 PROCEDURE — 97161 PT EVAL LOW COMPLEX 20 MIN: CPT

## 2025-07-13 RX ORDER — POTASSIUM CHLORIDE 20 MEQ/1
40 TABLET, EXTENDED RELEASE ORAL ONCE
Status: COMPLETED | OUTPATIENT
Start: 2025-07-13 | End: 2025-07-13

## 2025-07-13 RX ORDER — GUAIFENESIN AND DEXTROMETHORPHAN HYDROBROMIDE 10; 100 MG/5ML; MG/5ML
10 SYRUP ORAL EVERY 4 HOURS PRN
Status: DISCONTINUED | OUTPATIENT
Start: 2025-07-13 | End: 2025-07-14 | Stop reason: HOSPADM

## 2025-07-13 RX ORDER — DILTIAZEM HYDROCHLORIDE 180 MG/1
360 CAPSULE, COATED, EXTENDED RELEASE ORAL DAILY
Status: DISCONTINUED | OUTPATIENT
Start: 2025-07-13 | End: 2025-07-14

## 2025-07-13 RX ADMIN — PIPERACILLIN SODIUM AND TAZOBACTAM SODIUM 4.5 G: 4; .5 INJECTION, POWDER, LYOPHILIZED, FOR SOLUTION INTRAVENOUS at 04:07

## 2025-07-13 RX ADMIN — DOXYCYCLINE HYCLATE 100 MG: 100 TABLET, COATED ORAL at 08:07

## 2025-07-13 RX ADMIN — PREDNISONE 40 MG: 20 TABLET ORAL at 09:07

## 2025-07-13 RX ADMIN — MONTELUKAST 10 MG: 10 TABLET, FILM COATED ORAL at 08:07

## 2025-07-13 RX ADMIN — EZETIMIBE 10 MG: 10 TABLET ORAL at 09:07

## 2025-07-13 RX ADMIN — SOTALOL HYDROCHLORIDE 80 MG: 80 TABLET ORAL at 09:07

## 2025-07-13 RX ADMIN — GUAIFENESIN AND DEXTROMETHORPHAN 10 ML: 100; 10 SYRUP ORAL at 12:07

## 2025-07-13 RX ADMIN — POTASSIUM CHLORIDE 40 MEQ: 1500 TABLET, EXTENDED RELEASE ORAL at 10:07

## 2025-07-13 RX ADMIN — DILTIAZEM HYDROCHLORIDE 90 MG: 60 TABLET, FILM COATED ORAL at 07:07

## 2025-07-13 RX ADMIN — APIXABAN 5 MG: 5 TABLET, FILM COATED ORAL at 08:07

## 2025-07-13 RX ADMIN — PANTOPRAZOLE SODIUM 40 MG: 40 TABLET, DELAYED RELEASE ORAL at 07:07

## 2025-07-13 RX ADMIN — SOTALOL HYDROCHLORIDE 80 MG: 80 TABLET ORAL at 08:07

## 2025-07-13 RX ADMIN — DILTIAZEM HYDROCHLORIDE 360 MG: 180 CAPSULE, COATED, EXTENDED RELEASE ORAL at 10:07

## 2025-07-13 RX ADMIN — Medication 1 EACH: at 09:07

## 2025-07-13 RX ADMIN — DOXYCYCLINE HYCLATE 100 MG: 100 TABLET, COATED ORAL at 09:07

## 2025-07-13 RX ADMIN — SERTRALINE HYDROCHLORIDE 50 MG: 50 TABLET ORAL at 08:07

## 2025-07-13 RX ADMIN — GUAIFENESIN AND DEXTROMETHORPHAN 10 ML: 100; 10 SYRUP ORAL at 07:07

## 2025-07-13 RX ADMIN — CETIRIZINE HYDROCHLORIDE 10 MG: 10 TABLET, FILM COATED ORAL at 08:07

## 2025-07-13 RX ADMIN — DILTIAZEM HYDROCHLORIDE 90 MG: 60 TABLET, FILM COATED ORAL at 12:07

## 2025-07-13 RX ADMIN — PIPERACILLIN SODIUM AND TAZOBACTAM SODIUM 4.5 G: 4; .5 INJECTION, POWDER, LYOPHILIZED, FOR SOLUTION INTRAVENOUS at 09:07

## 2025-07-13 RX ADMIN — PIPERACILLIN SODIUM AND TAZOBACTAM SODIUM 4.5 G: 4; .5 INJECTION, POWDER, LYOPHILIZED, FOR SOLUTION INTRAVENOUS at 12:07

## 2025-07-13 RX ADMIN — APIXABAN 5 MG: 5 TABLET, FILM COATED ORAL at 09:07

## 2025-07-13 NOTE — PROGRESS NOTES
[facility]    Cardiology  Progress Note    Patient Name: Nel Recinos  MRN: [id number]  Admission Date: 7/9/2025  Hospital Length of Stay: 3 days  Code Status: Full Code   Attending Physician: Yousif Keating MD   Primary Care Physician: Minda Siddiqui DO  Expected Discharge Date:   Principal Problem:<principal problem not specified>    Subjective:     Brief HPI/Hospital Course:   Ms. Nel Recinos is a 74 year old, known to Dr. Staton and remotely to Dr. Staton, who presented to [facility] on 7.10.25 with c/o of SOB, dizziness, and fever. She has a history of atrial fibrillation on eliquis, HLD, and anxiety. Upon workup, temp was 103, AST 70, ALT 71, EKG revealed atrial flutter RVR. CT of the chest revealed right lower lobe lung consolidation and bilateral pleural effusions. [facility] is being consulted for atrial flutter.     Hospital Course:  7.12.25: On multiple meds for control of afib. HR now controlled.   She still feels poorly, dense consolidation RLL on CT.  Some hemoptysis and nosebleed, also diarrhea after each meal  7.13.25:  Currently AFib, CVR.  BP stable.  Still requiring O2 supplementation        PMH: persistent atrial fibrillation on eliquis, HLD, and anxiety.  PSH: appendectomy, carpal tunnel surgery, foot and hand surgery, hip replacement, tubal ligation  Family History: Mother-liver disease, Father-heart disease  Social History: Denied tobacco and drug use     Previous Cardiac Diagnostics:   Echocardiogram 7.10.25  Left Ventricle: The left ventricle is normal in size. Normal wall thickness. There is normal systolic function. Ejection fraction is approximately 55%.  Right Ventricle: The right ventricle is normal in size Systolic function is normal. TAPSE is 2.0 cm.  Mitral Valve: There is mild regurgitation.  Tricuspid Valve: There is mild regurgitation.      Review of Systems   Constitutional: Positive for decreased appetite and malaise/fatigue.   Cardiovascular: Negative.   "  Respiratory:  Positive for shortness of breath.    All other systems reviewed and are negative.    Objective:     Vital Signs (Most Recent):  Temp: 97.5 °F (36.4 °C) (07/13/25 0812)  Pulse: 76 (07/13/25 0812)  Resp: 20 (07/13/25 0812)  BP: 110/69 (07/13/25 0812)  SpO2: (!) 93 % (07/13/25 0812) Vital Signs (24h Range):  Temp:  [97.4 °F (36.3 °C)-99.4 °F (37.4 °C)] 97.5 °F (36.4 °C)  Pulse:  [66-86] 76  Resp:  [17-20] 20  SpO2:  [80 %-93 %] 93 %  BP: (100-123)/(61-72) 110/69   Weight: 63.5 kg (140 lb)  Body mass index is 23.3 kg/m².  SpO2: (!) 93 %       Intake/Output Summary (Last 24 hours) at 7/13/2025 0833  Last data filed at 7/12/2025 1443  Gross per 24 hour   Intake 600 ml   Output --   Net 600 ml       Lines/Drains/Airways       Peripheral Intravenous Line  Duration             Peripheral IV 07/09/25 2218 18 G Anterior;Right Forearm 3 days    Peripheral IV 07/10/25 0021 18 G Right Antecubital 3 days                    Significant Labs:   Chemistries:   Recent Labs   Lab 07/09/25  2218 07/09/25  2218 07/10/25  0541 07/11/25  0620 07/12/25  0512 07/13/25  0648   *  --  138 136 136 139   K 3.5  --  3.1* 3.8 3.5 3.4*     --  105 106 104 107   CO2 20*  --  21* 22* 22* 24   BUN 12.8  --  9.3* 12.2 13.4 11.6   CREATININE 0.73  --  0.72 0.69 0.69 0.64   CALCIUM 9.5  --  8.6 8.8 8.8 9.2   PROT 7.6  --  6.6 6.5  --  6.7   BILITOT 1.0  --  0.7 0.7  --  0.7   ALKPHOS 79  --  66 69  --  104   ALT 71*  --  58* 60*  --  98*   AST 70*  --  48* 53*  --  86*   MG  --    < > 1.80 2.00 2.00 2.10   PHOS  --   --   --   --   --  3.2   TROPONINI <0.010  --   --   --   --   --     < > = values in this interval not displayed.        CBC/Anemia Labs: Coags:    Recent Labs   Lab 07/10/25  0541 07/11/25  0620 07/13/25  0648   WBC 9.71 8.80 9.20   HGB 10.5* 9.8* 10.0*   HCT 31.7* 29.5* 30.0*    221 333   MCV 89.3 89.9 88.5   RDW 13.7 14.3 14.1    No results for input(s): "PT", "INR", "APTT" in the last 168 hours. "     Telemetry:  AF    Physical Exam  Vitals reviewed.   Constitutional:       Appearance: Normal appearance.   HENT:      Mouth/Throat:      Mouth: Mucous membranes are moist.   Cardiovascular:      Rate and Rhythm: Normal rate. Rhythm irregular.      Pulses: Normal pulses.      Heart sounds: Normal heart sounds.   Pulmonary:      Effort: Pulmonary effort is normal.      Comments: Bronchial BS both bases R>>L  Abdominal:      General: Bowel sounds are normal.      Palpations: Abdomen is soft.   Musculoskeletal:         General: Normal range of motion.   Skin:     General: Skin is warm and dry.      Capillary Refill: Capillary refill takes less than 2 seconds.   Neurological:      General: No focal deficit present.      Mental Status: She is alert and oriented to person, place, and time.         Current Schedule Inpatient Medications:   apixaban  5 mg Oral BID    cetirizine  10 mg Oral QHS    diltiaZEM  90 mg Oral Q6H    doxycycline  100 mg Oral Q12H    ezetimibe  10 mg Oral Daily    Lactobacillus rhamnosus GG  1 packet Oral Daily    montelukast  10 mg Oral QHS    pantoprazole  40 mg Oral QAM    piperacillin-tazobactam (Zosyn) IV (PEDS and ADULTS) (extended infusion is not appropriate)  4.5 g Intravenous Q8H    potassium chloride  40 mEq Oral Once    predniSONE  40 mg Oral Daily    sertraline  50 mg Oral QHS    sotaloL  80 mg Oral BID     Continuous Infusions:        Assessment:   Persistent AF  --Chads-Vasc 2  Pneumonia  UTI  HLD  Asthma  Anxiety    Plan:   Continue  Sotolol 80 mg po bid, and Eliquis 5 mg po bid; switch Cardizem to long-acting  Delay possible cardioversion until pneumonia resolved, probably as outpatient   Keep K>4 and Mag>2  Labs and EKG in am: CBC, CMP and Mag  Abdominal RUQ ultrasound ordered for persistently elevated LFT's and low albumin    OLGA OreillyP  Cardiology  [facility]

## 2025-07-13 NOTE — PT/OT/SLP EVAL
"Physical Therapy Evaluation and Discharge Note    Patient Name:  Nel Recinos   MRN:  [id number]    Recommendations:     Discharge therapy intensity: No Therapy Indicated   Discharge Equipment Recommendations: walker, rolling   Barriers to discharge: Ongoing medical needs    Assessment:     Nel Recinos is a 74 y.o. female admitted with a medical diagnosis of PNA, Acute hypoxic resp failure, a-fib w/ RVR.  At this time, patient is functioning at their prior level of function and does not require further acute PT services.     DME Justification:   Nel's mobility limitation cannot be sufficiently resolved by the use of a cane. Her functional mobility deficit can be sufficiently resolved with the use of a Rolling Walker. Patient's mobility limitation significantly impairs their ability to participate in one of more activities of daily living.  The use of a RW will significantly improve the patient's ability to participate in MRADLS and the patient will use it on regular basis in the home.    Recent Surgery: * No surgery found *      Plan:     During this hospitalization, patient does not require further acute PT services.  Please re-consult if situation changes.      Subjective     Chief Complaint: "balance is a little off"  Patient/Family Comments/goals: return to PLOF  Pain/Comfort:  Pain Rating 1: 0/10    Patients cultural, spiritual, Mandaeism conflicts given the current situation:      Living Environment:  Pt lives alone in [facility]  Prior to admission, patients level of function was independent.  Equipment used at home: cane, straight.  DME owned (not currently used): single point cane.  Upon discharge, patient will have assistance from unknown.    Objective:     Communicated with RN prior to session.  Patient found HOB elevated with oxygen, telemetry, pulse ox (continuous), peripheral IV upon PT entry to room.    General Precautions: Standard,      Orthopedic Precautions:N/A   Braces: N/A  Respiratory " Status: Nasal cannula, flow 4 L/min. Eval performed on RA, sats 89% post long distance ambulation on RA and quickly increasing to low 90's with seated rest  Blood Pressure:     Exams:  B LE ROM WFL  B LE Strength WFL    Functional Mobility:  Bed Mobility:     Supine to Sit: independence  Transfers:     Sit to Stand:  stand by assistance with no AD  Gait: ~12 ft w/ no AD, CGA. Pt with mild unsteadiness and reaching for furniture and wall for stability. Incorporated use of RW and ambulated an additional 150 ft in hallway at Mod I level. No unsteadiness or LOB w/ RW.    AM-PAC 6 CLICK MOBILITY  Total Score:23     Co-Treatment: No    Education Provided:  Role and goals of PT, transfer training, bed mobility, gait training, balance training, safety awareness, assistive device, strengthening exercises, and importance of participating in PT to return to PLOF.        Patient left up in chair with all lines intact, call button in reach, and visitor present.    GOALS:   Multidisciplinary Problems       Physical Therapy Goals       Not on file                    History:     Past Medical History:   Diagnosis Date    A-fib     Anxiety disorder, unspecified     Arthritis     Current use of long term anticoagulation 05/13/2025    GERD (gastroesophageal reflux disease)     High cholesterol     HLD (hyperlipidemia)     Osteoarthritis        Past Surgical History:   Procedure Laterality Date    APPENDECTOMY  1979    CARPAL TUNNEL RELEASE Left     FOOT SURGERY      HAND SURGERY      hysterectomy      HYSTERECTOMY  2013    JOINT REPLACEMENT  2017    Right Thumb    ROBOTIC ARTHROPLASTY,HIP,TOTAL,POSTERIOR APPROACH Left 11/27/2023    Procedure: ROBOTIC ARTHROPLASTY, HIP, TOTAL, POSTERIOR APPROACH;  Surgeon: Isidoro Waters MD;  Location: [facility];  Service: Orthopedics;  Laterality: Left;    TRANSFORAMINAL EPIDURAL INJECTION OF STEROID Bilateral 01/13/2025    Procedure: INJECTION, STEROID, EPIDURAL, TRANSFORAMINAL APPROACH      /////TFESI L4-L5 B/L;  Surgeon: Linsey Reed MD;  Location: [address] [facility] OR;  Service: Pain Management;  Laterality: Bilateral;  TFESI L4-L5 B/L    TUBAL LIGATION  1982       Time Tracking:     PT Received On: 07/13/25  PT Start Time: 1011     PT Stop Time: 1020  PT Total Time (min): 9 min     Billable Minutes: Evaluation 9 07/13/2025

## 2025-07-14 ENCOUNTER — TELEPHONE (OUTPATIENT)
Dept: FAMILY MEDICINE | Facility: CLINIC | Age: 75
End: 2025-07-14
Payer: MEDICARE

## 2025-07-14 VITALS
BODY MASS INDEX: 23.32 KG/M2 | WEIGHT: 140 LBS | TEMPERATURE: 97 F | HEART RATE: 70 BPM | SYSTOLIC BLOOD PRESSURE: 125 MMHG | HEIGHT: 65 IN | RESPIRATION RATE: 18 BRPM | DIASTOLIC BLOOD PRESSURE: 76 MMHG | OXYGEN SATURATION: 93 %

## 2025-07-14 PROCEDURE — 25000003 PHARM REV CODE 250: Performed by: INTERNAL MEDICINE

## 2025-07-14 PROCEDURE — 25000003 PHARM REV CODE 250: Performed by: SPECIALIST

## 2025-07-14 PROCEDURE — 97165 OT EVAL LOW COMPLEX 30 MIN: CPT

## 2025-07-14 PROCEDURE — 63600175 PHARM REV CODE 636 W HCPCS: Performed by: INTERNAL MEDICINE

## 2025-07-14 RX ORDER — DILTIAZEM HYDROCHLORIDE 120 MG/1
120 CAPSULE, COATED, EXTENDED RELEASE ORAL DAILY
Status: DISCONTINUED | OUTPATIENT
Start: 2025-07-15 | End: 2025-07-14 | Stop reason: HOSPADM

## 2025-07-14 RX ORDER — DILTIAZEM HYDROCHLORIDE 120 MG/1
120 CAPSULE, COATED, EXTENDED RELEASE ORAL DAILY
Status: COMPLETED | OUTPATIENT
Start: 2025-07-14 | End: 2025-07-14

## 2025-07-14 RX ORDER — AMOXICILLIN AND CLAVULANATE POTASSIUM 875; 125 MG/1; MG/1
1 TABLET, FILM COATED ORAL 2 TIMES DAILY
Qty: 14 TABLET | Refills: 0 | Status: SHIPPED | OUTPATIENT
Start: 2025-07-14 | End: 2025-07-21

## 2025-07-14 RX ORDER — CETIRIZINE HYDROCHLORIDE 10 MG/1
10 TABLET ORAL NIGHTLY
COMMUNITY
Start: 2025-07-14 | End: 2026-07-14

## 2025-07-14 RX ORDER — PREDNISONE 20 MG/1
40 TABLET ORAL DAILY
Qty: 6 TABLET | Refills: 0 | Status: SHIPPED | OUTPATIENT
Start: 2025-07-15 | End: 2025-07-18

## 2025-07-14 RX ORDER — BENZONATATE 100 MG/1
100 CAPSULE ORAL 3 TIMES DAILY PRN
Qty: 30 CAPSULE | Refills: 0 | Status: SHIPPED | OUTPATIENT
Start: 2025-07-14 | End: 2025-07-24

## 2025-07-14 RX ORDER — DILTIAZEM HYDROCHLORIDE 120 MG/1
120 CAPSULE, COATED, EXTENDED RELEASE ORAL DAILY
Qty: 30 CAPSULE | Refills: 0 | Status: SHIPPED | OUTPATIENT
Start: 2025-07-15 | End: 2025-08-14

## 2025-07-14 RX ADMIN — PREDNISONE 40 MG: 20 TABLET ORAL at 09:07

## 2025-07-14 RX ADMIN — APIXABAN 5 MG: 5 TABLET, FILM COATED ORAL at 09:07

## 2025-07-14 RX ADMIN — EZETIMIBE 10 MG: 10 TABLET ORAL at 09:07

## 2025-07-14 RX ADMIN — DOXYCYCLINE HYCLATE 100 MG: 100 TABLET, COATED ORAL at 09:07

## 2025-07-14 RX ADMIN — DILTIAZEM HYDROCHLORIDE 120 MG: 120 CAPSULE, COATED, EXTENDED RELEASE ORAL at 10:07

## 2025-07-14 RX ADMIN — SOTALOL HYDROCHLORIDE 80 MG: 80 TABLET ORAL at 09:07

## 2025-07-14 RX ADMIN — PIPERACILLIN SODIUM AND TAZOBACTAM SODIUM 4.5 G: 4; .5 INJECTION, POWDER, LYOPHILIZED, FOR SOLUTION INTRAVENOUS at 12:07

## 2025-07-14 RX ADMIN — PANTOPRAZOLE SODIUM 40 MG: 40 TABLET, DELAYED RELEASE ORAL at 06:07

## 2025-07-14 RX ADMIN — PIPERACILLIN SODIUM AND TAZOBACTAM SODIUM 4.5 G: 4; .5 INJECTION, POWDER, LYOPHILIZED, FOR SOLUTION INTRAVENOUS at 09:07

## 2025-07-14 NOTE — PLAN OF CARE
07/14/25 1101   Medicare Message   Important Message from Medicare regarding Discharge Appeal Rights Given to patient/caregiver;Explained to patient/caregiver

## 2025-07-14 NOTE — PROGRESS NOTES
[facility]    Cardiology  Progress Note    Patient Name: Nel Recinos  MRN: [id number]  Admission Date: 7/9/2025  Hospital Length of Stay: 4 days  Code Status: Full Code   Attending Physician: Yousif Keating MD   Primary Care Physician: Minda Siddiqui DO  Expected Discharge Date: 7/14/2025  Principal Problem:<principal problem not specified>    Subjective:     Brief HPI/Hospital Course:   Ms. Nel Recinos is a 74 year old, known to Dr. Staton and remotely to Dr. Staton, who presented to [facility] on 7.10.25 with c/o of SOB, dizziness, and fever. She has a history of atrial fibrillation on eliquis, HLD, and anxiety. Upon workup, temp was 103, AST 70, ALT 71, EKG revealed atrial flutter RVR. CT of the chest revealed right lower lobe lung consolidation and bilateral pleural effusions. [facility] is being consulted for atrial flutter.     Hospital Course:  7.12.25: On multiple meds for control of afib. HR now controlled.   She still feels poorly, dense consolidation RLL on CT.  Some hemoptysis and nosebleed, also diarrhea after each meal  7.13.25:  Currently AFib, CVR.  BP stable.  Still requiring O2 supplementation  7.14.25:  continues to improve, HR slower in AF, reduce diltiazem.  Possible discharge tomorrow      PMH: persistent atrial fibrillation on eliquis, HLD, and anxiety.  PSH: appendectomy, carpal tunnel surgery, foot and hand surgery, hip replacement, tubal ligation  Family History: Mother-liver disease, Father-heart disease  Social History: Denied tobacco and drug use     Previous Cardiac Diagnostics:   Echocardiogram 7.10.25  Left Ventricle: The left ventricle is normal in size. Normal wall thickness. There is normal systolic function. Ejection fraction is approximately 55%.  Right Ventricle: The right ventricle is normal in size Systolic function is normal. TAPSE is 2.0 cm.  Mitral Valve: There is mild regurgitation.  Tricuspid Valve: There is mild regurgitation.      Review of  Systems   Constitutional: Positive for decreased appetite and malaise/fatigue.   Cardiovascular: Negative.    Respiratory:  Positive for shortness of breath.    All other systems reviewed and are negative.    Objective:     Vital Signs (Most Recent):  Temp: 98.2 °F (36.8 °C) (07/14/25 0730)  Pulse: 75 (07/14/25 0730)  Resp: 20 (07/14/25 0730)  BP: 126/76 (07/14/25 0730)  SpO2: (!) 94 % (07/14/25 0730) Vital Signs (24h Range):  Temp:  [97.5 °F (36.4 °C)-98.4 °F (36.9 °C)] 98.2 °F (36.8 °C)  Pulse:  [61-76] 75  Resp:  [18-20] 20  SpO2:  [91 %-97 %] 94 %  BP: (101-130)/(59-81) 126/76   Weight: 63.5 kg (140 lb)  Body mass index is 23.3 kg/m².  SpO2: (!) 94 %       Intake/Output Summary (Last 24 hours) at 7/14/2025 0906  Last data filed at 7/13/2025 2152  Gross per 24 hour   Intake 1200 ml   Output 6 ml   Net 1194 ml       Lines/Drains/Airways       Peripheral Intravenous Line  Duration             Peripheral IV Single Lumen 07/13/25 1400 20 G 1 1/4 in Anterior;Left Forearm <1 day                    Significant Labs:   Chemistries:   Recent Labs   Lab 07/09/25  2218 07/09/25  2218 07/10/25  0541 07/11/25  0620 07/12/25  0512 07/13/25  0648   *  --  138 136 136 139   K 3.5  --  3.1* 3.8 3.5 3.4*     --  105 106 104 107   CO2 20*  --  21* 22* 22* 24   BUN 12.8  --  9.3* 12.2 13.4 11.6   CREATININE 0.73  --  0.72 0.69 0.69 0.64   CALCIUM 9.5  --  8.6 8.8 8.8 9.2   PROT 7.6  --  6.6 6.5  --  6.7   BILITOT 1.0  --  0.7 0.7  --  0.7   ALKPHOS 79  --  66 69  --  104   ALT 71*  --  58* 60*  --  98*   AST 70*  --  48* 53*  --  86*   MG  --    < > 1.80 2.00 2.00 2.10   PHOS  --   --   --   --   --  3.2   TROPONINI <0.010  --   --   --   --   --     < > = values in this interval not displayed.        CBC/Anemia Labs: Coags:    Recent Labs   Lab 07/10/25  0541 07/11/25  0620 07/13/25  0648   WBC 9.71 8.80 9.20   HGB 10.5* 9.8* 10.0*   HCT 31.7* 29.5* 30.0*    221 333   MCV 89.3 89.9 88.5   RDW 13.7 14.3 14.1    No  "results for input(s): "PT", "INR", "APTT" in the last 168 hours.     Telemetry:  AF    Physical Exam  Vitals reviewed.   Constitutional:       Appearance: Normal appearance.   HENT:      Mouth/Throat:      Mouth: Mucous membranes are moist.   Cardiovascular:      Rate and Rhythm: Normal rate. Rhythm irregular.      Pulses: Normal pulses.      Heart sounds: Normal heart sounds.   Pulmonary:      Effort: Pulmonary effort is normal.      Breath sounds: Rales present.      Comments: Bronchial BS both bases R>>L  Abdominal:      General: Bowel sounds are normal.      Palpations: Abdomen is soft.   Musculoskeletal:         General: Normal range of motion.   Skin:     General: Skin is warm and dry.      Capillary Refill: Capillary refill takes less than 2 seconds.   Neurological:      General: No focal deficit present.      Mental Status: She is alert and oriented to person, place, and time.         Current Schedule Inpatient Medications:   apixaban  5 mg Oral BID    cetirizine  10 mg Oral QHS    [START ON 7/15/2025] diltiaZEM  120 mg Oral Daily    doxycycline  100 mg Oral Q12H    ezetimibe  10 mg Oral Daily    Lactobacillus rhamnosus GG  1 packet Oral Daily    montelukast  10 mg Oral QHS    pantoprazole  40 mg Oral QAM    piperacillin-tazobactam (Zosyn) IV (PEDS and ADULTS) (extended infusion is not appropriate)  4.5 g Intravenous Q8H    predniSONE  40 mg Oral Daily    sertraline  50 mg Oral QHS    sotaloL  80 mg Oral BID     Continuous Infusions:        Assessment:   Persistent AF  --Chads-Vasc 2  Pneumonia  UTI  HLD  Asthma  Anxiety    Plan:   Continue  Sotolol 80 mg po bid, and Eliquis 5 mg po bid; switch Cardizem to long-acting, reduce dose  Delay possible cardioversion until pneumonia resolved, probably as outpatient   Keep K>4 and Mag>2  Labs and EKG in am: CBC, CMP and Mag  Abdominal RUQ ultrasound ordered for persistently elevated LFT's and low albumin  She will need to follow with Dr. Staton after discharge  Roland " SHANDA Franklin MD  Cardiology  [facility]

## 2025-07-14 NOTE — PROGRESS NOTES
[facility] Hospital Medicine Progress Note        Chief Complaint: Inpatient Follow-up for SOB/Pneumonia     HPI:   74 y.o. female with PMHx of A-fib on Eliquis, HLD, Anxiety disorder,  Arthritis, GERD presented to [facility] on 7/9/2025 with a primary complaint of  fever , generalized weakness, cough, sputum. Sinus and chest congestion, SOB and feeling unwell for two days. Symptoms are persistent since onset. On further questioning Pt reported associated nausea and diarrhea. Also reports occasional difficulty swallowing  but denies any recent chocking or aspiration event.      Vitals on arrival , Temp 99.7, /91, , RR 20, SpO2 98%, Labs showed WBC 10.9, Hgb 11, Plt 193, Na 134, K 3.5, , Cr 0.7. mildly elevated AST/ALT, Troponin neg, Lactic 1.3, TSH 1.9. EKG with A.fib with , CXR with evidence of Rt infrahilar lower lung lobe opacities. Subsequent CT chest showed Rt lower lung lobe large dense consolidation with mike dependent small pleura effusions. Blood cultures were drawn. Pt was given Diltiazem 5 mg bolus, Rocephin and Azithromycin in the ED. Pt was then admitted to  service for further management.      Antibiotic modified to Zosyn and Doxycyline. SLP consulted to assess risk of aspiration. Cardiology was consulted for A.fib with RVR. Pt was requiring supplemental oxygen 3 to 4L/ min to maintain SpO2>92%. Course complicated by persistent SOB, generalized weakness and A.fib with RVR. Supportive treatment with nebulized bronchodilators, antitussive , expectorant continued. Home Sotalol resumed. Additionally Pt was initiated on Diltiazem for rate control. Home eliquis continued for AC. Cardiology asked for Pulmonology consult. Pt was seen by Dr. Ramirez with Pulmonology on 7/12/25 and had no additional recommendation . Blood and Resp cultures were neg. Short course of Prednisone 40 mg added as of 7/12/25 due to persistent hypoxia with markedly elevated .        Interval Hx:   Pt reports feeling little bit better today   Able to cough up mucous , SOB is better   Afebrile. Currently rate is controlled. BP is stable, O2 4L  Labs today WBC 9.2, Hgb 10, AST/ALT mildly elevated     Case was discussed with patient's nurse and  on the floor.    Objective/physical exam:    General: In no acute distress, afebrile  Chest: Crackles and diminished breath sound Rt lower lobe   Heart: Irregular rhythm, +S1, S2, no appreciable murmur  Abdomen: Soft, nontender, BS +  MSK: Warm, no lower extremity edema, no clubbing or cyanosis  Neurologic: Alert and oriented x4        VITAL SIGNS: 24 HRS MIN & MAX LAST   Temp  Min: 97.4 °F (36.3 °C)  Max: 98.4 °F (36.9 °C) 97.5 °F (36.4 °C)   BP  Min: 101/61  Max: 123/70 101/61   Pulse  Min: 61  Max: 76  61   Resp  Min: 18  Max: 20 20   SpO2  Min: 86 %  Max: 97 % (!) 92 %     I have reviewed the following labs:  Recent Labs   Lab 07/10/25  0541 07/11/25  0620 07/13/25  0648   WBC 9.71 8.80 9.20   RBC 3.55* 3.28* 3.39*   HGB 10.5* 9.8* 10.0*   HCT 31.7* 29.5* 30.0*   MCV 89.3 89.9 88.5   MCH 29.6 29.9 29.5   MCHC 33.1 33.2 33.3   RDW 13.7 14.3 14.1    221 333   MPV 9.9 10.3 9.5     Recent Labs   Lab 07/10/25  0541 07/11/25  0620 07/12/25  0512 07/13/25  0648    136 136 139   K 3.1* 3.8 3.5 3.4*    106 104 107   CO2 21* 22* 22* 24   BUN 9.3* 12.2 13.4 11.6   CREATININE 0.72 0.69 0.69 0.64    102 109 130*   CALCIUM 8.6 8.8 8.8 9.2   MG 1.80 2.00 2.00 2.10   ALBUMIN 2.5* 2.2*  --  2.2*   PROT 6.6 6.5  --  6.7   ALKPHOS 66 69  --  104   ALT 58* 60*  --  98*   AST 48* 53*  --  86*   BILITOT 0.7 0.7  --  0.7     Microbiology Results (last 7 days)       Procedure Component Value Units Date/Time    Respiratory Culture [[id number]] Collected: 07/11/25 1205    Order Status: Completed Specimen: Sputum, Expectorated Updated: 07/13/25 0841     Respiratory Culture Rare BETA HEMOLYTIC STREP GROUP F     Comment: with rare normal  respiratory marlene        GRAM STAIN Quality 1+      Rare Gram positive cocci    Stool Culture [[id number]]  (Abnormal) Collected: 07/11/25 1737    Order Status: Completed Specimen: Stool Updated: 07/13/25 0651     Stool Culture Many Yeast    Narrative:      Negative for Salmonella, Shigella, Campylobacter, Vibrio, Aeromonas, Pleisiomonas, or Yersinia.    Blood culture #1 **CANNOT BE ORDERED STAT** [[id number]]  (Normal) Collected: 07/09/25 2222    Order Status: Completed Specimen: Blood Updated: 07/13/25 0002     Blood Culture No Growth At 72 Hours    Blood culture #2 **CANNOT BE ORDERED STAT** [[id number]]  (Normal) Collected: 07/09/25 2222    Order Status: Completed Specimen: Blood Updated: 07/13/25 0002     Blood Culture No Growth At 72 Hours    Clostridium Diff Toxin, A & B, EIA [[id number]] Collected: 07/11/25 1737    Order Status: Canceled Specimen: Stool Updated: 07/11/25 1819             See below for Radiology    Assessment/Plan:    Community Acquired pneumonia involving RLL due to unspecified organism, POA  Acute hypoxic resp failure requiring supp oxygen due to above, POA  Normocytic Anemia/ Anemia of chronic disease- mild-POA  Hypokalemia, mild -POA- corrected   Elevated LFT/Transaminitis , POA  Acute Gastroenteritis, POA  Paroxysmal atrial fibrillation with RVR, POA  Intermittent dysphagia for solid, per history -POA     Hx- HLD, Anxiety, OA, GERD     Plan-   Continue Zosyn and Doxycyline   Short course of Prednisone added  Change Nebulized bronchodilators to PRN  Continue supportive care as indicated     Cardiology is following for A.fib  Diltiazem added to optimize rate control   Sotalol continued      Correct and replete electrolytes with goal potassium >4, Mg>2  Monitor LFTs  GI PCR neg    Supportive treatment with antitussive, expectorant , anti emetics and imodium for diarrhea   Home meds are reviewed and resumed as appropriate.  PT/OT consulted - no therapy suggested         VTE prophylaxis: Home  Eliquis     Patient condition:  Fair     Anticipated discharge and Disposition:     Home with family           All diagnosis and differential diagnosis have been reviewed; assessment and plan has been documented; I have personally reviewed the labs and test results that are presently available; I have reviewed the patients medication list; I have reviewed the consulting providers response and recommendations. I have reviewed or attempted to review medical records based upon their availability    All of the patient's questions have been  addressed and answered. Patient's is agreeable to the above stated plan. I will continue to monitor closely and make adjustments to medical management as needed.    Portions of this note dictated using EMR integrated voice recognition software, and may be subject to voice recognition errors not corrected at proofreading. Please contact writer for clarification if needed.   _____________________________________________________________________    Malnutrition Status:  Nutrition consulted. Most recent weight and BMI monitored-     Measurements:  Wt Readings from Last 1 Encounters:   07/09/25 63.5 kg (140 lb)   Body mass index is 23.3 kg/m².    Patient has been screened and assessed by RD.    Malnutrition Type:  Context:    Level:      Malnutrition Characteristic Summary:       Interventions/Recommendations (treatment strategy):        Scheduled Med:   apixaban  5 mg Oral BID    cetirizine  10 mg Oral QHS    diltiaZEM  360 mg Oral Daily    doxycycline  100 mg Oral Q12H    ezetimibe  10 mg Oral Daily    Lactobacillus rhamnosus GG  1 packet Oral Daily    montelukast  10 mg Oral QHS    pantoprazole  40 mg Oral QAM    piperacillin-tazobactam (Zosyn) IV (PEDS and ADULTS) (extended infusion is not appropriate)  4.5 g Intravenous Q8H    predniSONE  40 mg Oral Daily    sertraline  50 mg Oral QHS    sotaloL  80 mg Oral BID      Continuous Infusions:     PRN Meds:    Current Facility-Administered  Medications:     acetaminophen, 1,000 mg, Oral, Q6H PRN    aluminum-magnesium hydroxide-simethicone, 30 mL, Oral, QID PRN    benzonatate, 100 mg, Oral, TID PRN    bisacodyL, 10 mg, Rectal, Daily PRN    dextromethorphan-guaiFENesin  mg/5 ml, 10 mL, Oral, Q4H PRN    dextrose 50%, 12.5 g, Intravenous, PRN    dextrose 50%, 25 g, Intravenous, PRN    glucagon (human recombinant), 1 mg, Intramuscular, PRN    glucose, 16 g, Oral, PRN    glucose, 24 g, Oral, PRN    levalbuterol, 1.25 mg, Nebulization, Q4H PRN    loperamide, 4 mg, Oral, QID PRN    melatonin, 6 mg, Oral, Nightly PRN    naloxone, 0.02 mg, Intravenous, PRN    ondansetron, 4 mg, Intravenous, Q4H PRN    prochlorperazine, 5 mg, Intravenous, Q6H PRN    senna-docusate, 1 tablet, Oral, BID PRN    sodium chloride 0.9%, 10 mL, Intravenous, PRN         Yousif Keating MD  Department of Hospital Medicine   [facility]   07/13/2025

## 2025-07-14 NOTE — PLAN OF CARE
07/14/25 1057   Medicare Message   Important Message from Medicare regarding Discharge Appeal Rights Given to patient/caregiver;Explained to patient/caregiver

## 2025-07-14 NOTE — DISCHARGE INSTRUCTIONS
Our goal at [facility] is to always give you outstanding care and exceptional service. You may receive a survey from [employer] by mail, text or e-mail in the next 24-48 hours asking about the care you received with us. The survey should only take 5-10 minutes to complete and is very important to us.     Your feedback provides us with a way to recognize our staff who work tirelessly to provide the best care! Also, your responses help us learn how to improve when your experience was below our aspiration of excellence. We are always looking for ways to improve your stay. We WILL use your feedback to continue making improvements to help us provide the highest quality care. We keep your personal information and feedback confidential. We appreciate your time completing this survey and can't wait to hear from you!!!    We look forward to your continued care with us! Thanks so much for choosing [facility] for your healthcare needs!

## 2025-07-14 NOTE — NURSING
07/14/25 1245   AVS Confirmation   Discharge instructions and AVS provided to and reviewed with patient and/or significant other. Yes       AVS printed and handed to patient by bedside nurse. VN reviewed discharge instructions with patient using teachback method.  Allowed time for questions, all questions answered.  Patient verbalized complete understanding of discharge instructions. Discharge instructions complete. Bedside nurse notified.

## 2025-07-14 NOTE — PT/OT/SLP EVAL
Occupational Therapy  Evaluation/Discharge Summary     Name: Nel Recinos  MRN: [id number]  Recent Surgery: * No surgery found *      Recommendations:     Discharge therapy intensity: No Therapy Indicated   Discharge Equipment Recommendations:  none  Barriers to discharge:  None    Assessment:     Nel Recinos is a 74 y.o. female with a medical diagnosis of The primary encounter diagnosis was Pneumonia. Diagnoses of SOB (shortness of breath), Atrial fibrillation, Chest pain, Acute respiratory failure with hypoxia, and Atrial fibrillation with RVR were also pertinent to this visit.  .  She presents with the following performance deficits affecting function: impaired endurance.     Pt found supine; on RA this date. Pt with no complaints. Feels much improved since admit and near baseline. Reports slightly impaired endurance 2/2 being hospitalized. Pt able to perform ADLs and mobility supervision/mod I without AD. Educated on home safety. No further acute OT needs. D/c OT     Rehab Prognosis: Good; patient would benefit from acute skilled OT services to address these deficits and reach maximum level of function.       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      Patient was found supine with pulse ox (continuous), telemetry upon OT entry to  room.    General Precautions: Standard, aspiration  Orthopedic Precautions: N/A  Braces: N/A    Vital Signs: HR: 80's- 100's throughout session   Sp02: 91-95% on RA throughout session       Functional Mobility/Transfers:  Bed mobility:    Bridging: modified independence  Supine to Sit: modified independence  Sit to Supine: modified independence  Transfers: Sit to Stand: modified independence with no AD  Bed to Chair: modified independence with no AD using Step Transfer  Toilet Transfer: modified independence with no AD using Step Transfer  Functional Mobility: Mod I in room no AD     Activities of Daily Living:  Lower Body Dressing: modified independence seated EOB    AMPAC 6 Click ADL:  AMPA Total Score: 23    Functional Cognition:  Intact    Visual Perceptual Skills:  Intact    Upper Extremity Function:  Right Upper Extremity:   WFL; reports occasional numbness/tingling form previous injuries/surgeries     Left Upper Extremity:  WFL; reports occasional numbness/tingling from previous injuries/surgeries     Balance:   Intact    Therapeutic Positioning  Risk for acquired pressure injuries is decreased due to ability to get to BSC/toilet with assist.        Co-Treatment: No    Patient Education:  Patient and significant other were provided with verbal education education regarding OT role/goals/POC, fall prevention, safety awareness, and Discharge/DME recommendations.  Understanding was verbalized.     Patient left supine with all lines intact, call button in reach, bed alarm on, nsg notified, and sig other present.    GOALS:   Multidisciplinary Problems       Occupational Therapy Goals       Not on file                    History:     Past Medical History:   Diagnosis Date    A-fib     Anxiety disorder, unspecified     Arthritis     Current use of long term anticoagulation 05/13/2025    GERD (gastroesophageal reflux disease)     High cholesterol     HLD (hyperlipidemia)     Osteoarthritis          Past Surgical  History:   Procedure Laterality Date    APPENDECTOMY  1979    CARPAL TUNNEL RELEASE Left     FOOT SURGERY      HAND SURGERY      hysterectomy      HYSTERECTOMY  2013    JOINT REPLACEMENT  2017    Right Thumb    ROBOTIC ARTHROPLASTY,HIP,TOTAL,POSTERIOR APPROACH Left 11/27/2023    Procedure: ROBOTIC ARTHROPLASTY, HIP, TOTAL, POSTERIOR APPROACH;  Surgeon: Isidoro Waters MD;  Location: [facility];  Service: Orthopedics;  Laterality: Left;    TRANSFORAMINAL EPIDURAL INJECTION OF STEROID Bilateral 01/13/2025    Procedure: INJECTION, STEROID, EPIDURAL, TRANSFORAMINAL APPROACH     /////TFESI L4-L5 B/L;  Surgeon: Linsey Reed MD;  Location: [address] OR;  Service: Pain Management;  Laterality: Bilateral;  TFESI L4-L5 B/L    TUBAL LIGATION  1982       Time Tracking:     OT Date of Treatment: 07/14/25  OT Start Time: 0905  OT Stop Time: 0917  OT Total Time (min): 12 min    Billable Minutes:Evaluation 12    7/14/2025

## 2025-07-14 NOTE — TELEPHONE ENCOUNTER
Copied from CRM #[id number]. Topic: Appointments - Hospital Follow Up  >> Jul 14, 2025 10:47 AM [place] wrote:  Who Called: Nel Recinos    Caller is requesting a sooner appointment. Caller declined first available appointment listed below. Caller will not accept being placed on the waitlist and is requesting a message be sent to doctor.    When is the first available appointment?   Options offered (Virtual Visit, Urgent Care):    Symptoms:        Preferred Method of Contact: Phone Call  Patient's Preferred Phone Number on File: [phone]   Best Call Back Number, if different:  Additional Information:  pt needs an hfu 7 days

## 2025-07-14 NOTE — PLAN OF CARE
07/14/25 1100   Final Note   Assessment Type Final Discharge Note   Anticipated Discharge Disposition Home   Post-Acute Status   Post-Acute Authorization HME     PT recommended RW. Patient declines. SO will transport home.

## 2025-07-15 ENCOUNTER — PATIENT OUTREACH (OUTPATIENT)
Dept: ADMINISTRATIVE | Facility: CLINIC | Age: 75
End: 2025-07-15
Payer: MEDICARE

## 2025-07-15 ENCOUNTER — OFFICE VISIT (OUTPATIENT)
Dept: FAMILY MEDICINE | Facility: CLINIC | Age: 75
End: 2025-07-15
Payer: MEDICARE

## 2025-07-15 VITALS
WEIGHT: 143.63 LBS | RESPIRATION RATE: 17 BRPM | TEMPERATURE: 98 F | BODY MASS INDEX: 23.93 KG/M2 | OXYGEN SATURATION: 98 % | HEIGHT: 65 IN | HEART RATE: 80 BPM | SYSTOLIC BLOOD PRESSURE: 118 MMHG | DIASTOLIC BLOOD PRESSURE: 78 MMHG

## 2025-07-15 DIAGNOSIS — J18.9 PNEUMONIA OF RIGHT LOWER LOBE DUE TO INFECTIOUS ORGANISM: Primary | ICD-10-CM

## 2025-07-15 DIAGNOSIS — R73.9 HYPERGLYCEMIA: ICD-10-CM

## 2025-07-15 DIAGNOSIS — I48.91 ATRIAL FIBRILLATION, UNSPECIFIED TYPE: ICD-10-CM

## 2025-07-15 DIAGNOSIS — R74.01 TRANSAMINITIS: ICD-10-CM

## 2025-07-15 DIAGNOSIS — D64.9 NORMOCYTIC ANEMIA: ICD-10-CM

## 2025-07-15 DIAGNOSIS — Z11.59 ENCOUNTER FOR HEPATITIS C SCREENING TEST FOR LOW RISK PATIENT: ICD-10-CM

## 2025-07-15 DIAGNOSIS — E87.6 HYPOKALEMIA: ICD-10-CM

## 2025-07-15 LAB
BACTERIA BLD CULT: NORMAL
BACTERIA BLD CULT: NORMAL

## 2025-07-15 PROCEDURE — 1111F DSCHRG MED/CURRENT MED MERGE: CPT | Mod: CPTII,,, | Performed by: STUDENT IN AN ORGANIZED HEALTH CARE EDUCATION/TRAINING PROGRAM

## 2025-07-15 PROCEDURE — 99496 TRANSJ CARE MGMT HIGH F2F 7D: CPT | Mod: ,,, | Performed by: STUDENT IN AN ORGANIZED HEALTH CARE EDUCATION/TRAINING PROGRAM

## 2025-07-15 PROCEDURE — 1159F MED LIST DOCD IN RCRD: CPT | Mod: CPTII,,, | Performed by: STUDENT IN AN ORGANIZED HEALTH CARE EDUCATION/TRAINING PROGRAM

## 2025-07-15 PROCEDURE — 1101F PT FALLS ASSESS-DOCD LE1/YR: CPT | Mod: CPTII,,, | Performed by: STUDENT IN AN ORGANIZED HEALTH CARE EDUCATION/TRAINING PROGRAM

## 2025-07-15 PROCEDURE — 3288F FALL RISK ASSESSMENT DOCD: CPT | Mod: CPTII,,, | Performed by: STUDENT IN AN ORGANIZED HEALTH CARE EDUCATION/TRAINING PROGRAM

## 2025-07-15 PROCEDURE — 1126F AMNT PAIN NOTED NONE PRSNT: CPT | Mod: CPTII,,, | Performed by: STUDENT IN AN ORGANIZED HEALTH CARE EDUCATION/TRAINING PROGRAM

## 2025-07-15 PROCEDURE — 3074F SYST BP LT 130 MM HG: CPT | Mod: CPTII,,, | Performed by: STUDENT IN AN ORGANIZED HEALTH CARE EDUCATION/TRAINING PROGRAM

## 2025-07-15 PROCEDURE — 3078F DIAST BP <80 MM HG: CPT | Mod: CPTII,,, | Performed by: STUDENT IN AN ORGANIZED HEALTH CARE EDUCATION/TRAINING PROGRAM

## 2025-07-15 NOTE — PROGRESS NOTES
"Subjective:      Patient ID: Nel Recinos is a 74 y.o.  female. She's accompanied by her significant other.    Chief Complaint: Hospital Follow-Up    Hospital Follow-Up: Patient admitted on 07/09/25 and discharged on 07/14/25 from [facility]. She was treated for pneumonia, a-fib with RVR, and gastroenteritis. Transaminitis present on CMP. Patient treated with Zosyn and Doxycyline. SLP consulted. Cardiology consulted due to a-fib with RVR. She was started on Diltiazem for rate control. Pulmonology consulted. She required supplemental oxygen. Blood cultures and respiratory cultures negative. Patient transitioned to Augmentin upon discharge. Patient has a follow-up with Cardiology tomorrow and Pulmonology on 07/23/25. She states compliance with medication upon discharge.    Review of Systems   Constitutional:  Positive for fatigue. Negative for activity change and fever.   Eyes:  Negative for visual disturbance.   Respiratory:  Positive for shortness of breath. Negative for apnea and cough.    Cardiovascular:  Positive for palpitations. Negative for chest pain and leg swelling.   Gastrointestinal:  Positive for diarrhea. Negative for abdominal pain and nausea.   Genitourinary:  Negative for dysuria and hematuria.   Musculoskeletal:  Negative for arthralgias, back pain and myalgias.   Skin:  Negative for rash and wound.   Neurological:  Positive for weakness. Negative for dizziness, numbness and headaches.   Psychiatric/Behavioral:  Negative for behavioral problems and dysphoric mood.      Objective:   /78 (BP Location: Right arm, Patient Position: Sitting)   Pulse 80   Temp 97.9 °F (36.6 °C) (Oral)   Resp 17   Ht 5' 5" (1.651 m)   Wt 65.1 kg (143 lb 9.6 oz)   SpO2 98%   BMI 23.90 kg/m²     Physical Exam  Vitals and nursing note reviewed.   Constitutional:       General: She is not in acute distress.     Appearance: Normal appearance. She is not ill-appearing or " toxic-appearing.   HENT:      Head: Normocephalic and atraumatic.   Eyes:      Conjunctiva/sclera: Conjunctivae normal.   Cardiovascular:      Rate and Rhythm: Normal rate and regular rhythm.      Heart sounds: Normal heart sounds. No murmur heard.  Pulmonary:      Effort: Pulmonary effort is normal. No respiratory distress.      Breath sounds: Normal breath sounds.   Abdominal:      General: There is no distension.      Palpations: Abdomen is soft.      Tenderness: There is no abdominal tenderness.   Musculoskeletal:         General: No deformity.      Right lower leg: No edema.      Left lower leg: No edema.   Skin:     General: Skin is warm and dry.      Findings: No lesion or rash.   Neurological:      General: No focal deficit present.      Mental Status: She is alert.      Motor: No weakness.      Coordination: Coordination normal.   Psychiatric:         Mood and Affect: Mood normal.         Behavior: Behavior normal.         Thought Content: Thought content normal.         Judgment: Judgment normal.       Assessment/Plan:   1. Pneumonia of right lower lobe due to infectious organism  Comments:  - Hospital discharge summary reviewed/summarized in HPI.  - Clinically improving.  - Complete Augmentin.  - Keep follow-up with Pulmonology.    2. Atrial fibrillation, unspecified type  Assessment & Plan:  - Stable.  - Patient has a follow-up with Cardiology tomorrow.  - Continue current medications.      3. Transaminitis  Comments:  - Patient counseled to avoid Tylenol, alcohol, and any herbal remedies.  Orders:  -     Hepatitis C Antibody; Future; Expected date: 07/15/2025    4. Normocytic anemia  -     CBC Auto Differential; Future; Expected date: 07/15/2025  -     Comprehensive Metabolic Panel; Future; Expected date: 07/15/2025  -     Iron and TIBC; Future; Expected date: 07/15/2025  -     Ferritin; Future; Expected date: 07/15/2025  -     Reticulocytes; Future; Expected date: 07/15/2025  -     Path Review,  Peripheral Smear; Future; Expected date: 07/15/2025  -     Vitamin B12; Future; Expected date: 07/15/2025  -     Folate; Future; Expected date: 07/15/2025    5. Hyperglycemia  -     Hemoglobin A1C; Future; Expected date: 07/15/2025    6. Hypokalemia  -     Magnesium; Future; Expected date: 07/15/2025    7. Encounter for hepatitis C screening test for low risk patient  -     Hepatitis C Antibody; Future; Expected date: 07/15/2025       Follow up in about 3 months (around 10/15/2025) for Medicare Wellness.     Transitional Care Note    Family and/or Caretaker present at visit?  Yes.  Diagnostic tests reviewed/disposition: No diagnosic tests pending after this hospitalization.  Disease/illness education: Patient educated.  Home health/community services discussion/referrals: Patient does not have home health established from hospital visit.  They do not need home health.  If needed, we will set up home health for the patient.   Establishment or re-establishment of referral orders for community resources: No other necessary community resources.   Discussion with other health care providers: No discussion with other health care providers necessary.

## 2025-07-15 NOTE — DISCHARGE SUMMARY
[facility] Hospital Medicine Discharge Summary    Admit Date: 7/9/2025  Discharge Date and Time: 7/14/202511:51 PM  Admitting Physician:  Team  Discharging Physician: Yousif Keating MD.  Primary Care Physician: Minda Siddiqui DO  Consults: Cardiology and Pulmonary/Intensive care    Discharge Diagnoses:  Community Acquired pneumonia involving RLL due to unspecified organism, POA  Acute hypoxic resp failure requiring supp oxygen due to above, POA- resolved   Normocytic Anemia/ Anemia of chronic disease- mild-POA  Hypokalemia, mild -POA- corrected   Elevated LFT/Transaminitis , POA  Acute Gastroenteritis, POA  Paroxysmal atrial fibrillation with RVR, POA  Intermittent dysphagia for solid, per history -POA     Hx- HLD, Anxiety, OA, GERD    Hospital Course:   74 y.o. female with PMHx of A-fib on Eliquis, HLD, Anxiety disorder,  Arthritis, GERD presented to [facility] on 7/9/2025 with a primary complaint of  fever , generalized weakness, cough, sputum. Sinus and chest congestion, SOB and feeling unwell for two days. Symptoms are persistent since onset. On further questioning Pt reported associated nausea and diarrhea. Also reports occasional difficulty swallowing  but denies any recent chocking or aspiration event.      Vitals on arrival , Temp 99.7, /91, , RR 20, SpO2 98%, Labs showed WBC 10.9, Hgb 11, Plt 193, Na 134, K 3.5, , Cr 0.7. mildly elevated AST/ALT, Troponin neg, Lactic 1.3, TSH 1.9. EKG with A.fib with , CXR with evidence of Rt infrahilar lower lung lobe opacities. Subsequent CT chest showed Rt lower lung lobe large dense consolidation with mike dependent small pleura effusions. Blood cultures were drawn. Pt was given Diltiazem 5 mg bolus, Rocephin and Azithromycin in the ED. Pt was then admitted to  service for further management.      Antibiotic modified to Zosyn and Doxycyline. SLP consulted to assess risk of aspiration. Cardiology was consulted for  A.fib with RVR. Pt was requiring supplemental oxygen 3 to 4L/ min to maintain SpO2>92%. Course complicated by persistent SOB, generalized weakness and A.fib with RVR. Supportive treatment with nebulized bronchodilators, antitussive , expectorant continued. Home Sotalol resumed. Additionally Pt was initiated on Diltiazem for rate control. Home eliquis continued for AC. Cardiology asked for Pulmonology consult. Pt was seen by Dr. Ramirez with Pulmonology on 7/12/25 and had no additional recommendation . Blood and Resp cultures were neg. Short course of Prednisone 40 mg added as of 7/12/25 due to persistent hypoxia with markedly elevated . O2 wean down to RA as of 7/14/25. Satisfactory clinical improvement by 7/14/25. Antibiotic transitioned to oral Augmentin. Pt deemed stable for discharge to home on oral Augmenti  on 7/14/25. Follow up with PCP for post Hospital discharge follow up.     Pt was seen and examined on the day of discharge  Vitals:  VITAL SIGNS: 24 HRS MIN & MAX LAST   Temp  Min: 97.3 °F (36.3 °C)  Max: 98.2 °F (36.8 °C) 97.3 °F (36.3 °C)   BP  Min: 123/76  Max: 130/81 125/76   Pulse  Min: 70  Max: 76  70   Resp  Min: 18  Max: 20 18   SpO2  Min: 91 %  Max: 94 % (!) 93 %       Physical Exam:  General: In no acute distress, afebrile  Chest: Crackles and diminished breath sound Rt lower lobe   Heart: Irregular rhythm, +S1, S2, no appreciable murmur  Abdomen: Soft, nontender, BS +  MSK: Warm, no lower extremity edema, no clubbing or cyanosis  Neurologic: Alert and oriented x4    Procedures Performed: No admission procedures for hospital encounter.     Significant Diagnostic Studies: See Full reports for all details    Recent Labs   Lab 07/10/25  0541 07/11/25  0620 07/13/25  0648   WBC 9.71 8.80 9.20   RBC 3.55* 3.28* 3.39*   HGB 10.5* 9.8* 10.0*   HCT 31.7* 29.5* 30.0*   MCV 89.3 89.9 88.5   MCH 29.6 29.9 29.5   MCHC 33.1 33.2 33.3   RDW 13.7 14.3 14.1    221 333   MPV 9.9 10.3 9.5       Recent  Labs   Lab 07/10/25  0541 07/11/25  0620 07/12/25  0512 07/13/25  0648    136 136 139   K 3.1* 3.8 3.5 3.4*    106 104 107   CO2 21* 22* 22* 24   BUN 9.3* 12.2 13.4 11.6   CREATININE 0.72 0.69 0.69 0.64    102 109 130*   CALCIUM 8.6 8.8 8.8 9.2   MG 1.80 2.00 2.00 2.10   ALBUMIN 2.5* 2.2*  --  2.2*   PROT 6.6 6.5  --  6.7   ALKPHOS 66 69  --  104   ALT 58* 60*  --  98*   AST 48* 53*  --  86*   BILITOT 0.7 0.7  --  0.7        Microbiology Results (last 7 days)       Procedure Component Value Units Date/Time    Blood culture #1 **CANNOT BE ORDERED STAT** [[id number]]  (Normal) Collected: 07/09/25 2222    Order Status: Completed Specimen: Blood Updated: 07/14/25 0000     Blood Culture No Growth At 96 Hours    Blood culture #2 **CANNOT BE ORDERED STAT** [[id number]]  (Normal) Collected: 07/09/25 2222    Order Status: Completed Specimen: Blood Updated: 07/14/25 0000     Blood Culture No Growth At 96 Hours    Respiratory Culture [[id number]] Collected: 07/11/25 1205    Order Status: Completed Specimen: Sputum, Expectorated Updated: 07/13/25 0841     Respiratory Culture Rare BETA HEMOLYTIC STREP GROUP F     Comment: with rare normal respiratory marlene        GRAM STAIN Quality 1+      Rare Gram positive cocci    Stool Culture [[id number]]  (Abnormal) Collected: 07/11/25 1737    Order Status: Completed Specimen: Stool Updated: 07/13/25 0651     Stool Culture Many Yeast    Narrative:      Negative for Salmonella, Shigella, Campylobacter, Vibrio, Aeromonas, Pleisiomonas, or Yersinia.    Clostridium Diff Toxin, A & B, EIA [[id number]] Collected: 07/11/25 1737    Order Status: Canceled Specimen: Stool Updated: 07/11/25 1819             X-Ray Chest PA And Lateral  Narrative: EXAMINATION:  XR CHEST PA AND LATERAL    CLINICAL HISTORY:  pneumonia;    TECHNIQUE:  PA and lateral views of the chest were performed.    COMPARISON:  07/09/2025    FINDINGS:  There is a left lower lobe pneumonia.  There is right lower  lobe atelectasis.  There are small effusions bilaterally.  The heart is normal appearance.  Bones and joints show no acute abnormality.  Impression: Interval development of left lower lobe pneumonia    Stable right lower lobe atelectasis    Bilateral small pleural effusions    Electronically signed by: Sukumar Massey  Date:    07/12/2025  Time:    11:26         Medication List        START taking these medications      amoxicillin-clavulanate 875-125mg 875-125 mg per tablet  Commonly known as: AUGMENTIN  Take 1 tablet by mouth 2 (two) times daily. for 7 days     benzonatate 100 MG capsule  Commonly known as: TESSALON  Take 1 capsule (100 mg total) by mouth 3 (three) times daily as needed for Cough.     cetirizine 10 MG tablet  Commonly known as: ZYRTEC  Take 1 tablet (10 mg total) by mouth every evening. As needed for sinus congestion     diltiaZEM 120 MG Cp24  Commonly known as: CARDIZEM CD  Take 1 capsule (120 mg total) by mouth once daily. For rate control  Start taking on: July 15, 2025     Lactobacillus rhamnosus GG 5 billion cell packet (PEDS)  Commonly known as: CULTERELLE  Take 1 packet (1 each total) by mouth once daily.     predniSONE 20 MG tablet  Commonly known as: DELTASONE  Take 2 tablets (40 mg total) by mouth once daily. for 3 days  Start taking on: July 15, 2025            CONTINUE taking these medications      ELIQUIS 5 mg Tab  Generic drug: apixaban     ezetimibe 10 mg tablet  Commonly known as: ZETIA  Take 1 tablet (10 mg total) by mouth once daily.     fluticasone propionate 50 mcg/actuation nasal spray  Commonly known as: FLONASE     melatonin 5 mg  Commonly known as: MELATIN     montelukast 10 mg tablet  Commonly known as: SINGULAIR  Take 1 tablet (10 mg total) by mouth every evening.     pantoprazole 40 MG tablet  Commonly known as: PROTONIX     sertraline 50 MG tablet  Commonly known as: ZOLOFT     sotaloL 80 MG tablet  Commonly known as: BETAPACE  Take 1 tablet (80 mg total) by mouth 2 (two)  times daily.               Where to Get Your Medications        These medications were sent to [id number] - [place] - [address]  [address] [place] [address]      Phone: [phone]   amoxicillin-clavulanate 875-125mg 875-125 mg per tablet  benzonatate 100 MG capsule  diltiaZEM 120 MG Cp24  predniSONE 20 MG tablet       You can get these medications from any pharmacy    You don't need a prescription for these medications  cetirizine 10 MG tablet  Lactobacillus rhamnosus GG 5 billion cell packet (PEDS)          Explained in detail to the patient about the discharge plan, medications, and follow-up visits. Pt understands and agrees with the treatment plan  Discharge Disposition: Home or Self Care   Discharged Condition: stable  Diet-    Medications Per DC med rec  Activities as tolerated   Follow-up Information       Minda Siddiqui, DO Follow up.    Specialty: Family Medicine  Why: **office will call pt to schedule**  Contact information:  [address]  [facility] [id number]  [phone]               Rishi Staton MD Follow up on 7/21/2025.    Specialty: Cardiology  Why: @3:10  Contact information:  [address] [place] [id number]  [phone]               Donny Macario MD Follow up on 7/23/2025.    Specialty: Pulmonary Disease  Why: @9:30  Contact information:  [address] [place] [address]  [address]  [facility] [address]  [phone]                           For further questions contact hospitalist office    Discharge time 33 minutes    For worsening symptoms, chest pain, shortness of breath, increased abdominal pain, high grade fever, stroke or stroke like symptoms, immediately go to the nearest Emergency Room or call 911 as soon as possible.      Yousif Tse M.D, on 7/14/2025. at 11:51 PM.

## 2025-07-15 NOTE — PROGRESS NOTES
C3 nurse spoke with Nel Recinos  for a TCC post hospital discharge follow up call. The patient has a scheduled [facility] appointment with Minda Siddiqui DO  on 7/15/25 @ 1pm.

## 2025-07-16 ENCOUNTER — LAB VISIT (OUTPATIENT)
Dept: LAB | Facility: HOSPITAL | Age: 75
End: 2025-07-16
Attending: STUDENT IN AN ORGANIZED HEALTH CARE EDUCATION/TRAINING PROGRAM
Payer: MEDICARE

## 2025-07-16 DIAGNOSIS — D64.9 NORMOCYTIC ANEMIA: ICD-10-CM

## 2025-07-16 DIAGNOSIS — R73.9 HYPERGLYCEMIA: ICD-10-CM

## 2025-07-16 DIAGNOSIS — J06.9 UPPER RESPIRATORY TRACT INFECTION, UNSPECIFIED TYPE: Primary | ICD-10-CM

## 2025-07-16 DIAGNOSIS — R74.01 TRANSAMINITIS: ICD-10-CM

## 2025-07-16 DIAGNOSIS — Z11.59 ENCOUNTER FOR HEPATITIS C SCREENING TEST FOR LOW RISK PATIENT: ICD-10-CM

## 2025-07-16 DIAGNOSIS — E87.6 HYPOKALEMIA: ICD-10-CM

## 2025-07-16 LAB
ABS NEUT CALC (OHS): 16.68 X10(3)/MCL (ref 2.1–9.2)
ALBUMIN SERPL-MCNC: 3 G/DL (ref 3.4–4.8)
ALBUMIN/GLOB SERPL: 0.7 RATIO (ref 1.1–2)
ALP SERPL-CCNC: 83 UNIT/L (ref 40–150)
ALT SERPL-CCNC: 90 UNIT/L (ref 0–55)
ANION GAP SERPL CALC-SCNC: 10 MEQ/L
AST SERPL-CCNC: 28 UNIT/L (ref 11–45)
BILIRUB SERPL-MCNC: 0.8 MG/DL
BUN SERPL-MCNC: 20.6 MG/DL (ref 9.8–20.1)
CALCIUM SERPL-MCNC: 9.6 MG/DL (ref 8.4–10.2)
CHLORIDE SERPL-SCNC: 105 MMOL/L (ref 98–107)
CO2 SERPL-SCNC: 26 MMOL/L (ref 23–31)
CREAT SERPL-MCNC: 0.79 MG/DL (ref 0.55–1.02)
CREAT/UREA NIT SERPL: 26
ERYTHROCYTE [DISTWIDTH] IN BLOOD BY AUTOMATED COUNT: 14.2 % (ref 11.5–17)
EST. AVERAGE GLUCOSE BLD GHB EST-MCNC: 122.6 MG/DL
FERRITIN SERPL-MCNC: 1181.58 NG/ML (ref 4.63–204)
FOLATE SERPL-MCNC: 7.8 NG/ML (ref 7–31.4)
GFR SERPLBLD CREATININE-BSD FMLA CKD-EPI: >60 ML/MIN/1.73/M2
GLOBULIN SER-MCNC: 4.4 GM/DL (ref 2.4–3.5)
GLUCOSE SERPL-MCNC: 90 MG/DL (ref 82–115)
HBA1C MFR BLD: 5.9 %
HCT VFR BLD AUTO: 35.7 % (ref 37–47)
HCV AB SERPL QL IA: NONREACTIVE
HGB BLD-MCNC: 12 G/DL (ref 12–16)
IRON SATN MFR SERPL: 29 % (ref 20–50)
IRON SERPL-MCNC: 69 UG/DL (ref 50–170)
LYMPHOCYTES NFR BLD MANUAL: 1.19 X10(3)/MCL (ref 0.6–4.6)
LYMPHOCYTES NFR BLD MANUAL: 6 % (ref 13–40)
MAGNESIUM SERPL-MCNC: 2 MG/DL (ref 1.6–2.6)
MCH RBC QN AUTO: 29.9 PG (ref 27–31)
MCHC RBC AUTO-ENTMCNC: 33.6 G/DL (ref 33–36)
MCV RBC AUTO: 89 FL (ref 80–94)
METAMYELOCYTES NFR BLD MANUAL: 2 %
MONOCYTES NFR BLD MANUAL: 0.79 X10(3)/MCL (ref 0.1–1.3)
MONOCYTES NFR BLD MANUAL: 4 % (ref 2–11)
MYELOCYTES NFR BLD MANUAL: 4 %
NEUTROPHILS NFR BLD MANUAL: 84 % (ref 47–80)
NRBC BLD AUTO-RTO: 0.2 %
PLATELET # BLD AUTO: 569 X10(3)/MCL (ref 130–400)
PLATELET # BLD EST: ADEQUATE 10*3/UL
PMV BLD AUTO: 8.8 FL (ref 7.4–10.4)
POTASSIUM SERPL-SCNC: 3.5 MMOL/L (ref 3.5–5.1)
PROT SERPL-MCNC: 7.4 GM/DL (ref 5.8–7.6)
RBC # BLD AUTO: 4.01 X10(6)/MCL (ref 4.2–5.4)
RBC MORPH BLD: NORMAL
RET# (OHS): 0.1 X10E6/UL (ref 0.02–0.08)
RETICULOCYTE COUNT AUTOMATED (OLG): 2.53 % (ref 1.1–2.1)
SODIUM SERPL-SCNC: 141 MMOL/L (ref 136–145)
TIBC SERPL-MCNC: 167 UG/DL (ref 70–310)
TIBC SERPL-MCNC: 236 UG/DL (ref 250–450)
TRANSFERRIN SERPL-MCNC: 203 MG/DL (ref 173–360)
VIT B12 SERPL-MCNC: 659 PG/ML (ref 213–816)
WBC # BLD AUTO: 19.86 X10(3)/MCL (ref 4.5–11.5)

## 2025-07-16 PROCEDURE — 82728 ASSAY OF FERRITIN: CPT

## 2025-07-16 PROCEDURE — 83540 ASSAY OF IRON: CPT

## 2025-07-16 PROCEDURE — 85025 COMPLETE CBC W/AUTO DIFF WBC: CPT

## 2025-07-16 PROCEDURE — 83036 HEMOGLOBIN GLYCOSYLATED A1C: CPT

## 2025-07-16 PROCEDURE — 83735 ASSAY OF MAGNESIUM: CPT

## 2025-07-16 PROCEDURE — 86803 HEPATITIS C AB TEST: CPT

## 2025-07-16 PROCEDURE — 85045 AUTOMATED RETICULOCYTE COUNT: CPT

## 2025-07-16 PROCEDURE — 82607 VITAMIN B-12: CPT

## 2025-07-16 PROCEDURE — 80053 COMPREHEN METABOLIC PANEL: CPT

## 2025-07-16 PROCEDURE — 82746 ASSAY OF FOLIC ACID SERUM: CPT

## 2025-07-16 PROCEDURE — 36415 COLL VENOUS BLD VENIPUNCTURE: CPT

## 2025-07-21 LAB — VIEW PATHOLOGY REPORT (RELIAPATH): NORMAL

## 2025-07-23 RX ORDER — SERTRALINE HYDROCHLORIDE 50 MG/1
50 TABLET, FILM COATED ORAL DAILY
Qty: 90 TABLET | Refills: 3 | Status: SHIPPED | OUTPATIENT
Start: 2025-07-23

## 2025-07-23 NOTE — TELEPHONE ENCOUNTER
Copied from CRM #[id number]. Topic: Medications - Medication Refill  >> Jul 23, 2025  2:47 PM Medina wrote:  .Type:  RX Refill Request    Who Called: pt  Refill or New Rx:refill   RX Name and Strength:sertraline (ZOLOFT) 50 MG tablet  How is the patient currently taking it? (ex. 1XDay):1/day  Is this a 30 day or 90 day RX:90  Preferred Pharmacy with phone number:[facility] in [place]  Local or Mail Order:Local  Ordering Provider:Chen  Would the patient rather a call back or a response via [facility]?   Best Call Back Number:[phone]  Additional Information: sertraline (ZOLOFT) 50 MG tablet

## 2025-07-30 ENCOUNTER — TELEPHONE (OUTPATIENT)
Dept: FAMILY MEDICINE | Facility: CLINIC | Age: 75
End: 2025-07-30
Payer: MEDICARE

## 2025-08-08 ENCOUNTER — HOSPITAL ENCOUNTER (OUTPATIENT)
Dept: RADIOLOGY | Facility: HOSPITAL | Age: 75
Discharge: HOME | End: 2025-08-08
Attending: NURSE PRACTITIONER
Payer: MEDICARE

## 2025-08-08 DIAGNOSIS — Z78.0 POST-MENOPAUSAL: ICD-10-CM

## 2025-08-08 DIAGNOSIS — Z12.31 ENCOUNTER FOR SCREENING MAMMOGRAM FOR MALIGNANT NEOPLASM OF BREAST: ICD-10-CM

## 2025-08-08 PROCEDURE — 77067 SCR MAMMO BI INCL CAD: CPT | Mod: 26,,, | Performed by: STUDENT IN AN ORGANIZED HEALTH CARE EDUCATION/TRAINING PROGRAM

## 2025-08-08 PROCEDURE — 77063 BREAST TOMOSYNTHESIS BI: CPT | Mod: 26,,, | Performed by: STUDENT IN AN ORGANIZED HEALTH CARE EDUCATION/TRAINING PROGRAM

## 2025-08-08 PROCEDURE — 77067 SCR MAMMO BI INCL CAD: CPT | Mod: TC

## 2025-08-08 PROCEDURE — 77080 DXA BONE DENSITY AXIAL: CPT | Mod: TC

## 2025-08-11 ENCOUNTER — RESULTS FOLLOW-UP (OUTPATIENT)
Dept: FAMILY MEDICINE | Facility: CLINIC | Age: 75
End: 2025-08-11
Payer: MEDICARE

## 2025-08-11 DIAGNOSIS — Z91.89 FRACTURE RISK ASSESSMENT SCORE (FRAX) INDICATING GREATER THAN 20% RISK FOR MAJOR OSTEOPOROSIS-RELATED FRACTURE: Primary | ICD-10-CM

## 2025-08-11 DIAGNOSIS — Z91.89 FRACTURE RISK ASSESSMENT SCORE (FRAX) INDICATING GREATER THAN 3% RISK FOR HIP FRACTURE: ICD-10-CM

## 2025-08-11 RX ORDER — ALENDRONATE SODIUM 70 MG/1
70 TABLET ORAL
Qty: 12 TABLET | Refills: 3 | Status: SHIPPED | OUTPATIENT
Start: 2025-08-11 | End: 2026-08-11

## 2025-08-14 ENCOUNTER — HOSPITAL ENCOUNTER (OUTPATIENT)
Dept: RADIOLOGY | Facility: HOSPITAL | Age: 75
Discharge: HOME | End: 2025-08-14
Attending: NURSE PRACTITIONER
Payer: MEDICARE

## 2025-08-14 DIAGNOSIS — J18.9 PNEUMONIA OF BOTH LUNGS DUE TO INFECTIOUS ORGANISM, UNSPECIFIED PART OF LUNG: ICD-10-CM

## 2025-08-14 PROCEDURE — 71250 CT THORAX DX C-: CPT | Mod: TC

## 2025-08-18 ENCOUNTER — DOCUMENTATION ONLY (OUTPATIENT)
Dept: PULMONOLOGY | Facility: HOSPITAL | Age: 75
End: 2025-08-18
Payer: MEDICARE

## 2025-08-18 ENCOUNTER — TELEPHONE (OUTPATIENT)
Dept: FAMILY MEDICINE | Facility: CLINIC | Age: 75
End: 2025-08-18

## 2025-08-21 ENCOUNTER — OFFICE VISIT (OUTPATIENT)
Dept: FAMILY MEDICINE | Facility: CLINIC | Age: 75
End: 2025-08-21
Payer: MEDICARE

## 2025-08-21 ENCOUNTER — LAB VISIT (OUTPATIENT)
Dept: LAB | Facility: HOSPITAL | Age: 75
End: 2025-08-21
Attending: NURSE PRACTITIONER
Payer: MEDICARE

## 2025-08-21 VITALS
DIASTOLIC BLOOD PRESSURE: 62 MMHG | OXYGEN SATURATION: 97 % | WEIGHT: 140.13 LBS | TEMPERATURE: 98 F | HEIGHT: 65 IN | HEART RATE: 71 BPM | BODY MASS INDEX: 23.35 KG/M2 | RESPIRATION RATE: 18 BRPM | SYSTOLIC BLOOD PRESSURE: 104 MMHG

## 2025-08-21 DIAGNOSIS — D72.829 LEUKOCYTOSIS, UNSPECIFIED TYPE: ICD-10-CM

## 2025-08-21 DIAGNOSIS — R74.01 TRANSAMINITIS: ICD-10-CM

## 2025-08-21 DIAGNOSIS — R73.02 IGT (IMPAIRED GLUCOSE TOLERANCE): ICD-10-CM

## 2025-08-21 DIAGNOSIS — Z00.00 WELLNESS EXAMINATION: ICD-10-CM

## 2025-08-21 DIAGNOSIS — D72.829 LEUKOCYTOSIS, UNSPECIFIED TYPE: Primary | ICD-10-CM

## 2025-08-21 LAB
ALBUMIN SERPL-MCNC: 3.7 G/DL (ref 3.4–4.8)
ALBUMIN/GLOB SERPL: 1.1 RATIO (ref 1.1–2)
ALP SERPL-CCNC: 51 UNIT/L (ref 40–150)
ALT SERPL-CCNC: 14 UNIT/L (ref 0–55)
ANION GAP SERPL CALC-SCNC: 10 MEQ/L
AST SERPL-CCNC: 15 UNIT/L (ref 11–45)
BASOPHILS # BLD AUTO: 0.05 X10(3)/MCL
BASOPHILS NFR BLD AUTO: 0.8 %
BILIRUB SERPL-MCNC: 0.7 MG/DL
BUN SERPL-MCNC: 17.4 MG/DL (ref 9.8–20.1)
CALCIUM SERPL-MCNC: 9.2 MG/DL (ref 8.4–10.2)
CHLORIDE SERPL-SCNC: 109 MMOL/L (ref 98–107)
CO2 SERPL-SCNC: 23 MMOL/L (ref 23–31)
CREAT SERPL-MCNC: 0.83 MG/DL (ref 0.55–1.02)
CREAT/UREA NIT SERPL: 21
EOSINOPHIL # BLD AUTO: 0.14 X10(3)/MCL (ref 0–0.9)
EOSINOPHIL NFR BLD AUTO: 2.1 %
ERYTHROCYTE [DISTWIDTH] IN BLOOD BY AUTOMATED COUNT: 13.6 % (ref 11.5–17)
GFR SERPLBLD CREATININE-BSD FMLA CKD-EPI: >60 ML/MIN/1.73/M2
GLOBULIN SER-MCNC: 3.4 GM/DL (ref 2.4–3.5)
GLUCOSE SERPL-MCNC: 110 MG/DL (ref 82–115)
HCT VFR BLD AUTO: 36 % (ref 37–47)
HGB BLD-MCNC: 11.9 G/DL (ref 12–16)
IMM GRANULOCYTES # BLD AUTO: 0.02 X10(3)/MCL (ref 0–0.04)
IMM GRANULOCYTES NFR BLD AUTO: 0.3 %
LYMPHOCYTES # BLD AUTO: 2.03 X10(3)/MCL (ref 0.6–4.6)
LYMPHOCYTES NFR BLD AUTO: 30.8 %
MCH RBC QN AUTO: 29.8 PG (ref 27–31)
MCHC RBC AUTO-ENTMCNC: 33.1 G/DL (ref 33–36)
MCV RBC AUTO: 90 FL (ref 80–94)
MONOCYTES # BLD AUTO: 0.46 X10(3)/MCL (ref 0.1–1.3)
MONOCYTES NFR BLD AUTO: 7 %
NEUTROPHILS # BLD AUTO: 3.89 X10(3)/MCL (ref 2.1–9.2)
NEUTROPHILS NFR BLD AUTO: 59 %
NRBC BLD AUTO-RTO: 0 %
PLATELET # BLD AUTO: 264 X10(3)/MCL (ref 130–400)
PMV BLD AUTO: 9.6 FL (ref 7.4–10.4)
POTASSIUM SERPL-SCNC: 4 MMOL/L (ref 3.5–5.1)
PROT SERPL-MCNC: 7.1 GM/DL (ref 5.8–7.6)
RBC # BLD AUTO: 4 X10(6)/MCL (ref 4.2–5.4)
SODIUM SERPL-SCNC: 142 MMOL/L (ref 136–145)
WBC # BLD AUTO: 6.59 X10(3)/MCL (ref 4.5–11.5)

## 2025-08-21 PROCEDURE — 36415 COLL VENOUS BLD VENIPUNCTURE: CPT

## 2025-08-21 PROCEDURE — 85025 COMPLETE CBC W/AUTO DIFF WBC: CPT

## 2025-08-21 PROCEDURE — 80053 COMPREHEN METABOLIC PANEL: CPT

## (undated) DEVICE — SUT BLU BR 2 TAPERD NDL 1/2

## (undated) DEVICE — STAPLER SKIN PROXIMATE WIDE

## (undated) DEVICE — TAPE ADH MEDIPORE 4 X 10YDS

## (undated) DEVICE — SOL NORMAL USPCA 0.9%

## (undated) DEVICE — BLADE SAG DUAL CUT 18X90X1.35

## (undated) DEVICE — GLOVE SIGNATURE ESSNTL LTX 8.5

## (undated) DEVICE — KIT TRACKING VIZADISC HIP

## (undated) DEVICE — DEVICE STRATAFIX SYMMETRIC +

## (undated) DEVICE — GLOVE SENSICARE PI ORTHO LT 8

## (undated) DEVICE — COVER HD BACK TABLE 6FT

## (undated) DEVICE — DRAPE MEDIUM SHEET 40X70IN

## (undated) DEVICE — ELECTRODE PATIENT RETURN DISP

## (undated) DEVICE — SYR 3ML LL 18GA 1.5IN

## (undated) DEVICE — SET SMARTSITE EXT SMALLBORE NF

## (undated) DEVICE — NDL ANES SPINAL 18X3.5ST 18G

## (undated) DEVICE — RETRIEVER SUTURE HEWSON DISP

## (undated) DEVICE — NDL SYR 10ML 18X1.5 LL BLUNT

## (undated) DEVICE — GLOVE SENSICARE PI GRN 8.5

## (undated) DEVICE — DRESSING XEROFORM FOIL PK 1X8

## (undated) DEVICE — KIT DRAPE RIO ONE PIECE W/POCK

## (undated) DEVICE — Device

## (undated) DEVICE — CONTRAST ISOVUE M 200 20ML VIL

## (undated) DEVICE — PAD ABD 8X10 STERILE

## (undated) DEVICE — GLOVE SENSICARE PI MICRO 6.5

## (undated) DEVICE — NDL FLTR 5MCRN BLNT TIP 18GX1

## (undated) DEVICE — GLOVE SENSICARE PI GRN 7

## (undated) DEVICE — KIT TOTAL HIP HLGC

## (undated) DEVICE — KIT CHECKPOINT TIBIAL

## (undated) DEVICE — GLOVE SIGNATURE MICRO LTX 6.5

## (undated) DEVICE — SOL POVIDONE IODINE PCH 3/4OZ

## (undated) DEVICE — NDL HYPO REG 25G X 1 1/2

## (undated) DEVICE — APPLICATOR CHLORAPREP ORN 26ML

## (undated) DEVICE — DRAPE STERI U-SHAPED 47X51IN

## (undated) DEVICE — GAUZE SPONGE 4X4 12PLY

## (undated) DEVICE — DRAPE UTILITY W/ TAPE 20X30IN

## (undated) DEVICE — GOWN POLY REINF X-LONG 2XL

## (undated) DEVICE — DRAPE FULL SHEET 70X100IN

## (undated) DEVICE — SPONGE GAUZE 16PLY 4X4

## (undated) DEVICE — SOL NACL IRR 1000ML BTL

## (undated) DEVICE — DRAPE SURG W/TWL 17 5/8X23

## (undated) DEVICE — SUT MONO 2-0 CT-1 VIL

## (undated) DEVICE — DRAPE INCISE IOBAN 2 23X23IN

## (undated) DEVICE — PIN BONE 4 X 140MM STERILE
Type: IMPLANTABLE DEVICE | Site: HIP | Status: NON-FUNCTIONAL
Removed: 2023-11-27

## (undated) DEVICE — ADAPTER DUAL NSL LUER M-M 7FT

## (undated) DEVICE — KIT SURGICAL TURNOVER

## (undated) DEVICE — DRESSING XEROFORM 5X9IN

## (undated) DEVICE — CHLORAPREP 10.5 ML APPLICATOR